# Patient Record
Sex: FEMALE | Race: WHITE | URBAN - METROPOLITAN AREA
[De-identification: names, ages, dates, MRNs, and addresses within clinical notes are randomized per-mention and may not be internally consistent; named-entity substitution may affect disease eponyms.]

---

## 2018-03-01 ENCOUNTER — AMBULATORY CONVERSION ENCOUNTER (OUTPATIENT)
Dept: FAMILY MEDICINE | Facility: CLINIC | Age: 83
End: 2018-03-01

## 2018-03-14 ENCOUNTER — TELEPHONE (OUTPATIENT)
Dept: FAMILY MEDICINE | Facility: CLINIC | Age: 83
End: 2018-03-14

## 2018-03-15 ENCOUNTER — CLINICAL SUPPORT (OUTPATIENT)
Dept: FAMILY MEDICINE | Facility: CLINIC | Age: 83
End: 2018-03-15
Payer: MEDICARE

## 2018-03-15 VITALS — TEMPERATURE: 97.9 F

## 2018-03-15 DIAGNOSIS — M81.0 AGE-RELATED OSTEOPOROSIS WITHOUT CURRENT PATHOLOGICAL FRACTURE: Primary | ICD-10-CM

## 2018-03-30 RX ORDER — ZOLPIDEM TARTRATE 12.5 MG/1
12.5 TABLET, FILM COATED, EXTENDED RELEASE ORAL NIGHTLY
COMMUNITY
Start: 2018-02-01 | End: 2018-03-30 | Stop reason: SDUPTHER

## 2018-03-30 RX ORDER — ZOLPIDEM TARTRATE 12.5 MG/1
12.5 TABLET, FILM COATED, EXTENDED RELEASE ORAL NIGHTLY
Qty: 30 TABLET | Refills: 0 | Status: SHIPPED | OUTPATIENT
Start: 2018-03-30 | End: 2018-12-03 | Stop reason: DRUGHIGH

## 2018-04-10 RX ORDER — LANCETS 28 GAUGE
EACH MISCELLANEOUS
COMMUNITY
Start: 2016-06-06 | End: 2020-03-04

## 2018-04-10 RX ORDER — CLOPIDOGREL BISULFATE 75 MG/1
75 TABLET ORAL DAILY
COMMUNITY
Start: 2018-01-02 | End: 2018-05-11 | Stop reason: SDUPTHER

## 2018-04-10 RX ORDER — PIOGLITAZONEHYDROCHLORIDE 15 MG/1
15 TABLET ORAL DAILY
COMMUNITY
Start: 2018-01-02 | End: 2018-06-01 | Stop reason: SDUPTHER

## 2018-04-10 RX ORDER — ASPIRIN 325 MG
325 TABLET ORAL
COMMUNITY
Start: 2015-04-06 | End: 2019-03-25

## 2018-04-10 RX ORDER — SIMVASTATIN 40 MG/1
40 TABLET, FILM COATED ORAL DAILY
COMMUNITY
Start: 2018-01-02 | End: 2018-06-01 | Stop reason: SDUPTHER

## 2018-04-10 RX ORDER — GLYBURIDE-METFORMIN HYDROCHLORIDE 5; 500 MG/1; MG/1
1 TABLET ORAL DAILY
COMMUNITY
Start: 2018-01-02 | End: 2018-06-01 | Stop reason: SDUPTHER

## 2018-04-10 RX ORDER — PANTOPRAZOLE SODIUM 40 MG/1
40 TABLET, DELAYED RELEASE ORAL DAILY
COMMUNITY
Start: 2018-01-02 | End: 2018-06-01 | Stop reason: SDUPTHER

## 2018-04-10 RX ORDER — AMLODIPINE BESYLATE 10 MG/1
10 TABLET ORAL DAILY
COMMUNITY
Start: 2018-01-02 | End: 2018-06-01 | Stop reason: SDUPTHER

## 2018-04-12 ENCOUNTER — OFFICE VISIT (OUTPATIENT)
Dept: FAMILY MEDICINE | Facility: CLINIC | Age: 83
End: 2018-04-12
Attending: FAMILY MEDICINE
Payer: MEDICARE

## 2018-04-12 VITALS
WEIGHT: 183 LBS | HEIGHT: 62 IN | BODY MASS INDEX: 33.68 KG/M2 | TEMPERATURE: 98.1 F | SYSTOLIC BLOOD PRESSURE: 122 MMHG | OXYGEN SATURATION: 96 % | DIASTOLIC BLOOD PRESSURE: 60 MMHG | RESPIRATION RATE: 16 BRPM | HEART RATE: 80 BPM

## 2018-04-12 DIAGNOSIS — K21.9 GASTROESOPHAGEAL REFLUX DISEASE WITHOUT ESOPHAGITIS: ICD-10-CM

## 2018-04-12 DIAGNOSIS — E11.9 TYPE 2 DIABETES MELLITUS WITHOUT COMPLICATION, WITHOUT LONG-TERM CURRENT USE OF INSULIN (CMS/HCC): Primary | ICD-10-CM

## 2018-04-12 DIAGNOSIS — G47.9 SLEEP DISORDER: ICD-10-CM

## 2018-04-12 DIAGNOSIS — I10 ESSENTIAL HYPERTENSION: ICD-10-CM

## 2018-04-12 DIAGNOSIS — I73.9 PVD (PERIPHERAL VASCULAR DISEASE) (CMS/HCC): ICD-10-CM

## 2018-04-12 DIAGNOSIS — E78.2 MIXED HYPERLIPIDEMIA: ICD-10-CM

## 2018-04-12 DIAGNOSIS — G47.9 DIFFICULTY SLEEPING: ICD-10-CM

## 2018-04-12 PROCEDURE — 99214 OFFICE O/P EST MOD 30 MIN: CPT | Performed by: FAMILY MEDICINE

## 2018-04-12 NOTE — PROGRESS NOTES
Subjective      Patient ID: Thelma Gomes is a 82 y.o. female.    HPI    Medical management of diabetes, hypertension, hyperlipidemia  Pre visit care team ting performed  A report card has been prepared for today's visit  Following diet -=    Seeing vascular - slowly healing wound      The following have been reviewed and updated as appropriate in this visit:  Problems       Review of Systems   Constitutional: Negative for chills, fatigue, fever and unexpected weight change.   HENT: Negative for ear pain, hearing loss, sore throat, tinnitus and voice change.    Eyes: Negative for visual disturbance.   Respiratory: Negative for cough, chest tightness, shortness of breath, wheezing and stridor.    Cardiovascular: Negative for chest pain, palpitations and leg swelling.   Gastrointestinal: Negative for abdominal pain, blood in stool, constipation, diarrhea and nausea.   Endocrine: Negative for cold intolerance and heat intolerance.   Genitourinary: Negative for dysuria, frequency, pelvic pain, vaginal bleeding and vaginal discharge.   Musculoskeletal: Negative for arthralgias, back pain and gait problem.   Skin:        Persistent wound left tibia   Neurological: Negative for dizziness, tremors, seizures, syncope, weakness, light-headedness and headaches.   Hematological: Does not bruise/bleed easily.   Psychiatric/Behavioral: Positive for sleep disturbance.       Current Outpatient Prescriptions   Medication Sig Dispense Refill   • amLODIPine (NORVASC) 10 mg tablet Take 10 mg by mouth daily.     • aspirin 325 mg tablet 325 mg.     • blood sugar diagnostic (TRUETEST TEST STRIPS) strip use for daily glucose trsting     • cholecalciferol, vitamin D3, (VITAMIN D3) 4,000 unit capsule take one capsule by oral route  every day with food     • clopidogrel (PLAVIX) 75 mg tablet Take 75 mg by mouth daily.     • glyburide-metformin (GLUCOVANCE) 5-500 mg per tablet Take 1 tablet by mouth daily.     • lancets (freestyle) 28 gauge  misc test once a day     • pantoprazole (PROTONIX) 40 mg EC tablet Take 40 mg by mouth daily.     • pioglitazone (ACTOS) 15 mg tablet Take 15 mg by mouth daily.     • simvastatin (ZOCOR) 40 mg tablet Take 40 mg by mouth daily.     • zolpidem CR (AMBIEN CR) 12.5 mg CR tablet Take 1 tablet (12.5 mg total) by mouth nightly. 30 tablet 0     No current facility-administered medications for this visit.      History reviewed. No pertinent past medical history.  Family History   Problem Relation Age of Onset   • Diabetes Mother    • Breast cancer Sister    • Hypertension Sister    • Stroke Sister    • Lung cancer Brother      Past Surgical History:   Procedure Laterality Date   •  SECTION     • CORONARY STENT PLACEMENT       Allergies   Allergen Reactions   • Cephalexin Monohydrate    • Cortisone Acetate    • Morphine      Other reaction(s): Rash     Social History     Social History   • Marital status:      Spouse name: N/A   • Number of children: N/A   • Years of education: N/A     Occupational History   • Not on file.     Social History Main Topics   • Smoking status: Former Smoker   • Smokeless tobacco: Never Used   • Alcohol use Not on file   • Drug use: Unknown   • Sexual activity: Not on file     Other Topics Concern   • Not on file     Social History Narrative   • No narrative on file         Objective     Physical Exam   Constitutional: She appears well-developed and well-nourished.   HENT:   Head: Normocephalic.   Right Ear: Tympanic membrane normal.   Left Ear: Tympanic membrane normal.   Nose: Nose normal.   Mouth/Throat: Oropharynx is clear and moist.   Eyes: EOM and lids are normal. Pupils are equal, round, and reactive to light. Lids are everted and swept, no foreign bodies found.   Neck: Carotid bruit is present. No thyromegaly present.   Cardiovascular: Normal rate and regular rhythm.    Murmur (2/6 JOHNSON) heard.  Pulses:       Dorsalis pedis pulses are 1+ on the left side.        Posterior  tibial pulses are 1+ on the left side.   Pulmonary/Chest: Breath sounds normal.   Abdominal: Soft. Normal appearance, normal aorta and bowel sounds are normal. She exhibits no abdominal bruit. There is no tenderness. Hernia confirmed negative in the right inguinal area and confirmed negative in the left inguinal area.   Genitourinary: Vagina normal. Right adnexum displays no mass. Left adnexum displays no mass.   Lymphadenopathy:     She has no cervical adenopathy.     She has no axillary adenopathy.   Neurological: She is alert.   Skin: Skin is warm. No rash noted.   Open wound left ant tibia    Psychiatric: She has a normal mood and affect.       Assessment/Plan   Problem List Items Addressed This Visit     Difficulty sleeping     same meds - long term safety meds discussed         Type 2 diabetes mellitus without complication, without long-term current use of insulin (CMS/[facility]) ([facility]) - Primary     Good diabetic control.  Current medications reviewed.  Recommend diet modifications and weight loss.   Met with patient to review diet, exercise, and glucometer readings  Reviewed medications and the importance of compliance  Diabetes health maintenance plan and follow up was discussed and understood by patient         Relevant Medications    glyburide-metformin (GLUCOVANCE) 5-500 mg per tablet    pioglitazone (ACTOS) 15 mg tablet    Other Relevant Orders    Basic metabolic panel    Hemoglobin A1c    Essential hypertension     Hypertension well controlled  Recommend regular exercise and low salt diet  Risk and potential complications of hypertension discussed  Importance of medication compliance and regular follow up emphasized  Role of medication/diet/exercise in treating hypertension discussed  Treatment plan and follow up reviewed and understood by patient         Relevant Medications    amLODIPine (NORVASC) 10 mg tablet    GERD (gastroesophageal reflux disease)     Patient was counselled regarding triggers for  symptoms - avoid caffeine/spicey foods/NO smoking  Elevate HOB 30 degrees - DO NOT lie flat for at least 30 minutes after eating  Take medications as directed         Relevant Medications    pantoprazole (PROTONIX) 40 mg EC tablet    Mixed hyperlipidemia     GOOD hyperlipidemia control  CONTINUE CURRENT THERAPY  Recommend low fat diet - Risks and potential complications of hyperlipidemia reviewed  Role of medications,diet, exercise in the treatment of hyperlipidemia discussed   Treatment plan and follow up reviewed and understood by patient         Relevant Medications    simvastatin (ZOCOR) 40 mg tablet    PVD (peripheral vascular disease) (CMS/HCC) (HCC)     Continue f/u with vascular  Continue wound care         Sleep disorder     same meds - long term safety meds discussed               Traci Katz DO  4/14/2018

## 2018-04-14 PROBLEM — G47.9 SLEEP DISORDER: Status: ACTIVE | Noted: 2018-04-14

## 2018-04-14 PROBLEM — I73.9 PVD (PERIPHERAL VASCULAR DISEASE) (CMS/HCC): Status: ACTIVE | Noted: 2018-04-14

## 2018-04-14 ASSESSMENT — ENCOUNTER SYMPTOMS
FREQUENCY: 0
NAUSEA: 0
SHORTNESS OF BREATH: 0
FEVER: 0
TREMORS: 0
HEADACHES: 0
WEAKNESS: 0
DYSURIA: 0
LIGHT-HEADEDNESS: 0
VOICE CHANGE: 0
COUGH: 0
CONSTIPATION: 0
UNEXPECTED WEIGHT CHANGE: 0
DIARRHEA: 0
CHEST TIGHTNESS: 0
ARTHRALGIAS: 0
STRIDOR: 0
CHILLS: 0
SLEEP DISTURBANCE: 1
PALPITATIONS: 0
BRUISES/BLEEDS EASILY: 0
FATIGUE: 0
DIZZINESS: 0
SEIZURES: 0
ABDOMINAL PAIN: 0
BLOOD IN STOOL: 0
WHEEZING: 0
BACK PAIN: 0
SORE THROAT: 0

## 2018-04-14 NOTE — ASSESSMENT & PLAN NOTE
Hypertension well controlled  Recommend regular exercise and low salt diet  Risk and potential complications of hypertension discussed  Importance of medication compliance and regular follow up emphasized  Role of medication/diet/exercise in treating hypertension discussed  Treatment plan and follow up reviewed and understood by patient

## 2018-04-14 NOTE — ASSESSMENT & PLAN NOTE
Good diabetic control.  Current medications reviewed.  Recommend diet modifications and weight loss.   Met with patient to review diet, exercise, and glucometer readings  Reviewed medications and the importance of compliance  Diabetes health maintenance plan and follow up was discussed and understood by patient

## 2018-04-14 NOTE — ASSESSMENT & PLAN NOTE
Patient was counselled regarding triggers for symptoms - avoid caffeine/spicey foods/NO smoking  Elevate HOB 30 degrees - DO NOT lie flat for at least 30 minutes after eating  Take medications as directed

## 2018-04-14 NOTE — ASSESSMENT & PLAN NOTE
GOOD hyperlipidemia control  CONTINUE CURRENT THERAPY  Recommend low fat diet - Risks and potential complications of hyperlipidemia reviewed  Role of medications,diet, exercise in the treatment of hyperlipidemia discussed   Treatment plan and follow up reviewed and understood by patient

## 2018-05-12 RX ORDER — CLOPIDOGREL BISULFATE 75 MG/1
TABLET ORAL
Qty: 90 TABLET | Refills: 1 | Status: SHIPPED | OUTPATIENT
Start: 2018-05-12 | End: 2018-07-02 | Stop reason: SDUPTHER

## 2018-05-12 RX ORDER — ZOLPIDEM TARTRATE 12.5 MG/1
TABLET, FILM COATED, EXTENDED RELEASE ORAL
Qty: 30 TABLET | Refills: 0 | Status: SHIPPED | OUTPATIENT
Start: 2018-05-12 | End: 2018-07-02 | Stop reason: SDUPTHER

## 2018-06-01 RX ORDER — PIOGLITAZONEHYDROCHLORIDE 15 MG/1
15 TABLET ORAL DAILY
Qty: 30 TABLET | Refills: 3 | Status: SHIPPED | OUTPATIENT
Start: 2018-06-01 | End: 2018-06-09 | Stop reason: SDUPTHER

## 2018-06-01 RX ORDER — SIMVASTATIN 40 MG/1
40 TABLET, FILM COATED ORAL NIGHTLY
Qty: 30 TABLET | Refills: 3 | Status: SHIPPED | OUTPATIENT
Start: 2018-06-01 | End: 2018-07-31 | Stop reason: SDUPTHER

## 2018-06-01 RX ORDER — AMLODIPINE BESYLATE 10 MG/1
10 TABLET ORAL DAILY
Qty: 30 TABLET | Refills: 3 | Status: SHIPPED | OUTPATIENT
Start: 2018-06-01 | End: 2018-10-11 | Stop reason: SDUPTHER

## 2018-06-01 RX ORDER — PANTOPRAZOLE SODIUM 40 MG/1
40 TABLET, DELAYED RELEASE ORAL DAILY
Qty: 30 TABLET | Refills: 3 | Status: SHIPPED | OUTPATIENT
Start: 2018-06-01 | End: 2018-10-11 | Stop reason: SDUPTHER

## 2018-06-01 RX ORDER — GLYBURIDE-METFORMIN HYDROCHLORIDE 5; 500 MG/1; MG/1
1 TABLET ORAL
Qty: 30 TABLET | Refills: 3 | Status: SHIPPED | OUTPATIENT
Start: 2018-06-01 | End: 2018-08-20 | Stop reason: SDUPTHER

## 2018-06-09 RX ORDER — PIOGLITAZONEHYDROCHLORIDE 15 MG/1
TABLET ORAL
Qty: 90 TABLET | Refills: 0 | Status: SHIPPED | OUTPATIENT
Start: 2018-06-09 | End: 2018-07-31 | Stop reason: SDUPTHER

## 2018-07-02 RX ORDER — ZOLPIDEM TARTRATE 12.5 MG/1
12.5 TABLET, FILM COATED, EXTENDED RELEASE ORAL NIGHTLY
Qty: 30 TABLET | Refills: 0 | Status: SHIPPED | OUTPATIENT
Start: 2018-07-02 | End: 2018-07-31 | Stop reason: SDUPTHER

## 2018-07-02 RX ORDER — CLOPIDOGREL BISULFATE 75 MG/1
75 TABLET ORAL
Qty: 30 TABLET | Refills: 2 | Status: SHIPPED | OUTPATIENT
Start: 2018-07-02 | End: 2018-10-11 | Stop reason: SDUPTHER

## 2018-07-31 RX ORDER — PIOGLITAZONEHYDROCHLORIDE 15 MG/1
15 TABLET ORAL
Qty: 90 TABLET | Refills: 0 | Status: SHIPPED | OUTPATIENT
Start: 2018-07-31 | End: 2018-08-20 | Stop reason: SDUPTHER

## 2018-07-31 RX ORDER — ZOLPIDEM TARTRATE 12.5 MG/1
12.5 TABLET, FILM COATED, EXTENDED RELEASE ORAL NIGHTLY
Qty: 30 TABLET | Refills: 0 | Status: SHIPPED | OUTPATIENT
Start: 2018-07-31 | End: 2018-08-31 | Stop reason: SDUPTHER

## 2018-07-31 RX ORDER — SIMVASTATIN 40 MG/1
40 TABLET, FILM COATED ORAL NIGHTLY
Qty: 30 TABLET | Refills: 3 | Status: SHIPPED | OUTPATIENT
Start: 2018-07-31 | End: 2018-11-07 | Stop reason: SDUPTHER

## 2018-07-31 NOTE — TELEPHONE ENCOUNTER
[employer] website queried no abnormalities noted.  Patient is only receiving controlled substances from my office  Prescription sent electronically    Last fill zolpidem per website 6/9/18

## 2018-08-19 RX ORDER — ZOLPIDEM TARTRATE 12.5 MG/1
TABLET, FILM COATED, EXTENDED RELEASE ORAL
Qty: 30 TABLET | OUTPATIENT
Start: 2018-08-19

## 2018-08-19 NOTE — TELEPHONE ENCOUNTER
[employer] website queried no abnormalities noted.  Patient is only receiving controlled substances from my office    Last refill 7/23 - cannot refil until 8/21

## 2018-08-20 RX ORDER — PIOGLITAZONEHYDROCHLORIDE 15 MG/1
15 TABLET ORAL
Qty: 90 TABLET | Refills: 1 | Status: SHIPPED | OUTPATIENT
Start: 2018-08-20 | End: 2019-05-22 | Stop reason: SDUPTHER

## 2018-08-20 RX ORDER — GLYBURIDE-METFORMIN HYDROCHLORIDE 5; 500 MG/1; MG/1
1 TABLET ORAL
Qty: 90 TABLET | Refills: 1 | Status: SHIPPED | OUTPATIENT
Start: 2018-08-20 | End: 2018-10-01 | Stop reason: SDUPTHER

## 2018-08-24 LAB
BUN SERPL-MCNC: 17 MG/DL (ref 7–25)
BUN/CREAT SERPL: NORMAL (CALC) (ref 6–22)
CALCIUM SERPL-MCNC: 9.2 MG/DL (ref 8.6–10.4)
CHLORIDE SERPL-SCNC: 105 MMOL/L (ref 98–110)
CO2 SERPL-SCNC: 28 MMOL/L (ref 20–32)
CREAT SERPL-MCNC: 0.79 MG/DL (ref 0.6–0.88)
GFR SERPL CREATININE-BSD FRML MDRD: 70 ML/MIN/1.73M2
GLUCOSE SERPL-MCNC: 88 MG/DL (ref 65–99)
HBA1C MFR BLD: 5.5 % OF TOTAL HGB
POTASSIUM SERPL-SCNC: 4.4 MMOL/L (ref 3.5–5.3)
SODIUM SERPL-SCNC: 141 MMOL/L (ref 135–146)

## 2018-08-31 RX ORDER — ZOLPIDEM TARTRATE 12.5 MG/1
12.5 TABLET, FILM COATED, EXTENDED RELEASE ORAL NIGHTLY
Qty: 30 TABLET | Refills: 0 | Status: SHIPPED | OUTPATIENT
Start: 2018-08-31 | End: 2018-10-11 | Stop reason: SDUPTHER

## 2018-09-05 RX ORDER — AMLODIPINE BESYLATE 10 MG/1
TABLET ORAL
Qty: 30 TABLET | Refills: 2 | OUTPATIENT
Start: 2018-09-05

## 2018-09-05 RX ORDER — CLOPIDOGREL BISULFATE 75 MG/1
TABLET ORAL
Qty: 30 TABLET | OUTPATIENT
Start: 2018-09-05

## 2018-09-05 RX ORDER — PANTOPRAZOLE SODIUM 40 MG/1
TABLET, DELAYED RELEASE ORAL
Qty: 30 TABLET | OUTPATIENT
Start: 2018-09-05

## 2018-09-24 ENCOUNTER — OFFICE VISIT (OUTPATIENT)
Dept: FAMILY MEDICINE | Facility: CLINIC | Age: 83
End: 2018-09-24
Payer: MEDICARE

## 2018-09-24 VITALS
SYSTOLIC BLOOD PRESSURE: 124 MMHG | HEART RATE: 74 BPM | OXYGEN SATURATION: 97 % | DIASTOLIC BLOOD PRESSURE: 70 MMHG | RESPIRATION RATE: 16 BRPM | WEIGHT: 183 LBS | HEIGHT: 62 IN | BODY MASS INDEX: 33.68 KG/M2 | TEMPERATURE: 98.1 F

## 2018-09-24 DIAGNOSIS — E11.9 TYPE 2 DIABETES MELLITUS WITHOUT COMPLICATION, WITHOUT LONG-TERM CURRENT USE OF INSULIN (CMS/HCC): Primary | ICD-10-CM

## 2018-09-24 DIAGNOSIS — I73.9 PVD (PERIPHERAL VASCULAR DISEASE) (CMS/HCC): ICD-10-CM

## 2018-09-24 DIAGNOSIS — G47.9 SLEEP DISORDER: ICD-10-CM

## 2018-09-24 DIAGNOSIS — E78.2 MIXED HYPERLIPIDEMIA: ICD-10-CM

## 2018-09-24 DIAGNOSIS — I10 ESSENTIAL HYPERTENSION: ICD-10-CM

## 2018-09-24 DIAGNOSIS — K21.9 GASTROESOPHAGEAL REFLUX DISEASE WITHOUT ESOPHAGITIS: ICD-10-CM

## 2018-09-24 DIAGNOSIS — M81.0 AGE-RELATED OSTEOPOROSIS WITHOUT CURRENT PATHOLOGICAL FRACTURE: ICD-10-CM

## 2018-09-24 PROCEDURE — 96372 THER/PROPH/DIAG INJ SC/IM: CPT | Performed by: FAMILY MEDICINE

## 2018-09-24 PROCEDURE — 99214 OFFICE O/P EST MOD 30 MIN: CPT | Mod: 25 | Performed by: FAMILY MEDICINE

## 2018-09-24 ASSESSMENT — ENCOUNTER SYMPTOMS
HEADACHES: 0
NUMBNESS: 0
ABDOMINAL PAIN: 0
COLOR CHANGE: 0
ADENOPATHY: 0
PALPITATIONS: 0
BRUISES/BLEEDS EASILY: 0
DIARRHEA: 0
FATIGUE: 0
ARTHRALGIAS: 1
BACK PAIN: 0
DIZZINESS: 0
BLOOD IN STOOL: 0
FREQUENCY: 0
CONSTIPATION: 0
HEMATURIA: 0
CHILLS: 0
DYSURIA: 0
NAUSEA: 0
SHORTNESS OF BREATH: 0
COUGH: 0
SLEEP DISTURBANCE: 0
UNEXPECTED WEIGHT CHANGE: 0

## 2018-09-24 NOTE — PROGRESS NOTES
Subjective      Patient ID: Thelma Gomes is a 82 y.o. female.    HPI    Medical management of diabetes, hypertension, hyperlipidemia  Pre visit care team ting performed  A report card has been prepared for today's visit  Following diet - less exercise since treadmill broken       Walking in home     The following have been reviewed and updated as appropriate in this visit:  Problems       Review of Systems   Constitutional: Negative for chills, fatigue and unexpected weight change.   Eyes: Negative for visual disturbance (eye xam current - ).   Respiratory: Negative for cough and shortness of breath.    Cardiovascular: Negative for chest pain, palpitations and leg swelling.   Gastrointestinal: Negative for abdominal pain, blood in stool, constipation, diarrhea and nausea.   Endocrine: Negative for cold intolerance and heat intolerance.   Genitourinary: Negative for dysuria, frequency, hematuria and urgency.   Musculoskeletal: Positive for arthralgias (check left knee -swelling/ no pain). Negative for back pain.   Skin: Negative for color change.   Neurological: Negative for dizziness, numbness and headaches.   Hematological: Negative for adenopathy. Does not bruise/bleed easily.   Psychiatric/Behavioral: Negative for sleep disturbance.       Current Outpatient Prescriptions   Medication Sig Dispense Refill   • amLODIPine (NORVASC) 10 mg tablet Take 1 tablet (10 mg total) by mouth daily. 30 tablet 3   • aspirin 325 mg tablet 325 mg.     • blood sugar diagnostic (TRUETEST TEST STRIPS) strip use for daily glucose trsting     • cholecalciferol, vitamin D3, (VITAMIN D3) 4,000 unit capsule take one capsule by oral route  every day with food     • clopidogrel (PLAVIX) 75 mg tablet Take 1 tablet (75 mg total) by mouth once daily. 30 tablet 2   • glyburide-metformin (GLUCOVANCE) 5-500 mg per tablet Take 1 tablet by mouth daily with dinner. 90 tablet 1   • lancets (freestyle) 28 gauge misc test once a day     •  "pantoprazole (PROTONIX) 40 mg EC tablet Take 1 tablet (40 mg total) by mouth daily. 30 tablet 3   • pioglitazone (ACTOS) 15 mg tablet Take 1 tablet (15 mg total) by mouth once daily. 90 tablet 1   • simvastatin (ZOCOR) 40 mg tablet Take 1 tablet (40 mg total) by mouth nightly. 30 tablet 3   • zolpidem CR (AMBIEN CR) 12.5 mg CR tablet Take 1 tablet (12.5 mg total) by mouth nightly. 30 tablet 0     No current facility-administered medications for this visit.      History reviewed. No pertinent past medical history.  Family History   Problem Relation Age of Onset   • Diabetes Mother    • Breast cancer Sister    • Hypertension Sister    • Stroke Sister    • Lung cancer Brother      Past Surgical History:   Procedure Laterality Date   •  SECTION     • CORONARY STENT PLACEMENT       Allergies   Allergen Reactions   • Cephalexin Monohydrate    • Cortisone Acetate    • Morphine      Other reaction(s): Rash     Social History     Social History   • Marital status:      Spouse name: N/A   • Number of children: N/A   • Years of education: N/A     Occupational History   • Not on file.     Social History Main Topics   • Smoking status: Former Smoker   • Smokeless tobacco: Never Used   • Alcohol use Not on file   • Drug use: Unknown   • Sexual activity: Not on file     Other Topics Concern   • Not on file     Social History Narrative   • No narrative on file     Vitals:    18 0818   BP: 124/70   Pulse: 74   Resp: 16   Temp: 36.7 °C (98.1 °F)   SpO2: 97%   Weight: 83 kg (183 lb)   Height: 1.562 m (5' 1.5\")       Objective     Physical Exam   Constitutional: She is oriented to person, place, and time. She appears well-developed and well-nourished.   HENT:   Head: Normocephalic.   Eyes: EOM are normal. Pupils are equal, round, and reactive to light.   Fundoscopic exam:       The right eye shows no AV nicking.        The left eye shows no AV nicking.   Neck: Carotid bruit is present. No thyromegaly present. "   Cardiovascular: Normal rate, regular rhythm and intact distal pulses.  Exam reveals decreased pulses.    Murmur heard.   Systolic murmur is present with a grade of 3/6   Pulses:       Dorsalis pedis pulses are 1+ on the right side, and 1+ on the left side.        Posterior tibial pulses are 1+ on the right side, and 1+ on the left side.   Pulmonary/Chest: Effort normal and breath sounds normal.   Abdominal: Soft. Normal appearance and bowel sounds are normal. There is no hepatosplenomegaly. There is no tenderness. There is no CVA tenderness.   Neurological: She is alert and oriented to person, place, and time. No sensory deficit.   Skin: Skin is warm, dry and intact. No rash noted.   Psychiatric: She has a normal mood and affect. Her speech is normal. Cognition and memory are normal.       Assessment/Plan   Problem List Items Addressed This Visit     Type 2 diabetes mellitus without complication, without long-term current use of insulin (CMS/HCC) ([facility]) - Primary     Good diabetic control.  Current medications reviewed.  Recommend diet modifications and weight loss.   Met with patient to review diet, exercise, and glucometer readings  Reviewed medications and the importance of compliance  Diabetes health maintenance plan and follow up was discussed and understood by patient         Relevant Orders    Microalbumin / creatinine urine ratio    Essential hypertension     Hypertension well controlled  Recommend regular exercise and low salt diet  Risk and potential complications of hypertension discussed  Importance of medication compliance and regular follow up emphasized  Role of medication/diet/exercise in treating hypertension discussed  Treatment plan and follow up reviewed and understood by patient         Gastroesophageal reflux disease without esophagitis     Patient was counselled regarding triggers for symptoms - avoid caffeine/spicey foods/NO smoking  Elevate HOB 30 degrees - DO NOT lie flat for at least 30  minutes after eating  Take medications as directed         Mixed hyperlipidemia     GOOD hyperlipidemia control  CONTINUE CURRENT THERAPY  Recommend low fat diet - Risks and potential complications of hyperlipidemia reviewed  Role of medications,diet, exercise in the treatment of hyperlipidemia discussed   Treatment plan and follow up reviewed and understood by patient         PVD (peripheral vascular disease) (CMS/HCC) ([facility])     Continue f/u with vascular  Continue wound care         Sleep disorder     same meds - long term safety meds discussed         Age-related osteoporosis without current pathological fracture     Continue prolia RX         Relevant Medications    denosumab (PROLIA) syringe 60 mg (Completed)          Traci Katz,   9/24/2018

## 2018-09-25 LAB
ALBUMIN/CREAT UR: 66 MCG/MG CREAT
CREAT UR-MCNC: 127 MG/DL (ref 20–275)
MICROALBUMIN UR-MCNC: 8.4 MG/DL

## 2018-10-02 RX ORDER — GLYBURIDE-METFORMIN HYDROCHLORIDE 5; 500 MG/1; MG/1
TABLET ORAL
Qty: 90 TABLET | Refills: 1 | Status: SHIPPED | OUTPATIENT
Start: 2018-10-02 | End: 2019-02-21

## 2018-10-12 RX ORDER — CLOPIDOGREL BISULFATE 75 MG/1
TABLET ORAL
Qty: 30 TABLET | Refills: 2 | Status: SHIPPED | OUTPATIENT
Start: 2018-10-12 | End: 2019-01-05 | Stop reason: SDUPTHER

## 2018-10-12 RX ORDER — AMLODIPINE BESYLATE 10 MG/1
TABLET ORAL
Qty: 30 TABLET | Refills: 2 | Status: SHIPPED | OUTPATIENT
Start: 2018-10-12 | End: 2019-01-05 | Stop reason: SDUPTHER

## 2018-10-12 RX ORDER — GLYBURIDE-METFORMIN HYDROCHLORIDE 5; 500 MG/1; MG/1
TABLET ORAL
Qty: 90 TABLET | OUTPATIENT
Start: 2018-10-12

## 2018-10-12 RX ORDER — ZOLPIDEM TARTRATE 12.5 MG/1
TABLET, FILM COATED, EXTENDED RELEASE ORAL
Qty: 30 TABLET | Refills: 0 | Status: SHIPPED | OUTPATIENT
Start: 2018-10-12 | End: 2018-11-29 | Stop reason: SDUPTHER

## 2018-10-12 RX ORDER — PANTOPRAZOLE SODIUM 40 MG/1
TABLET, DELAYED RELEASE ORAL
Qty: 30 TABLET | Refills: 2 | Status: SHIPPED | OUTPATIENT
Start: 2018-10-12 | End: 2018-12-04 | Stop reason: SDUPTHER

## 2018-10-31 ENCOUNTER — TELEPHONE (OUTPATIENT)
Dept: FAMILY MEDICINE | Facility: CLINIC | Age: 83
End: 2018-10-31

## 2018-10-31 NOTE — TELEPHONE ENCOUNTER
Pt LM requesting refill of amlodipine, clopidogrel, pantoprazole and zolpidem. Meds were sent to pharm on 10/12/18. LM for pharm to fill.

## 2018-11-08 RX ORDER — SIMVASTATIN 40 MG/1
TABLET, FILM COATED ORAL
Qty: 30 TABLET | Refills: 3 | Status: SHIPPED | OUTPATIENT
Start: 2018-11-08 | End: 2019-03-01 | Stop reason: SDUPTHER

## 2018-11-08 RX ORDER — GLYBURIDE-METFORMIN HYDROCHLORIDE 5; 500 MG/1; MG/1
TABLET ORAL
Qty: 90 TABLET | Refills: 0 | Status: SHIPPED | OUTPATIENT
Start: 2018-11-08 | End: 2019-06-28 | Stop reason: SDUPTHER

## 2018-11-08 RX ORDER — ZOLPIDEM TARTRATE 12.5 MG/1
TABLET, FILM COATED, EXTENDED RELEASE ORAL
Qty: 30 TABLET | Refills: 0 | OUTPATIENT
Start: 2018-11-08

## 2018-11-08 NOTE — TELEPHONE ENCOUNTER
[employer] website queried no abnormalities noted.  Patient is only receiving controlled substances from my office    Zolpidem last fille 10/12 - cannot fill until 11/10

## 2018-11-29 RX ORDER — ZOLPIDEM TARTRATE 12.5 MG/1
TABLET, FILM COATED, EXTENDED RELEASE ORAL
Qty: 30 TABLET | Refills: 0 | Status: SHIPPED | OUTPATIENT
Start: 2018-11-29 | End: 2018-12-03 | Stop reason: DRUGHIGH

## 2018-12-03 ENCOUNTER — TELEPHONE (OUTPATIENT)
Dept: FAMILY MEDICINE | Facility: CLINIC | Age: 83
End: 2018-12-03

## 2018-12-03 RX ORDER — ZOLPIDEM TARTRATE 10 MG/1
10 TABLET ORAL NIGHTLY PRN
COMMUNITY
End: 2018-12-03 | Stop reason: DRUGHIGH

## 2018-12-03 NOTE — TELEPHONE ENCOUNTER
Patient's niece (Mary - not on HIPAA) LVM regarding patient's Ambien. States insurance pays for only 15/month. Asking if 12.5mg pills can be split and supplemented with Melatonin or Tyleonol PM or does patient need new script. Call patient back directly or her daughter Isis Graham to discuss

## 2018-12-03 NOTE — TELEPHONE ENCOUNTER
Call pt - the CR ambien cannot be split     We can change to 10mg tab which she can split and ADD melatonin 3mg with 1/2 ambien  Let pt know her niece call ed BUT IF she wants us to speak to her needs to put on HIPPA  Will need to wait to change to 10mg - RX filled 11/29 for 12.5

## 2018-12-03 NOTE — TELEPHONE ENCOUNTER
Pt notified that ambien CR cannot be split. She is agreeable to change Rx to 10mg , 1/2 tab with melatonin 3mg HS. Med list updated to reflect change. Pt aware we cannot talk to her niece since she is not on HIPAA.

## 2018-12-17 ENCOUNTER — TELEPHONE (OUTPATIENT)
Dept: FAMILY MEDICINE | Facility: CLINIC | Age: 83
End: 2018-12-17

## 2018-12-17 NOTE — TELEPHONE ENCOUNTER
Pt cancelled maw for 1/2/19.  She states family member can not bring her due to family illness.  Pt requests to reschedule in march so she can get prolia the same time.  Rescheduled for 3/25/19

## 2018-12-22 RX ORDER — ZOLPIDEM TARTRATE 12.5 MG/1
TABLET, FILM COATED, EXTENDED RELEASE ORAL
Qty: 30 TABLET | OUTPATIENT
Start: 2018-12-22

## 2018-12-22 NOTE — TELEPHONE ENCOUNTER
Call pt - see previous tasks re changing ambiern CR to ambien 10mg - need to check with pharmacy - website lists 2 refils on 12/3 -  ? # 30 filled

## 2019-01-02 RX ORDER — ZOLPIDEM TARTRATE 10 MG/1
10 TABLET ORAL NIGHTLY PRN
Qty: 30 TABLET | Refills: 0 | Status: SHIPPED | OUTPATIENT
Start: 2019-01-02 | End: 2019-02-04 | Stop reason: SDUPTHER

## 2019-01-13 RX ORDER — ZOLPIDEM TARTRATE 10 MG/1
TABLET ORAL
Qty: 30 TABLET | OUTPATIENT
Start: 2019-01-13

## 2019-01-13 NOTE — TELEPHONE ENCOUNTER
[employer] website queried no abnormalities noted.  Patient is only receiving controlled substances from my office    website reveals # 30 filled 1/2/19    RX denied

## 2019-02-04 RX ORDER — ZOLPIDEM TARTRATE 10 MG/1
10 TABLET ORAL NIGHTLY PRN
Qty: 30 TABLET | Refills: 0 | Status: SHIPPED | OUTPATIENT
Start: 2019-02-04 | End: 2019-03-01 | Stop reason: SDUPTHER

## 2019-02-21 ENCOUNTER — OFFICE VISIT (OUTPATIENT)
Dept: FAMILY MEDICINE | Facility: CLINIC | Age: 84
End: 2019-02-21
Payer: MEDICARE

## 2019-02-21 VITALS
SYSTOLIC BLOOD PRESSURE: 128 MMHG | WEIGHT: 189 LBS | TEMPERATURE: 97.8 F | DIASTOLIC BLOOD PRESSURE: 70 MMHG | BODY MASS INDEX: 35.13 KG/M2 | OXYGEN SATURATION: 97 % | RESPIRATION RATE: 16 BRPM | HEART RATE: 84 BPM

## 2019-02-21 DIAGNOSIS — G47.9 SLEEP DISORDER: ICD-10-CM

## 2019-02-21 DIAGNOSIS — M54.2 CERVICALGIA: Primary | ICD-10-CM

## 2019-02-21 PROCEDURE — 99213 OFFICE O/P EST LOW 20 MIN: CPT | Performed by: FAMILY MEDICINE

## 2019-02-21 RX ORDER — CYCLOBENZAPRINE HCL 5 MG
5 TABLET ORAL 2 TIMES DAILY PRN
Qty: 20 TABLET | Refills: 0 | Status: SHIPPED | OUTPATIENT
Start: 2019-02-21 | End: 2019-03-25

## 2019-02-21 NOTE — PROGRESS NOTES
Subjective      Patient ID: Thelma Gomes is a 83 y.o. female.    HPI    Right sided throbbing neck - shoulder to base skull x 3 days  No known injury - no lifting       7 of 10 pain with rotation to right       No numbness/weakness arm    Better with heat    The following have been reviewed and updated as appropriate in this visit:  Allergies  Meds  Problems       Review of Systems   Constitutional: Negative for chills, fatigue and fever.   Musculoskeletal: Positive for neck pain and neck stiffness. Negative for gait problem.   Skin: Negative for rash.   Neurological: Negative for weakness, numbness and headaches.       Current Outpatient Prescriptions   Medication Sig Dispense Refill   • amLODIPine (NORVASC) 10 mg tablet TAKE ONE TABLET BY MOUTH EVERY DAY 90 tablet 0   • aspirin 325 mg tablet 325 mg.     • blood sugar diagnostic (TRUETEST TEST STRIPS) strip use for daily glucose trsting     • cholecalciferol, vitamin D3, (VITAMIN D3) 4,000 unit capsule take one capsule by oral route  every day with food     • clopidogrel (PLAVIX) 75 mg tablet TAKE ONE TABLET BY MOUTH EVERY DAY 90 tablet 0   • glyburide-metformin (GLUCOVANCE) 5-500 mg per tablet TAKE ONE TABLET BY MOUTH DAILY WITH DINNER 90 tablet 0   • lancets (freestyle) 28 gauge misc test once a day     • pantoprazole (PROTONIX) 40 mg EC tablet TAKE ONE TABLET BY MOUTH EVERY DAY 30 tablet 3   • pioglitazone (ACTOS) 15 mg tablet Take 1 tablet (15 mg total) by mouth once daily. 90 tablet 1   • simvastatin (ZOCOR) 40 mg tablet TAKE ONE TABLET BY MOUTH EVERY EVENING 30 tablet 3   • zolpidem (AMBIEN) 10 mg tablet Take 1 tablet (10 mg total) by mouth nightly as needed for sleep. 30 tablet 0   • cyclobenzaprine (FLEXERIL) 5 mg tablet Take 1 tablet (5 mg total) by mouth 2 (two) times a day as needed for muscle spasms for up to 14 days. 20 tablet 0   • pioglitazone (ACTOS) 15 mg tablet TAKE ONE TABLET BY MOUTH EVERY DAY 90 tablet 0     No current facility-administered  medications for this visit.      History reviewed. No pertinent past medical history.  Family History   Problem Relation Age of Onset   • Diabetes Mother    • Breast cancer Sister    • Hypertension Sister    • Stroke Sister    • Lung cancer Brother      Past Surgical History:   Procedure Laterality Date   •  SECTION     • CORONARY STENT PLACEMENT       Allergies   Allergen Reactions   • Cephalexin Monohydrate    • Cortisone Acetate    • Morphine      Other reaction(s): Rash     Social History     Social History   • Marital status:      Spouse name: N/A   • Number of children: N/A   • Years of education: N/A     Occupational History   • Not on file.     Social History Main Topics   • Smoking status: Former Smoker   • Smokeless tobacco: Never Used   • Alcohol use Not on file   • Drug use: Unknown   • Sexual activity: Not on file     Other Topics Concern   • Not on file     Social History Narrative   • No narrative on file     Vitals:    19 1049 19 1104   BP:  128/70   Pulse: 84    Resp: 16    Temp: 36.6 °C (97.8 °F)    SpO2: 97%    Weight: 85.7 kg (189 lb)        Objective     Physical Exam   Constitutional: She appears well-developed and well-nourished. She appears distressed (mild).   Musculoskeletal:   AROM - lateral flexion right : 10 degrees, lateral flexion left :10 degrees  Extension: 20 degrees, Flexion :20 degrees, Rotation left :10 degrees,  Rotation right :10 degrees, Description : active painful range of motion   Restriction : extension mild, lateral bending mild, rotation mild  INSPECTION - GAIT : non antalgic/non broad based. Patient is able to heel-  And -toe walk normally.  Apprehension : negative. Atrophy : absent.   Crepitus : absent.  Deformities : absent  MAXIMUM TENDERNESS : mild midline spinous,paraspinous and trapezial   Tenderness  Posture :symmetrical  SENSATION : normal LEFT arm, RIGHT arm : vibration and light touch  STRENGTH +5/5 bilat       Skin: Skin is warm and  dry. No rash noted.       Assessment/Plan   Problem List Items Addressed This Visit     Sleep disorder     Controlled substance agreement discussed - signed           Other Visit Diagnoses     Cervicalgia    -  Primary          Traci Katz,   2/22/2019

## 2019-02-22 ENCOUNTER — TELEPHONE (OUTPATIENT)
Dept: FAMILY MEDICINE | Facility: CLINIC | Age: 84
End: 2019-02-22

## 2019-02-22 DIAGNOSIS — E78.2 MIXED HYPERLIPIDEMIA: ICD-10-CM

## 2019-02-22 DIAGNOSIS — M81.0 AGE-RELATED OSTEOPOROSIS WITHOUT CURRENT PATHOLOGICAL FRACTURE: Primary | ICD-10-CM

## 2019-02-22 DIAGNOSIS — I10 ESSENTIAL HYPERTENSION: ICD-10-CM

## 2019-02-22 DIAGNOSIS — E11.9 TYPE 2 DIABETES MELLITUS WITHOUT COMPLICATION, WITHOUT LONG-TERM CURRENT USE OF INSULIN (CMS/HCC): ICD-10-CM

## 2019-02-22 DIAGNOSIS — E55.9 VITAMIN D DEFICIENCY: ICD-10-CM

## 2019-02-22 ASSESSMENT — ENCOUNTER SYMPTOMS
HEADACHES: 0
CHILLS: 0
FEVER: 0
NECK PAIN: 1
NUMBNESS: 0
FATIGUE: 0
WEAKNESS: 0
NECK STIFFNESS: 1

## 2019-02-22 NOTE — TELEPHONE ENCOUNTER
Pt needs labs for Prolia last CMP was in 12/2017 also last Lipid were in 12/2017, should she have both at this time? Will mail order to pt

## 2019-03-01 RX ORDER — SIMVASTATIN 40 MG/1
40 TABLET, FILM COATED ORAL EVERY EVENING
Qty: 90 TABLET | Refills: 0 | Status: SHIPPED | OUTPATIENT
Start: 2019-03-01 | End: 2019-05-10 | Stop reason: SDUPTHER

## 2019-03-01 RX ORDER — ZOLPIDEM TARTRATE 10 MG/1
TABLET ORAL
Qty: 30 TABLET | Refills: 0 | Status: SHIPPED | OUTPATIENT
Start: 2019-03-01 | End: 2019-03-28 | Stop reason: SDUPTHER

## 2019-03-01 NOTE — TELEPHONE ENCOUNTER
Last filled: 2/4/2019  Last seen: 2/21/2019  Next appt: 3/25/2019  please advise. Lulú Shaw MA

## 2019-03-08 RX ORDER — ZOLPIDEM TARTRATE 12.5 MG/1
TABLET, FILM COATED, EXTENDED RELEASE ORAL
Qty: 30 TABLET | OUTPATIENT
Start: 2019-03-08

## 2019-03-16 LAB
25(OH)D3 SERPL-MCNC: 70 NG/ML (ref 30–100)
ALBUMIN SERPL-MCNC: 3.9 G/DL (ref 3.6–5.1)
ALBUMIN/GLOB SERPL: 1.4 (CALC) (ref 1–2.5)
ALP SERPL-CCNC: 49 U/L (ref 33–130)
ALT SERPL-CCNC: 7 U/L (ref 6–29)
AST SERPL-CCNC: 18 U/L (ref 10–35)
BASOPHILS # BLD AUTO: 34 CELLS/UL (ref 0–200)
BASOPHILS NFR BLD AUTO: 0.4 %
BILIRUB SERPL-MCNC: 0.5 MG/DL (ref 0.2–1.2)
BUN SERPL-MCNC: 19 MG/DL (ref 7–25)
BUN/CREAT SERPL: NORMAL (CALC) (ref 6–22)
CALCIUM SERPL-MCNC: 9.3 MG/DL (ref 8.6–10.4)
CHLORIDE SERPL-SCNC: 102 MMOL/L (ref 98–110)
CHOLEST SERPL-MCNC: 172 MG/DL
CHOLEST/HDLC SERPL: 2.9 (CALC)
CO2 SERPL-SCNC: 29 MMOL/L (ref 20–32)
CREAT SERPL-MCNC: 0.86 MG/DL (ref 0.6–0.88)
EOSINOPHIL # BLD AUTO: 235 CELLS/UL (ref 15–500)
EOSINOPHIL NFR BLD AUTO: 2.8 %
ERYTHROCYTE [DISTWIDTH] IN BLOOD BY AUTOMATED COUNT: 14.1 % (ref 11–15)
GLOBULIN SER CALC-MCNC: 2.7 G/DL (CALC) (ref 1.9–3.7)
GLUCOSE SERPL-MCNC: 92 MG/DL (ref 65–99)
HBA1C MFR BLD: 5.9 % OF TOTAL HGB
HCT VFR BLD AUTO: 40.6 % (ref 35–45)
HDLC SERPL-MCNC: 59 MG/DL
HGB BLD-MCNC: 13.3 G/DL (ref 11.7–15.5)
LDLC SERPL CALC-MCNC: 93 MG/DL (CALC)
LYMPHOCYTES # BLD AUTO: 2999 CELLS/UL (ref 850–3900)
LYMPHOCYTES NFR BLD AUTO: 35.7 %
MCH RBC QN AUTO: 28.5 PG (ref 27–33)
MCHC RBC AUTO-ENTMCNC: 32.8 G/DL (ref 32–36)
MCV RBC AUTO: 87.1 FL (ref 80–100)
MONOCYTES # BLD AUTO: 706 CELLS/UL (ref 200–950)
MONOCYTES NFR BLD AUTO: 8.4 %
NEUTROPHILS # BLD AUTO: 4427 CELLS/UL (ref 1500–7800)
NEUTROPHILS NFR BLD AUTO: 52.7 %
NONHDLC SERPL-MCNC: 113 MG/DL (CALC)
PLATELET # BLD AUTO: 229 THOUSAND/UL (ref 140–400)
PMV BLD REES-ECKER: 10.9 FL (ref 7.5–12.5)
POTASSIUM SERPL-SCNC: 4 MMOL/L (ref 3.5–5.3)
PROT SERPL-MCNC: 6.6 G/DL (ref 6.1–8.1)
QUEST EGFR NON-AFR. AMERICAN: 63 ML/MIN/1.73M2
RBC # BLD AUTO: 4.66 MILLION/UL (ref 3.8–5.1)
SODIUM SERPL-SCNC: 140 MMOL/L (ref 135–146)
TRIGL SERPL-MCNC: 108 MG/DL
TSH SERPL-ACNC: 2.15 MIU/L (ref 0.4–4.5)
WBC # BLD AUTO: 8.4 THOUSAND/UL (ref 3.8–10.8)

## 2019-03-25 ENCOUNTER — OFFICE VISIT (OUTPATIENT)
Dept: FAMILY MEDICINE | Facility: CLINIC | Age: 84
End: 2019-03-25
Payer: MEDICARE

## 2019-03-25 VITALS
WEIGHT: 190 LBS | TEMPERATURE: 97.8 F | SYSTOLIC BLOOD PRESSURE: 132 MMHG | RESPIRATION RATE: 16 BRPM | HEART RATE: 66 BPM | BODY MASS INDEX: 37.3 KG/M2 | OXYGEN SATURATION: 96 % | HEIGHT: 60 IN | DIASTOLIC BLOOD PRESSURE: 74 MMHG

## 2019-03-25 DIAGNOSIS — Z00.00 MEDICARE ANNUAL WELLNESS VISIT, SUBSEQUENT: Primary | ICD-10-CM

## 2019-03-25 DIAGNOSIS — I10 ESSENTIAL HYPERTENSION: ICD-10-CM

## 2019-03-25 DIAGNOSIS — M81.0 AGE-RELATED OSTEOPOROSIS WITHOUT CURRENT PATHOLOGICAL FRACTURE: ICD-10-CM

## 2019-03-25 DIAGNOSIS — I73.9 PVD (PERIPHERAL VASCULAR DISEASE) (CMS/HCC): ICD-10-CM

## 2019-03-25 DIAGNOSIS — E78.2 MIXED HYPERLIPIDEMIA: ICD-10-CM

## 2019-03-25 DIAGNOSIS — E11.9 TYPE 2 DIABETES MELLITUS WITHOUT COMPLICATION, WITHOUT LONG-TERM CURRENT USE OF INSULIN (CMS/HCC): ICD-10-CM

## 2019-03-25 DIAGNOSIS — I50.22 CHRONIC SYSTOLIC CONGESTIVE HEART FAILURE (CMS/HCC): ICD-10-CM

## 2019-03-25 DIAGNOSIS — E55.9 VITAMIN D DEFICIENCY: ICD-10-CM

## 2019-03-25 PROCEDURE — G0439 PPPS, SUBSEQ VISIT: HCPCS | Performed by: FAMILY MEDICINE

## 2019-03-25 PROCEDURE — 96372 THER/PROPH/DIAG INJ SC/IM: CPT | Performed by: FAMILY MEDICINE

## 2019-03-25 PROCEDURE — 99214 OFFICE O/P EST MOD 30 MIN: CPT | Mod: 25 | Performed by: FAMILY MEDICINE

## 2019-03-25 ASSESSMENT — MINI COG
COMPLETED: YES
TOTAL SCORE: 4

## 2019-03-25 NOTE — PATIENT INSTRUCTIONS
Patient Education     Fall Prevention in the Home  Falls can cause injuries. They can happen to people of all ages. There are many things you can do to make your home safe and to help prevent falls.  What can I do on the outside of my home?  · Regularly fix the edges of walkways and driveways and fix any cracks.  · Remove anything that might make you trip as you walk through a door, such as a raised step or threshold.  · Trim any bushes or trees on the path to your home.  · Use bright outdoor lighting.  · Clear any walking paths of anything that might make someone trip, such as rocks or tools.  · Regularly check to see if handrails are loose or broken. Make sure that both sides of any steps have handrails.  · Any raised decks and porches should have guardrails on the edges.  · Have any leaves, snow, or ice cleared regularly.  · Use sand or salt on walking paths during winter.  · Clean up any spills in your garage right away. This includes oil or grease spills.  What can I do in the bathroom?  · Use night lights.  · Install grab bars by the toilet and in the tub and shower. Do not use towel bars as grab bars.  · Use non-skid mats or decals in the tub or shower.  · If you need to sit down in the shower, use a plastic, non-slip stool.  · Keep the floor dry. Clean up any water that spills on the floor as soon as it happens.  · Remove soap buildup in the tub or shower regularly.  · Attach bath mats securely with double-sided non-slip rug tape.  · Do not have throw rugs and other things on the floor that can make you trip.  What can I do in the bedroom?  · Use night lights.  · Make sure that you have a light by your bed that is easy to reach.  · Do not use any sheets or blankets that are too big for your bed. They should not hang down onto the floor.  · Have a firm chair that has side arms. You can use this for support while you get dressed.  · Do not have throw rugs and other things on the floor that can make you  trip.  What can I do in the kitchen?  · Clean up any spills right away.  · Avoid walking on wet floors.  · Keep items that you use a lot in easy-to-reach places.  · If you need to reach something above you, use a strong step stool that has a grab bar.  · Keep electrical cords out of the way.  · Do not use floor polish or wax that makes floors slippery. If you must use wax, use non-skid floor wax.  · Do not have throw rugs and other things on the floor that can make you trip.  What can I do with my stairs?  · Do not leave any items on the stairs.  · Make sure that there are handrails on both sides of the stairs and use them. Fix handrails that are broken or loose. Make sure that handrails are as long as the stairways.  · Check any carpeting to make sure that it is firmly attached to the stairs. Fix any carpet that is loose or worn.  · Avoid having throw rugs at the top or bottom of the stairs. If you do have throw rugs, attach them to the floor with carpet tape.  · Make sure that you have a light switch at the top of the stairs and the bottom of the stairs. If you do not have them, ask someone to add them for you.  What else can I do to help prevent falls?  · Wear shoes that:  ¨ Do not have high heels.  ¨ Have rubber bottoms.  ¨ Are comfortable and fit you well.  ¨ Are closed at the toe. Do not wear sandals.  · If you use a stepladder:  ¨ Make sure that it is fully opened. Do not climb a closed stepladder.  ¨ Make sure that both sides of the stepladder are locked into place.  ¨ Ask someone to hold it for you, if possible.  · Clearly melvin and make sure that you can see:  ¨ Any grab bars or handrails.  ¨ First and last steps.  ¨ Where the edge of each step is.  · Use tools that help you move around (mobility aids) if they are needed. These include:  ¨ Canes.  ¨ Walkers.  ¨ Scooters.  ¨ Crutches.  · Turn on the lights when you go into a dark area. Replace any light bulbs as soon as they burn out.  · Set up your  furniture so you have a clear path. Avoid moving your furniture around.  · If any of your floors are uneven, fix them.  · If there are any pets around you, be aware of where they are.  · Review your medicines with your doctor. Some medicines can make you feel dizzy. This can increase your chance of falling.  Ask your doctor what other things that you can do to help prevent falls.  This information is not intended to replace advice given to you by your health care provider. Make sure you discuss any questions you have with your health care provider.  Document Released: 10/14/2010 Document Revised: 05/25/2017 Document Reviewed: 01/22/2016  [employer] Patient Education © 2018 [employer]     schedule prolia 6 months                             Women's Personalized Prevention Plan Services (PPPS)      Preventive Services Checklist (Assumes Average Risk Unless Otherwise Noted)  Preventive Service Target Population and Frequency Last Done Date Due   Abd Aortic Aneurysm Screening Family history of AAA (covered once)     Alcohol Misuse Screening As necessary for those at risk (covered annually)     Breast Cancer Screening Mammogram every 1-2yrs 50-71yo; every 1-2yrs 35-48yo if patient desires after weighing potential harms and benefits (covered once 35-40yo, annually >=39yo)     Cholesterol Screening Both risk factors: 1) >=19yo and 2)  increased risk coronary artery disease (covered every 5 years)     Colorectal Cancer Screening >=49yo: Colonoscopy every 10 years, FIT annually or Cologuard every 3 years (up to 84yo for Cologuard)     Diabetes Screening Any 1 risk factor: hypertension, dyslipidemia, obesity, high glucose; or Any 2 risk factors: >=64 yo, overweight, family history diabetes, history gestational diabetes or delivery of infant >9lbs (covered every 6 months)     Glaucoma Screening Any 1 risk factor: 1) diabetes, 2) family history glaucoma, 3)  >=49yo, 4)  American >=66yo  "(covered annually)     Hepatitis C Screening Any 1 risk factor: 1) born between 7403-6892, 2) blood transfusion before 1992, 3) current or past injection drug use (covered once for average risk, annually for high risk)     Lung Cancer Screening Low-dose chest CT if all 3 risk factors: 1) 55-78yo, 2) smoker or quit within last 15y, 3) >=30 pack years (covered annually)     Osteoporosis Screening >=66yo (covered every 24 months)  <66yo if any 1 risk factor: 1) estrogen deficient and at risk for osteoporosis; 2) established osteoporosis on treatment; 3) x-rays show possible osteoporosis, osteopenia or vertebral fractures; 4) on steroid or planning to start; 5) primary hyperparathyroidism (covered as medically necessary)     Sexually Transmitted Diseases (STDs) As necessary chlamydia, gonorrhea, syphilis, hepatitis B (covered annually if not pregnant, more often in pregnancy)  HIV if any 1 risk factor present: 1) <14yo or >66yo and at increased risk, 2) 15-66yo and ask for it, or 3) pregnant (covered annually if not pregnant, up to 3x in pregnancy)     Vaccine: Hepatitis B As necessary if medium or high risk (series covered once)     Vaccine: Influenza All patients without contraindications (covered annually.)     Vaccine: Pneumococcal Pneumovax + Prevnar (both covered, >=11 months apart)     Vaccine: Shingrix (Shingles) >= 49yo without contraindications             (2 doses, 2 months apart)     Other Services               Women's Personalized Prevention Plan Services (PPPS)                                           Health Risk Factors and Interventions  \"x\" Indicates risk is associated with this patient Risk Factor Recommended Interventions     Obesity     Hypertension     Diabetes     Fall Risk     Tobacco Use     Other:      Routine advice given on diet/exercise/weight  Recommend monthly self breast exams  Discussed colorectal screening - pros/cons of colonoscopy vs stool FIT - declines  Osteoporosis screening " discussed - dexa due 8/19  Recommend calcium with D3  Immunization update discussed - [facility]     STOP ASPIRIN

## 2019-03-25 NOTE — PROGRESS NOTES
Subjective     Thelma Gomes is a 83 y.o. female who presents for a subsequent annual wellness visit.     Comprehensive Medical and Social History  Patient Active Problem List   Diagnosis   • Congestive heart failure (CMS/HCC) (Formerly [facility])   • Type 2 diabetes mellitus without complication, without long-term current use of insulin (CMS/HCC)   • Essential hypertension   • Gastroesophageal reflux disease without esophagitis   • Mixed hyperlipidemia   • PVD (peripheral vascular disease) (CMS/HCC) (Formerly [facility])   • Sleep disorder   • Age-related osteoporosis without current pathological fracture   • Vitamin D deficiency     No past medical history on file.  Past Surgical History:   Procedure Laterality Date   •  SECTION     • CORONARY STENT PLACEMENT       Allergies   Allergen Reactions   • Cephalexin Monohydrate    • Cortisone Acetate    • Morphine      Other reaction(s): Rash     Current Outpatient Prescriptions   Medication Sig Dispense Refill   • amLODIPine (NORVASC) 10 mg tablet TAKE ONE TABLET BY MOUTH EVERY DAY 90 tablet 0   • aspirin 325 mg tablet 325 mg.     • blood sugar diagnostic (TRUETEST TEST STRIPS) strip use for daily glucose trsting     • cholecalciferol, vitamin D3, (VITAMIN D3) 4,000 unit capsule take one capsule by oral route  every day with food     • clopidogrel (PLAVIX) 75 mg tablet TAKE ONE TABLET BY MOUTH EVERY DAY 90 tablet 0   • glyburide-metformin (GLUCOVANCE) 5-500 mg per tablet TAKE ONE TABLET BY MOUTH DAILY WITH DINNER 90 tablet 0   • lancets (freestyle) 28 gauge misc test once a day     • pantoprazole (PROTONIX) 40 mg EC tablet TAKE ONE TABLET BY MOUTH EVERY DAY 30 tablet 3   • pioglitazone (ACTOS) 15 mg tablet Take 1 tablet (15 mg total) by mouth once daily. 90 tablet 1   • simvastatin (ZOCOR) 40 mg tablet Take 1 tablet (40 mg total) by mouth every evening. 90 tablet 0   • cyclobenzaprine (FLEXERIL) 5 mg tablet Take 1 tablet (5 mg total) by mouth 2 (two) times a day as needed for muscle  spasms for up to 14 days. 20 tablet 0   • pioglitazone (ACTOS) 15 mg tablet TAKE ONE TABLET BY MOUTH EVERY DAY 90 tablet 0   • pioglitazone (ACTOS) 15 mg tablet TAKE ONE TABLET BY MOUTH EVERY DAY 30 tablet 3   • zolpidem (AMBIEN) 10 mg tablet TAKE ONE TABLET BY MOUTH EVERY EVENING AT BEDTIME AS NEEDED FOR SLEEP 30 tablet 0     No current facility-administered medications for this visit.      Social History     Social History   • Marital status:      Spouse name: N/A   • Number of children: N/A   • Years of education: N/A     Social History Main Topics   • Smoking status: Former Smoker   • Smokeless tobacco: Never Used   • Alcohol use No   • Drug use: Unknown   • Sexual activity: Not Asked     Other Topics Concern   • None     Social History Narrative   • None       Objective   Vitals  Vitals:    03/25/19 0900   BP: 132/74   Pulse: 66   Resp: 16   Temp: 36.6 °C (97.8 °F)   SpO2: 96%   Weight: 86.2 kg (190 lb)   Height: 1.524 m (5')     Body mass index is 37.11 kg/m².    Advanced Care Plan  Does patient have advance directive?: Yes                                     PHQ  Over the Past 2 Weeks, Have You Felt Down, Depressed or Hopeless?: no  Over the Past 2 Weeks, Have You Felt Little Interest or Pleasure In Doing Things?: no                                          Mini Cog  Completed: Yes  Score: 4  Result: Negative    Get Up and Go  Result: Pass            STEADI Falls Risk  One or more falls in the last year: No                                                       Falls screen completed: Yes       Hearing and Vision Screening  No exam data present      Assessment/Plan   Diagnoses and all orders for this visit:    Age-related osteoporosis without current pathological fracture (Primary)  -     denosumab (PROLIA) syringe 60 mg; Inject 1 mL (60 mg total) under the skin once.         See Patient Instructions (the written plan) which was given to the patient for PPPS and health risk factors with interventions.

## 2019-03-25 NOTE — PROGRESS NOTES
Subjective      Patient ID: Thelma Gomes is a 83 y.o. female.    HPI    Generally well  Following diet - walking with cane  Following with dr silvio CLARK wounds - healing      The following have been reviewed and updated as appropriate in this visit:  Problems       Review of Systems   Constitutional: Negative for chills, fatigue and unexpected weight change.   Eyes: Negative for visual disturbance.   Respiratory: Negative for cough and shortness of breath.    Cardiovascular: Negative for chest pain and palpitations.   Gastrointestinal: Negative for abdominal pain, blood in stool, constipation, diarrhea and nausea.   Endocrine: Negative for cold intolerance and heat intolerance.   Genitourinary: Negative for dysuria, frequency, hematuria and urgency.   Musculoskeletal: Negative for arthralgias.   Skin: Negative for color change.   Neurological: Negative for dizziness, numbness and headaches.   Hematological: Negative for adenopathy. Does not bruise/bleed easily.   Psychiatric/Behavioral: Negative for sleep disturbance.       Current Outpatient Prescriptions   Medication Sig Dispense Refill   • amLODIPine (NORVASC) 10 mg tablet TAKE ONE TABLET BY MOUTH EVERY DAY 90 tablet 0   • blood sugar diagnostic (TRUETEST TEST STRIPS) strip use for daily glucose trsting     • cholecalciferol, vitamin D3, (VITAMIN D3) 4,000 unit capsule take one capsule by oral route  every day with food     • clopidogrel (PLAVIX) 75 mg tablet TAKE ONE TABLET BY MOUTH EVERY DAY 90 tablet 0   • glyburide-metformin (GLUCOVANCE) 5-500 mg per tablet TAKE ONE TABLET BY MOUTH DAILY WITH DINNER 90 tablet 0   • lancets (freestyle) 28 gauge misc test once a day     • pantoprazole (PROTONIX) 40 mg EC tablet TAKE ONE TABLET BY MOUTH EVERY DAY 30 tablet 3   • pioglitazone (ACTOS) 15 mg tablet Take 1 tablet (15 mg total) by mouth once daily. 90 tablet 1   • simvastatin (ZOCOR) 40 mg tablet Take 1 tablet (40 mg total) by mouth every evening. 90 tablet 0    • zolpidem (AMBIEN) 10 mg tablet TAKE ONE TABLET BY MOUTH EVERY EVENING AT BEDTIME AS NEEDED FOR SLEEP 30 tablet 0     No current facility-administered medications for this visit.      No past medical history on file.  Family History   Problem Relation Age of Onset   • Diabetes Mother    • Breast cancer Sister    • Hypertension Sister    • Stroke Sister    • Lung cancer Brother      Past Surgical History:   Procedure Laterality Date   •  SECTION     • CORONARY STENT PLACEMENT       Allergies   Allergen Reactions   • Cephalexin Monohydrate    • Cortisone Acetate    • Morphine      Other reaction(s): Rash     Social History     Social History   • Marital status:      Spouse name: N/A   • Number of children: N/A   • Years of education: N/A     Occupational History   • Not on file.     Social History Main Topics   • Smoking status: Former Smoker   • Smokeless tobacco: Never Used   • Alcohol use No   • Drug use: Unknown   • Sexual activity: Not on file     Other Topics Concern   • Not on file     Social History Narrative   • No narrative on file     Vitals:    19 0900   BP: 132/74   Pulse: 66   Resp: 16   Temp: 36.6 °C (97.8 °F)   SpO2: 96%   Weight: 86.2 kg (190 lb)   Height: 1.524 m (5')       Objective     Physical Exam   Constitutional: She is oriented to person, place, and time. She appears well-developed and well-nourished.   HENT:   Head: Normocephalic.   Right Ear: Tympanic membrane normal.   Left Ear: Tympanic membrane normal.   Nose: Nose normal.   Mouth/Throat: Uvula is midline and oropharynx is clear and moist.   Eyes: EOM and lids are normal. Pupils are equal, round, and reactive to light. Lids are everted and swept, no foreign bodies found.   Fundoscopic exam:       The right eye shows no AV nicking.        The left eye shows no AV nicking.   Neck: Carotid bruit is present. No thyromegaly present.   Cardiovascular: Normal rate, regular rhythm, intact distal pulses and normal pulses.     Murmur heard.   Systolic murmur is present with a grade of 2/6   Pulmonary/Chest: Effort normal and breath sounds normal.   Abdominal: Soft. Normal appearance, normal aorta and bowel sounds are normal. She exhibits no abdominal bruit. There is no hepatosplenomegaly. There is no tenderness. Hernia confirmed negative in the right inguinal area and confirmed negative in the left inguinal area.   Genitourinary: Vagina normal. Right adnexum displays no mass. Left adnexum displays no mass.   Lymphadenopathy:     She has no cervical adenopathy.     She has no axillary adenopathy.   Neurological: She is alert and oriented to person, place, and time.   Skin: Skin is warm. No rash noted.   Left leg with breakdown small open area anterior   Psychiatric: She has a normal mood and affect. Her speech is normal and behavior is normal. Cognition and memory are normal.       Assessment/Plan   Problem List Items Addressed This Visit     Chronic systolic congestive heart failure (CMS/HCC) (HCC)    Type 2 diabetes mellitus without complication, without long-term current use of insulin (CMS/HCC)     Good diabetic control.  Current medications reviewed.  Recommend diet modifications and weight loss.   Met with patient to review diet, exercise, and glucometer readings  Reviewed medications and the importance of compliance  Diabetes health maintenance plan and follow up was discussed and understood by patient         Relevant Orders    Hemoglobin A1c    Microalbumin / creatinine urine ratio    Essential hypertension     Hypertension well controlled  Recommend regular exercise and low salt diet  Risk and potential complications of hypertension discussed  Importance of medication compliance and regular follow up emphasized  Role of medication/diet/exercise in treating hypertension discussed  Treatment plan and follow up reviewed and understood by patient         Relevant Orders    Comprehensive metabolic panel    Mixed hyperlipidemia      GOOD hyperlipidemia control  CONTINUE CURRENT THERAPY  Recommend low fat diet - Risks and potential complications of hyperlipidemia reviewed  Role of medications,diet, exercise in the treatment of hyperlipidemia discussed   Treatment plan and follow up reviewed and understood by patient         Relevant Orders    Lipid panel    PVD (peripheral vascular disease) (CMS/HCC) (Formerly [facility])     Continue f/u with vascular  Continue wound care         Age-related osteoporosis without current pathological fracture     Continue prolia  Serial dexa          Relevant Medications    denosumab (PROLIA) syringe 60 mg (Completed)    Vitamin D deficiency     Check labs re dosing - benefits vitamin D discussed  Recent labs reviewed and discussed with pt            Other Visit Diagnoses     Medicare annual wellness visit, subsequent    -  Primary          Traci Katz,   3/26/2019

## 2019-03-26 ASSESSMENT — ENCOUNTER SYMPTOMS
HEMATURIA: 0
HEADACHES: 0
ARTHRALGIAS: 0
FREQUENCY: 0
COUGH: 0
SHORTNESS OF BREATH: 0
BLOOD IN STOOL: 0
UNEXPECTED WEIGHT CHANGE: 0
NAUSEA: 0
ABDOMINAL PAIN: 0
DIZZINESS: 0
DYSURIA: 0
DIARRHEA: 0
BRUISES/BLEEDS EASILY: 0
FATIGUE: 0
COLOR CHANGE: 0
SLEEP DISTURBANCE: 0
ADENOPATHY: 0
CHILLS: 0
NUMBNESS: 0
PALPITATIONS: 0
CONSTIPATION: 0

## 2019-03-28 RX ORDER — ZOLPIDEM TARTRATE 10 MG/1
TABLET ORAL
Qty: 30 TABLET | Refills: 0 | Status: SHIPPED | OUTPATIENT
Start: 2019-03-28 | End: 2019-04-29 | Stop reason: SDUPTHER

## 2019-04-15 RX ORDER — CLOPIDOGREL BISULFATE 75 MG/1
TABLET ORAL
Qty: 90 TABLET | Refills: 1 | Status: SHIPPED | OUTPATIENT
Start: 2019-04-15 | End: 2019-08-27 | Stop reason: SDUPTHER

## 2019-04-15 RX ORDER — AMLODIPINE BESYLATE 10 MG/1
TABLET ORAL
Qty: 90 TABLET | Refills: 1 | Status: SHIPPED | OUTPATIENT
Start: 2019-04-15 | End: 2019-08-27 | Stop reason: SDUPTHER

## 2019-04-19 RX ORDER — ZOLPIDEM TARTRATE 10 MG/1
TABLET ORAL
Qty: 30 TABLET | Refills: 0 | OUTPATIENT
Start: 2019-04-19

## 2019-04-19 NOTE — TELEPHONE ENCOUNTER
Last filled: 3/28/201  Last seen: 3/25/2019  Next appt:  9/26/2019  please advise. Lulú Shaw MA

## 2019-04-19 NOTE — TELEPHONE ENCOUNTER
PMPAWARxE website queried no abnormalities noted.  Patient is only receiving controlled substances from my office    Refilled 3/28 - denied

## 2019-04-25 ENCOUNTER — TELEPHONE (OUTPATIENT)
Dept: FAMILY MEDICINE | Facility: CLINIC | Age: 84
End: 2019-04-25

## 2019-04-25 NOTE — TELEPHONE ENCOUNTER
Pt LM on nurse line that ambien will no longer be covered on her Rx plan. TC to pt and LM requesting she call the office. To attempt PA, we need the name of her Rx plan, ID #, Rx bin # and phone number

## 2019-04-29 RX ORDER — ZOLPIDEM TARTRATE 10 MG/1
TABLET ORAL
Qty: 30 TABLET | Refills: 0 | Status: SHIPPED | OUTPATIENT
Start: 2019-04-29 | End: 2019-05-14 | Stop reason: SDUPTHER

## 2019-04-29 NOTE — TELEPHONE ENCOUNTER
Prior auth ambien submitted on cover my meds  [facility], [place] [id number] RXBIN [id number] [phone]

## 2019-05-10 RX ORDER — SIMVASTATIN 40 MG/1
40 TABLET, FILM COATED ORAL EVERY EVENING
Qty: 90 TABLET | Refills: 1 | Status: SHIPPED | OUTPATIENT
Start: 2019-05-10 | End: 2019-08-01 | Stop reason: SDUPTHER

## 2019-05-13 RX ORDER — PANTOPRAZOLE SODIUM 40 MG/1
TABLET, DELAYED RELEASE ORAL
Qty: 30 TABLET | Refills: 3 | Status: SHIPPED | OUTPATIENT
Start: 2019-05-13 | End: 2019-08-27 | Stop reason: SDUPTHER

## 2019-05-15 RX ORDER — ZOLPIDEM TARTRATE 12.5 MG/1
TABLET, FILM COATED, EXTENDED RELEASE ORAL
Qty: 30 TABLET | OUTPATIENT
Start: 2019-05-15

## 2019-05-15 RX ORDER — ZOLPIDEM TARTRATE 10 MG/1
TABLET ORAL
Qty: 30 TABLET | Refills: 0 | Status: SHIPPED | OUTPATIENT
Start: 2019-05-15 | End: 2019-06-28 | Stop reason: SDUPTHER

## 2019-05-22 RX ORDER — PIOGLITAZONEHYDROCHLORIDE 15 MG/1
15 TABLET ORAL
Qty: 90 TABLET | Refills: 1 | Status: SHIPPED | OUTPATIENT
Start: 2019-05-22 | End: 2019-08-27 | Stop reason: SDUPTHER

## 2019-06-17 RX ORDER — ZOLPIDEM TARTRATE 10 MG/1
TABLET ORAL
Qty: 30 TABLET | OUTPATIENT
Start: 2019-06-17

## 2019-06-17 NOTE — TELEPHONE ENCOUNTER
PMPAWARxE website queried no abnormalities noted.  Patient is only receiving controlled substances from my office    Last filled 5/23 - cannot fill until 6/21

## 2019-06-17 NOTE — TELEPHONE ENCOUNTER
Medicine Refill Request  Last fill: 5/22/2019  Last Office Visit: 3/25/2019  Next Office Visit: 9/26/2019        Current Outpatient Prescriptions:   •  amLODIPine (NORVASC) 10 mg tablet, TAKE ONE TABLET BY MOUTH EVERY DAY, Disp: 90 tablet, Rfl: 1  •  blood sugar diagnostic (TRUETEST TEST STRIPS) strip, use for daily glucose trsting, Disp: , Rfl:   •  cholecalciferol, vitamin D3, (VITAMIN D3) 4,000 unit capsule, take one capsule by oral route  every day with food, Disp: , Rfl:   •  clopidogrel (PLAVIX) 75 mg tablet, TAKE ONE TABLET BY MOUTH EVERY DAY, Disp: 90 tablet, Rfl: 1  •  glyburide-metformin (GLUCOVANCE) 5-500 mg per tablet, TAKE ONE TABLET BY MOUTH DAILY WITH DINNER, Disp: 90 tablet, Rfl: 0  •  lancets (freestyle) 28 gauge misc, test once a day, Disp: , Rfl:   •  pantoprazole (PROTONIX) 40 mg EC tablet, TAKE ONE TABLET BY MOUTH EVERY DAY, Disp: 30 tablet, Rfl: 3  •  pioglitazone (ACTOS) 15 mg tablet, Take 1 tablet (15 mg total) by mouth once daily., Disp: 90 tablet, Rfl: 1  •  simvastatin (ZOCOR) 40 mg tablet, Take 1 tablet (40 mg total) by mouth every evening., Disp: 90 tablet, Rfl: 1  •  zolpidem (AMBIEN) 10 mg tablet, TAKE ONE TABLET BY MOUTH AT BEDTIME AS NEEDED FOR SLEEP, Disp: 30 tablet, Rfl: 0      BP Readings from Last 3 Encounters:   03/25/19 132/74   02/21/19 128/70   09/24/18 124/70       Recent Lab results:  Lab Results   Component Value Date    CHOL 172 03/15/2019   ,   Lab Results   Component Value Date    HDL 59 03/15/2019   ,   Lab Results   Component Value Date    LDLCALC 93 03/15/2019   ,   Lab Results   Component Value Date    TRIG 108 03/15/2019        Lab Results   Component Value Date    GLUCOSE 92 03/15/2019   ,   Lab Results   Component Value Date    HGBA1C 5.9 (H) 03/15/2019         Lab Results   Component Value Date    CREATININE 0.86 03/15/2019       Lab Results   Component Value Date    TSH 2.15 03/15/2019

## 2019-06-28 RX ORDER — GLYBURIDE-METFORMIN HYDROCHLORIDE 5; 500 MG/1; MG/1
TABLET ORAL
Qty: 90 TABLET | Refills: 0 | Status: SHIPPED | OUTPATIENT
Start: 2019-06-28 | End: 2019-08-15 | Stop reason: SDUPTHER

## 2019-06-28 RX ORDER — ZOLPIDEM TARTRATE 10 MG/1
TABLET ORAL
Qty: 30 TABLET | Refills: 0 | Status: SHIPPED | OUTPATIENT
Start: 2019-06-28 | End: 2019-07-29 | Stop reason: SDUPTHER

## 2019-06-28 NOTE — TELEPHONE ENCOUNTER
Medicine Refill Request  Query Rx  Last Office Visit: 3/25/2019  Next Office Visit: 9/26/2019        Current Outpatient Prescriptions:   •  amLODIPine (NORVASC) 10 mg tablet, TAKE ONE TABLET BY MOUTH EVERY DAY, Disp: 90 tablet, Rfl: 1  •  blood sugar diagnostic (TRUETEST TEST STRIPS) strip, use for daily glucose trsting, Disp: , Rfl:   •  cholecalciferol, vitamin D3, (VITAMIN D3) 4,000 unit capsule, take one capsule by oral route  every day with food, Disp: , Rfl:   •  clopidogrel (PLAVIX) 75 mg tablet, TAKE ONE TABLET BY MOUTH EVERY DAY, Disp: 90 tablet, Rfl: 1  •  glyburide-metformin (GLUCOVANCE) 5-500 mg per tablet, TAKE ONE TABLET BY MOUTH DAILY WITH DINNER, Disp: 90 tablet, Rfl: 0  •  lancets (freestyle) 28 gauge misc, test once a day, Disp: , Rfl:   •  pantoprazole (PROTONIX) 40 mg EC tablet, TAKE ONE TABLET BY MOUTH EVERY DAY, Disp: 30 tablet, Rfl: 3  •  pioglitazone (ACTOS) 15 mg tablet, Take 1 tablet (15 mg total) by mouth once daily., Disp: 90 tablet, Rfl: 1  •  simvastatin (ZOCOR) 40 mg tablet, Take 1 tablet (40 mg total) by mouth every evening., Disp: 90 tablet, Rfl: 1  •  zolpidem (AMBIEN) 10 mg tablet, TAKE ONE TABLET BY MOUTH AT BEDTIME AS NEEDED FOR SLEEP, Disp: 30 tablet, Rfl: 0      BP Readings from Last 3 Encounters:   03/25/19 132/74   02/21/19 128/70   09/24/18 124/70       Recent Lab results:  Lab Results   Component Value Date    CHOL 172 03/15/2019   ,   Lab Results   Component Value Date    HDL 59 03/15/2019   ,   Lab Results   Component Value Date    LDLCALC 93 03/15/2019   ,   Lab Results   Component Value Date    TRIG 108 03/15/2019        Lab Results   Component Value Date    GLUCOSE 92 03/15/2019   ,   Lab Results   Component Value Date    HGBA1C 5.9 (H) 03/15/2019         Lab Results   Component Value Date    CREATININE 0.86 03/15/2019       Lab Results   Component Value Date    TSH 2.15 03/15/2019

## 2019-07-11 RX ORDER — ZOLPIDEM TARTRATE 10 MG/1
TABLET ORAL
Qty: 30 TABLET | Refills: 0 | OUTPATIENT
Start: 2019-07-11

## 2019-07-11 NOTE — TELEPHONE ENCOUNTER
Medicine Refill Request    Last Office Visit: 3/25/2019  Next Office Visit: 9/26/2019        Current Outpatient Prescriptions:   •  amLODIPine (NORVASC) 10 mg tablet, TAKE ONE TABLET BY MOUTH EVERY DAY, Disp: 90 tablet, Rfl: 1  •  blood sugar diagnostic (TRUETEST TEST STRIPS) strip, use for daily glucose trsting, Disp: , Rfl:   •  cholecalciferol, vitamin D3, (VITAMIN D3) 4,000 unit capsule, take one capsule by oral route  every day with food, Disp: , Rfl:   •  clopidogrel (PLAVIX) 75 mg tablet, TAKE ONE TABLET BY MOUTH EVERY DAY, Disp: 90 tablet, Rfl: 1  •  glyburide-metformin (GLUCOVANCE) 5-500 mg per tablet, TAKE ONE TABLET BY MOUTH DAILY WITH DINNER, Disp: 90 tablet, Rfl: 0  •  lancets (freestyle) 28 gauge misc, test once a day, Disp: , Rfl:   •  pantoprazole (PROTONIX) 40 mg EC tablet, TAKE ONE TABLET BY MOUTH EVERY DAY, Disp: 30 tablet, Rfl: 3  •  pioglitazone (ACTOS) 15 mg tablet, Take 1 tablet (15 mg total) by mouth once daily., Disp: 90 tablet, Rfl: 1  •  simvastatin (ZOCOR) 40 mg tablet, Take 1 tablet (40 mg total) by mouth every evening., Disp: 90 tablet, Rfl: 1  •  zolpidem (AMBIEN) 10 mg tablet, TAKE ONE TABLET BY MOUTH AT BEDTIME AS NEEDED FOR SLEEP, Disp: 30 tablet, Rfl: 0      BP Readings from Last 3 Encounters:   03/25/19 132/74   02/21/19 128/70   09/24/18 124/70       Recent Lab results:  Lab Results   Component Value Date    CHOL 172 03/15/2019   ,   Lab Results   Component Value Date    HDL 59 03/15/2019   ,   Lab Results   Component Value Date    LDLCALC 93 03/15/2019   ,   Lab Results   Component Value Date    TRIG 108 03/15/2019        Lab Results   Component Value Date    GLUCOSE 92 03/15/2019   ,   Lab Results   Component Value Date    HGBA1C 5.9 (H) 03/15/2019         Lab Results   Component Value Date    CREATININE 0.86 03/15/2019       Lab Results   Component Value Date    TSH 2.15 03/15/2019

## 2019-07-11 NOTE — TELEPHONE ENCOUNTER
[employer] website queried no abnormalities noted.  Patient is only receiving controlled substances from my office    Sent 6/28/19 # 30   refil denied - lt pt know

## 2019-07-20 RX ORDER — ZOLPIDEM TARTRATE 10 MG/1
TABLET ORAL
Qty: 30 TABLET | Refills: 0 | OUTPATIENT
Start: 2019-07-20

## 2019-07-20 NOTE — TELEPHONE ENCOUNTER
[employer] website queried no abnormalities noted.  Patient is only receiving controlled substances from my office    Last refil 6/28 - request too soon

## 2019-07-29 RX ORDER — ZOLPIDEM TARTRATE 10 MG/1
TABLET ORAL
Qty: 30 TABLET | Refills: 0 | Status: SHIPPED | OUTPATIENT
Start: 2019-07-29 | End: 2019-08-27 | Stop reason: SDUPTHER

## 2019-08-01 RX ORDER — SIMVASTATIN 40 MG/1
40 TABLET, FILM COATED ORAL EVERY EVENING
Qty: 90 TABLET | Refills: 1 | Status: SHIPPED | OUTPATIENT
Start: 2019-08-01 | End: 2019-09-26 | Stop reason: SDUPTHER

## 2019-08-15 RX ORDER — GLYBURIDE-METFORMIN HYDROCHLORIDE 5; 500 MG/1; MG/1
TABLET ORAL
Qty: 90 TABLET | Refills: 0 | Status: SHIPPED | OUTPATIENT
Start: 2019-08-15 | End: 2019-09-26 | Stop reason: SDUPTHER

## 2019-08-20 ENCOUNTER — TELEPHONE (OUTPATIENT)
Dept: FAMILY MEDICINE | Facility: CLINIC | Age: 84
End: 2019-08-20

## 2019-08-23 LAB — HBA1C MFR BLD: 5.7 %

## 2019-08-26 ENCOUNTER — TELEPHONE (OUTPATIENT)
Dept: FAMILY MEDICINE | Facility: CLINIC | Age: 84
End: 2019-08-26

## 2019-08-27 RX ORDER — PANTOPRAZOLE SODIUM 40 MG/1
40 TABLET, DELAYED RELEASE ORAL
Qty: 90 TABLET | Refills: 1 | Status: SHIPPED | OUTPATIENT
Start: 2019-08-27 | End: 2019-09-26 | Stop reason: SDUPTHER

## 2019-08-27 RX ORDER — CLOPIDOGREL BISULFATE 75 MG/1
75 TABLET ORAL
Qty: 90 TABLET | Refills: 1 | Status: SHIPPED | OUTPATIENT
Start: 2019-08-27 | End: 2019-09-26 | Stop reason: SDUPTHER

## 2019-08-27 RX ORDER — AMLODIPINE BESYLATE 10 MG/1
10 TABLET ORAL
Qty: 90 TABLET | Refills: 1 | Status: SHIPPED | OUTPATIENT
Start: 2019-08-27 | End: 2019-09-26 | Stop reason: SDUPTHER

## 2019-08-27 RX ORDER — PIOGLITAZONEHYDROCHLORIDE 15 MG/1
15 TABLET ORAL
Qty: 90 TABLET | Refills: 1 | Status: SHIPPED | OUTPATIENT
Start: 2019-08-27 | End: 2019-09-26 | Stop reason: SDUPTHER

## 2019-08-27 RX ORDER — ZOLPIDEM TARTRATE 10 MG/1
TABLET ORAL
Qty: 30 TABLET | Refills: 0 | Status: SHIPPED | OUTPATIENT
Start: 2019-08-27 | End: 2019-09-26 | Stop reason: SDUPTHER

## 2019-09-09 RX ORDER — PIOGLITAZONEHYDROCHLORIDE 15 MG/1
TABLET ORAL
Qty: 90 TABLET | Refills: 1 | Status: SHIPPED | OUTPATIENT
Start: 2019-09-09 | End: 2020-10-26

## 2019-09-09 NOTE — TELEPHONE ENCOUNTER
Medicine Refill Request    Last Office Visit: 3/25/2019  Next Office Visit: 9/26/2019        Current Outpatient Prescriptions:   •  amLODIPine (NORVASC) 10 mg tablet, Take 1 tablet (10 mg total) by mouth once daily., Disp: 90 tablet, Rfl: 1  •  blood sugar diagnostic (TRUETEST TEST STRIPS) strip, use for daily glucose trsting, Disp: , Rfl:   •  cholecalciferol, vitamin D3, (VITAMIN D3) 4,000 unit capsule, take one capsule by oral route  every day with food, Disp: , Rfl:   •  clopidogrel (PLAVIX) 75 mg tablet, Take 1 tablet (75 mg total) by mouth once daily., Disp: 90 tablet, Rfl: 1  •  glyburide-metformin (GLUCOVANCE) 5-500 mg per tablet, TAKE ONE TABLET BY MOUTH DAILY WITH DINNER, Disp: 90 tablet, Rfl: 0  •  lancets (freestyle) 28 gauge misc, test once a day, Disp: , Rfl:   •  pantoprazole (PROTONIX) 40 mg EC tablet, Take 1 tablet (40 mg total) by mouth once daily., Disp: 90 tablet, Rfl: 1  •  pioglitazone (ACTOS) 15 mg tablet, Take 1 tablet (15 mg total) by mouth once daily., Disp: 90 tablet, Rfl: 1  •  simvastatin (ZOCOR) 40 mg tablet, Take 1 tablet (40 mg total) by mouth every evening., Disp: 90 tablet, Rfl: 1  •  zolpidem (AMBIEN) 10 mg tablet, TAKE ONE TABLET BY MOUTH AT BEDTIME AS NEEDED FOR SLEEP, Disp: 30 tablet, Rfl: 0      BP Readings from Last 3 Encounters:   03/25/19 132/74   02/21/19 128/70   09/24/18 124/70       Recent Lab results:  Lab Results   Component Value Date    CHOL 172 03/15/2019   ,   Lab Results   Component Value Date    HDL 59 03/15/2019   ,   Lab Results   Component Value Date    LDLCALC 93 03/15/2019   ,   Lab Results   Component Value Date    TRIG 108 03/15/2019        Lab Results   Component Value Date    GLUCOSE 92 03/15/2019   ,   Lab Results   Component Value Date    HGBA1C 5.7 08/23/2019         Lab Results   Component Value Date    CREATININE 0.86 03/15/2019       Lab Results   Component Value Date    TSH 2.15 03/15/2019

## 2019-09-23 RX ORDER — ZOLPIDEM TARTRATE 10 MG/1
TABLET ORAL
Qty: 30 TABLET | OUTPATIENT
Start: 2019-09-23

## 2019-09-26 ENCOUNTER — OFFICE VISIT (OUTPATIENT)
Dept: FAMILY MEDICINE | Facility: CLINIC | Age: 84
End: 2019-09-26
Payer: MEDICARE

## 2019-09-26 VITALS
BODY MASS INDEX: 37.3 KG/M2 | DIASTOLIC BLOOD PRESSURE: 80 MMHG | TEMPERATURE: 98 F | SYSTOLIC BLOOD PRESSURE: 128 MMHG | HEIGHT: 60 IN | RESPIRATION RATE: 16 BRPM | OXYGEN SATURATION: 95 % | HEART RATE: 74 BPM | WEIGHT: 190 LBS

## 2019-09-26 DIAGNOSIS — E78.2 MIXED HYPERLIPIDEMIA: ICD-10-CM

## 2019-09-26 DIAGNOSIS — E55.9 VITAMIN D DEFICIENCY: ICD-10-CM

## 2019-09-26 DIAGNOSIS — G47.9 SLEEP DISORDER: ICD-10-CM

## 2019-09-26 DIAGNOSIS — M81.0 AGE-RELATED OSTEOPOROSIS WITHOUT CURRENT PATHOLOGICAL FRACTURE: ICD-10-CM

## 2019-09-26 DIAGNOSIS — K21.9 GASTROESOPHAGEAL REFLUX DISEASE WITHOUT ESOPHAGITIS: ICD-10-CM

## 2019-09-26 DIAGNOSIS — E11.9 TYPE 2 DIABETES MELLITUS WITHOUT COMPLICATION, WITHOUT LONG-TERM CURRENT USE OF INSULIN (CMS/HCC): Primary | ICD-10-CM

## 2019-09-26 DIAGNOSIS — I10 ESSENTIAL HYPERTENSION: ICD-10-CM

## 2019-09-26 DIAGNOSIS — I73.9 PVD (PERIPHERAL VASCULAR DISEASE) (CMS/HCC): ICD-10-CM

## 2019-09-26 PROCEDURE — 96372 THER/PROPH/DIAG INJ SC/IM: CPT | Performed by: FAMILY MEDICINE

## 2019-09-26 PROCEDURE — 99214 OFFICE O/P EST MOD 30 MIN: CPT | Mod: 25 | Performed by: FAMILY MEDICINE

## 2019-09-26 RX ORDER — PIOGLITAZONEHYDROCHLORIDE 15 MG/1
15 TABLET ORAL
Qty: 90 TABLET | Refills: 1 | Status: CANCELLED | OUTPATIENT
Start: 2019-09-26

## 2019-09-26 RX ORDER — ZOLPIDEM TARTRATE 10 MG/1
TABLET ORAL
Qty: 30 TABLET | Refills: 0 | Status: SHIPPED | OUTPATIENT
Start: 2019-09-26 | End: 2019-10-28 | Stop reason: SDUPTHER

## 2019-09-26 RX ORDER — PIOGLITAZONEHYDROCHLORIDE 15 MG/1
15 TABLET ORAL
Qty: 90 TABLET | Refills: 1 | Status: SHIPPED | OUTPATIENT
Start: 2019-09-26 | End: 2019-12-30 | Stop reason: SDUPTHER

## 2019-09-26 RX ORDER — SIMVASTATIN 40 MG/1
40 TABLET, FILM COATED ORAL EVERY EVENING
Qty: 90 TABLET | Refills: 1 | Status: SHIPPED | OUTPATIENT
Start: 2019-09-26 | End: 2019-12-30 | Stop reason: SDUPTHER

## 2019-09-26 RX ORDER — AMLODIPINE BESYLATE 10 MG/1
10 TABLET ORAL
Qty: 90 TABLET | Refills: 1 | Status: SHIPPED | OUTPATIENT
Start: 2019-09-26 | End: 2019-12-30 | Stop reason: SDUPTHER

## 2019-09-26 RX ORDER — PANTOPRAZOLE SODIUM 40 MG/1
40 TABLET, DELAYED RELEASE ORAL
Qty: 90 TABLET | Refills: 1 | Status: SHIPPED | OUTPATIENT
Start: 2019-09-26 | End: 2019-12-20 | Stop reason: SDUPTHER

## 2019-09-26 RX ORDER — GLYBURIDE-METFORMIN HYDROCHLORIDE 5; 500 MG/1; MG/1
TABLET ORAL
Qty: 90 TABLET | Refills: 1 | Status: SHIPPED | OUTPATIENT
Start: 2019-09-26 | End: 2019-12-30 | Stop reason: SDUPTHER

## 2019-09-26 RX ORDER — CLOPIDOGREL BISULFATE 75 MG/1
75 TABLET ORAL
Qty: 90 TABLET | Refills: 1 | Status: SHIPPED | OUTPATIENT
Start: 2019-09-26 | End: 2019-12-30 | Stop reason: SDUPTHER

## 2019-09-26 ASSESSMENT — ENCOUNTER SYMPTOMS
FATIGUE: 0
FREQUENCY: 0
ABDOMINAL PAIN: 0
BLOOD IN STOOL: 0
SHORTNESS OF BREATH: 0
DIZZINESS: 0
UNEXPECTED WEIGHT CHANGE: 0
HEADACHES: 0
COUGH: 0
NUMBNESS: 0
SLEEP DISTURBANCE: 0
HEMATURIA: 0
COLOR CHANGE: 0
CHILLS: 0
ARTHRALGIAS: 0
DYSURIA: 0
ADENOPATHY: 0
PALPITATIONS: 0
DIARRHEA: 0
NAUSEA: 0
BRUISES/BLEEDS EASILY: 0
CONSTIPATION: 0

## 2019-09-26 NOTE — PROGRESS NOTES
Subjective      Patient ID: Thelma Gomes is a 83 y.o. female.    HPI     Medical management of diabetes, hypertension, hyperlipidemia  Pre visit care team ting performed  A report card has been prepared for today's visit  Following diet - walking treadmill 20/10 min intervals  Concerns : NO      The following have been reviewed and updated as appropriate in this visit:       Review of Systems   Constitutional: Negative for chills, fatigue and unexpected weight change.   Eyes: Negative for visual disturbance (eye exam DUE).   Respiratory: Negative for cough and shortness of breath.    Cardiovascular: Negative for chest pain, palpitations and leg swelling.   Gastrointestinal: Negative for abdominal pain, blood in stool, constipation, diarrhea and nausea.   Endocrine: Negative for cold intolerance and heat intolerance.   Genitourinary: Negative for dysuria, frequency, hematuria and urgency.   Musculoskeletal: Negative for arthralgias.   Skin: Negative for color change.   Neurological: Negative for dizziness, numbness and headaches.   Hematological: Negative for adenopathy. Does not bruise/bleed easily.   Psychiatric/Behavioral: Negative for sleep disturbance.       Current Outpatient Prescriptions   Medication Sig Dispense Refill   • amLODIPine (NORVASC) 10 mg tablet Take 1 tablet (10 mg total) by mouth once daily. 90 tablet 1   • blood sugar diagnostic (TRUETEST TEST STRIPS) strip use for daily glucose trsting     • cholecalciferol, vitamin D3, (VITAMIN D3) 4,000 unit capsule take one capsule by oral route  every day with food     • clopidogrel (PLAVIX) 75 mg tablet Take 1 tablet (75 mg total) by mouth once daily. 90 tablet 1   • glyburide-metformin (GLUCOVANCE) 5-500 mg per tablet TAKE ONE TABLET BY MOUTH DAILY WITH DINNER 90 tablet 1   • lancets (freestyle) 28 gauge misc test once a day     • pantoprazole (PROTONIX) 40 mg EC tablet Take 1 tablet (40 mg total) by mouth once daily. 90 tablet 1   • pioglitazone  (ACTOS) 15 mg tablet TAKE ONE TABLET BY MOUTH EVERY DAY 90 tablet 1   • pioglitazone (ACTOS) 15 mg tablet Take 1 tablet (15 mg total) by mouth once daily. 90 tablet 1   • simvastatin (ZOCOR) 40 mg tablet Take 1 tablet (40 mg total) by mouth every evening. 90 tablet 1   • zolpidem (AMBIEN) 10 mg tablet TAKE ONE TABLET BY MOUTH AT BEDTIME AS NEEDED FOR SLEEP 30 tablet 0     No current facility-administered medications for this visit.      History reviewed. No pertinent past medical history.  Family History   Problem Relation Age of Onset   • Diabetes Biological Mother    • Breast cancer Sister    • Hypertension Sister    • Stroke Sister    • Lung cancer Brother      Past Surgical History:   Procedure Laterality Date   •  SECTION     • CORONARY STENT PLACEMENT       Allergies   Allergen Reactions   • Cephalexin Monohydrate    • Cortisone Acetate    • Morphine      Other reaction(s): Rash     Social History     Social History   • Marital status:      Spouse name: N/A   • Number of children: N/A   • Years of education: N/A     Occupational History   • Not on file.     Social History Main Topics   • Smoking status: Former Smoker   • Smokeless tobacco: Never Used   • Alcohol use No   • Drug use: Unknown   • Sexual activity: Not on file     Other Topics Concern   • Not on file     Social History Narrative   • No narrative on file     Vitals:    19 0813 19 0829   BP: 136/80 128/80   BP Location:  Left upper arm   Patient Position:  Sitting   Pulse: 74    Resp: 16    Temp: 36.7 °C (98 °F)    SpO2: 95%    Weight: 86.2 kg (190 lb)    Height: 1.524 m (5')        Objective     Physical Exam   Constitutional: She is oriented to person, place, and time. She appears well-developed and well-nourished.   Eyes: Pupils are equal, round, and reactive to light. EOM are normal.   Fundoscopic exam:       The right eye shows no AV nicking.        The left eye shows no AV nicking.   Neck: Carotid bruit is present  (left>right). No thyromegaly present.   Cardiovascular: Normal rate, regular rhythm and intact distal pulses.    Murmur heard.   Systolic murmur is present with a grade of 2/6   Pulses:       Dorsalis pedis pulses are 1+ on the right side, and 1+ on the left side.        Posterior tibial pulses are 1+ on the right side, and 1+ on the left side.   Pulmonary/Chest: Effort normal and breath sounds normal.   Abdominal: Soft. Normal appearance and bowel sounds are normal. There is no hepatosplenomegaly. There is no tenderness. There is no CVA tenderness.   Musculoskeletal:   Chronic venous insuff changes LE left > right    No open wounds    Feet:   Right Foot:   Protective Sensation: 10 sites tested. 10 sites sensed.   Left Foot:   Protective Sensation: 10 sites tested. 10 sites sensed.   Lymphadenopathy:     She has no cervical adenopathy.     She has no axillary adenopathy.   Neurological: She is alert and oriented to person, place, and time. No sensory deficit.   Skin: Skin is warm, dry and intact. No rash noted.   Psychiatric: She has a normal mood and affect. Her speech is normal and behavior is normal. Cognition and memory are normal.       Assessment/Plan   Problem List Items Addressed This Visit     Type 2 diabetes mellitus without complication, without long-term current use of insulin (CMS/[facility]) - Primary     Good diabetic control.  Current medications reviewed.  Recommend diet modifications and weight loss.   Met with patient to review diet, exercise, and glucometer readings  Reviewed medications and the importance of compliance  Diabetes health maintenance plan and follow up was discussed and understood by patient         Relevant Medications    glyburide-metformin (GLUCOVANCE) 5-500 mg per tablet    pioglitazone (ACTOS) 15 mg tablet    Essential hypertension     Hypertension well controlled  Recommend regular exercise and low salt diet  Risk and potential complications of hypertension discussed  Importance of  medication compliance and regular follow up emphasized  Role of medication/diet/exercise in treating hypertension discussed  Treatment plan and follow up reviewed and understood by patient         Relevant Medications    amLODIPine (NORVASC) 10 mg tablet    Gastroesophageal reflux disease without esophagitis     Patient was counselled regarding triggers for symptoms - avoid caffeine/spicey foods/NO smoking  Elevate HOB 30 degrees - DO NOT lie flat for at least 30 minutes after eating  Take medications as directed         Relevant Medications    pantoprazole (PROTONIX) 40 mg EC tablet    Mixed hyperlipidemia     GOOD hyperlipidemia control  CONTINUE CURRENT THERAPY  Recommend low fat diet - Risks and potential complications of hyperlipidemia reviewed  Role of medications,diet, exercise in the treatment of hyperlipidemia discussed   Treatment plan and follow up reviewed and understood by patient         Relevant Medications    simvastatin (ZOCOR) 40 mg tablet    PVD (peripheral vascular disease) (CMS/[facility])     Continue plavix  Check recent vascular recommendations         Sleep disorder     same meds - long term safety meds discussed         Age-related osteoporosis without current pathological fracture     Continue prolia - serial dexa scan for effecticveness         Relevant Medications    denosumab (PROLIA) syringe 60 mg (Completed)    Other Relevant Orders    DEXA BONE DENSITY    Vitamin D deficiency     Same meds - benefits vitamin D discussed  Recent labs reviewed and discussed with pt                Traci Katz,   9/29/2019

## 2019-09-26 NOTE — TELEPHONE ENCOUNTER
Medicine Refill Request  Query Rx  Last Office Visit: 9/26/2019  Next Office Visit: 3/26/2020      Current Outpatient Prescriptions:   •  amLODIPine (NORVASC) 10 mg tablet, Take 1 tablet (10 mg total) by mouth once daily., Disp: 90 tablet, Rfl: 1  •  blood sugar diagnostic (TRUETEST TEST STRIPS) strip, use for daily glucose trsting, Disp: , Rfl:   •  cholecalciferol, vitamin D3, (VITAMIN D3) 4,000 unit capsule, take one capsule by oral route  every day with food, Disp: , Rfl:   •  clopidogrel (PLAVIX) 75 mg tablet, Take 1 tablet (75 mg total) by mouth once daily., Disp: 90 tablet, Rfl: 1  •  denosumab (PROLIA) 60 mg/mL syringe, Inject 1 mL (60 mg total) under the skin once for 1 dose., Disp: 1 mL, Rfl: 0  •  glyburide-metformin (GLUCOVANCE) 5-500 mg per tablet, TAKE ONE TABLET BY MOUTH DAILY WITH DINNER, Disp: 90 tablet, Rfl: 1  •  lancets (freestyle) 28 gauge misc, test once a day, Disp: , Rfl:   •  pantoprazole (PROTONIX) 40 mg EC tablet, Take 1 tablet (40 mg total) by mouth once daily., Disp: 90 tablet, Rfl: 1  •  pioglitazone (ACTOS) 15 mg tablet, TAKE ONE TABLET BY MOUTH EVERY DAY, Disp: 90 tablet, Rfl: 1  •  pioglitazone (ACTOS) 15 mg tablet, Take 1 tablet (15 mg total) by mouth once daily., Disp: 90 tablet, Rfl: 1  •  simvastatin (ZOCOR) 40 mg tablet, Take 1 tablet (40 mg total) by mouth every evening., Disp: 90 tablet, Rfl: 1  •  zolpidem (AMBIEN) 10 mg tablet, TAKE ONE TABLET BY MOUTH AT BEDTIME AS NEEDED FOR SLEEP, Disp: 30 tablet, Rfl: 0      BP Readings from Last 3 Encounters:   09/26/19 128/80   03/25/19 132/74   02/21/19 128/70       Recent Lab results:  Lab Results   Component Value Date    CHOL 172 03/15/2019   ,   Lab Results   Component Value Date    HDL 59 03/15/2019   ,   Lab Results   Component Value Date    LDLCALC 93 03/15/2019   ,   Lab Results   Component Value Date    TRIG 108 03/15/2019        Lab Results   Component Value Date    GLUCOSE 92 03/15/2019   ,   Lab Results   Component Value  Date    HGBA1C 5.7 08/23/2019         Lab Results   Component Value Date    CREATININE 0.86 03/15/2019       Lab Results   Component Value Date    TSH 2.15 03/15/2019

## 2019-10-04 NOTE — TELEPHONE ENCOUNTER
Call pt - GREAT NEWS - bone density MUCH improved with prolia     Spine 10.7 %     Hip  5.5 %  We will continue every 6 months

## 2019-10-07 DIAGNOSIS — M81.0 AGE-RELATED OSTEOPOROSIS WITHOUT CURRENT PATHOLOGICAL FRACTURE: ICD-10-CM

## 2019-10-28 RX ORDER — ZOLPIDEM TARTRATE 10 MG/1
TABLET ORAL
Qty: 30 TABLET | Refills: 0 | Status: SHIPPED | OUTPATIENT
Start: 2019-10-28 | End: 2019-11-27 | Stop reason: SDUPTHER

## 2019-11-22 RX ORDER — ZOLPIDEM TARTRATE 10 MG/1
TABLET ORAL
Qty: 30 TABLET | Refills: 0 | OUTPATIENT
Start: 2019-11-22

## 2019-11-22 NOTE — TELEPHONE ENCOUNTER
[employer]MP[employer] website queried no abnormalities noted.  Patient is only receiving controlled substances from my office    refil NOT due until 11/27 - let pt know

## 2019-11-22 NOTE — TELEPHONE ENCOUNTER
Medicine Refill Request    Last Office Visit: 9/26/2019  Next Office Visit: 3/26/2020        Current Outpatient Medications:   •  amLODIPine (NORVASC) 10 mg tablet, Take 1 tablet (10 mg total) by mouth once daily., Disp: 90 tablet, Rfl: 1  •  blood sugar diagnostic (TRUETEST TEST STRIPS) strip, use for daily glucose trsting, Disp: , Rfl:   •  cholecalciferol, vitamin D3, (VITAMIN D3) 4,000 unit capsule, take one capsule by oral route  every day with food, Disp: , Rfl:   •  clopidogrel (PLAVIX) 75 mg tablet, Take 1 tablet (75 mg total) by mouth once daily., Disp: 90 tablet, Rfl: 1  •  glyburide-metformin (GLUCOVANCE) 5-500 mg per tablet, TAKE ONE TABLET BY MOUTH DAILY WITH DINNER, Disp: 90 tablet, Rfl: 1  •  lancets (freestyle) 28 gauge misc, test once a day, Disp: , Rfl:   •  pantoprazole (PROTONIX) 40 mg EC tablet, Take 1 tablet (40 mg total) by mouth once daily., Disp: 90 tablet, Rfl: 1  •  pioglitazone (ACTOS) 15 mg tablet, TAKE ONE TABLET BY MOUTH EVERY DAY, Disp: 90 tablet, Rfl: 1  •  pioglitazone (ACTOS) 15 mg tablet, Take 1 tablet (15 mg total) by mouth once daily., Disp: 90 tablet, Rfl: 1  •  simvastatin (ZOCOR) 40 mg tablet, Take 1 tablet (40 mg total) by mouth every evening., Disp: 90 tablet, Rfl: 1  •  zolpidem (AMBIEN) 10 mg tablet, TAKE ONE TABLET BY MOUTH AT BEDTIME AS NEEDED FOR SLEEP, Disp: 30 tablet, Rfl: 0      BP Readings from Last 3 Encounters:   09/26/19 128/80   03/25/19 132/74   02/21/19 128/70       Recent Lab results:  Lab Results   Component Value Date    CHOL 172 03/15/2019   ,   Lab Results   Component Value Date    HDL 59 03/15/2019   ,   Lab Results   Component Value Date    LDLCALC 93 03/15/2019   ,   Lab Results   Component Value Date    TRIG 108 03/15/2019        Lab Results   Component Value Date    GLUCOSE 92 03/15/2019   ,   Lab Results   Component Value Date    HGBA1C 5.7 08/23/2019         Lab Results   Component Value Date    CREATININE 0.86 03/15/2019       Lab Results    Component Value Date    TSH 2.15 03/15/2019

## 2019-11-27 RX ORDER — ZOLPIDEM TARTRATE 10 MG/1
TABLET ORAL
Qty: 30 TABLET | Refills: 0 | Status: SHIPPED | OUTPATIENT
Start: 2019-11-27 | End: 2019-12-26 | Stop reason: SDUPTHER

## 2019-11-27 NOTE — TELEPHONE ENCOUNTER
Medicine Refill Request  Query rx  Last Office Visit: 9/26/2019  Next Office Visit: 3/26/2020        Current Outpatient Medications:   •  amLODIPine (NORVASC) 10 mg tablet, Take 1 tablet (10 mg total) by mouth once daily., Disp: 90 tablet, Rfl: 1  •  blood sugar diagnostic (TRUETEST TEST STRIPS) strip, use for daily glucose trsting, Disp: , Rfl:   •  cholecalciferol, vitamin D3, (VITAMIN D3) 4,000 unit capsule, take one capsule by oral route  every day with food, Disp: , Rfl:   •  clopidogrel (PLAVIX) 75 mg tablet, Take 1 tablet (75 mg total) by mouth once daily., Disp: 90 tablet, Rfl: 1  •  glyburide-metformin (GLUCOVANCE) 5-500 mg per tablet, TAKE ONE TABLET BY MOUTH DAILY WITH DINNER, Disp: 90 tablet, Rfl: 1  •  lancets (freestyle) 28 gauge misc, test once a day, Disp: , Rfl:   •  pantoprazole (PROTONIX) 40 mg EC tablet, Take 1 tablet (40 mg total) by mouth once daily., Disp: 90 tablet, Rfl: 1  •  pioglitazone (ACTOS) 15 mg tablet, TAKE ONE TABLET BY MOUTH EVERY DAY, Disp: 90 tablet, Rfl: 1  •  pioglitazone (ACTOS) 15 mg tablet, Take 1 tablet (15 mg total) by mouth once daily., Disp: 90 tablet, Rfl: 1  •  simvastatin (ZOCOR) 40 mg tablet, Take 1 tablet (40 mg total) by mouth every evening., Disp: 90 tablet, Rfl: 1  •  zolpidem (AMBIEN) 10 mg tablet, TAKE ONE TABLET BY MOUTH AT BEDTIME AS NEEDED FOR SLEEP, Disp: 30 tablet, Rfl: 0      BP Readings from Last 3 Encounters:   09/26/19 128/80   03/25/19 132/74   02/21/19 128/70       Recent Lab results:  Lab Results   Component Value Date    CHOL 172 03/15/2019   ,   Lab Results   Component Value Date    HDL 59 03/15/2019   ,   Lab Results   Component Value Date    LDLCALC 93 03/15/2019   ,   Lab Results   Component Value Date    TRIG 108 03/15/2019        Lab Results   Component Value Date    GLUCOSE 92 03/15/2019   ,   Lab Results   Component Value Date    HGBA1C 5.7 08/23/2019         Lab Results   Component Value Date    CREATININE 0.86 03/15/2019       Lab Results    Component Value Date    TSH 2.15 03/15/2019

## 2019-12-20 RX ORDER — PANTOPRAZOLE SODIUM 40 MG/1
40 TABLET, DELAYED RELEASE ORAL
Qty: 90 TABLET | Refills: 1 | Status: SHIPPED | OUTPATIENT
Start: 2019-12-20 | End: 2020-11-16 | Stop reason: SDUPTHER

## 2019-12-23 RX ORDER — ZOLPIDEM TARTRATE 10 MG/1
TABLET ORAL
Qty: 30 TABLET | OUTPATIENT
Start: 2019-12-23

## 2019-12-23 NOTE — TELEPHONE ENCOUNTER
PMPAWARxE website queried no abnormalities noted.  Patient is only receiving controlled substances from my office    RX  DUE 12/26 - LET PT KNOW CAN FILL THEN

## 2019-12-26 RX ORDER — ZOLPIDEM TARTRATE 10 MG/1
TABLET ORAL
Qty: 30 TABLET | Refills: 0 | Status: SHIPPED | OUTPATIENT
Start: 2019-12-26 | End: 2020-01-25

## 2019-12-30 RX ORDER — GLYBURIDE-METFORMIN HYDROCHLORIDE 5; 500 MG/1; MG/1
TABLET ORAL
Qty: 90 TABLET | Refills: 1 | Status: SHIPPED | OUTPATIENT
Start: 2019-12-30 | End: 2020-10-26 | Stop reason: ALTCHOICE

## 2019-12-30 RX ORDER — PIOGLITAZONEHYDROCHLORIDE 15 MG/1
15 TABLET ORAL
Qty: 90 TABLET | Refills: 1 | Status: SHIPPED | OUTPATIENT
Start: 2019-12-30 | End: 2020-10-26

## 2019-12-30 RX ORDER — CLOPIDOGREL BISULFATE 75 MG/1
75 TABLET ORAL
Qty: 90 TABLET | Refills: 1 | Status: SHIPPED | OUTPATIENT
Start: 2019-12-30 | End: 2020-10-26 | Stop reason: ALTCHOICE

## 2019-12-30 RX ORDER — AMLODIPINE BESYLATE 10 MG/1
10 TABLET ORAL
Qty: 90 TABLET | Refills: 1 | Status: SHIPPED | OUTPATIENT
Start: 2019-12-30 | End: 2020-11-16 | Stop reason: SDUPTHER

## 2019-12-30 RX ORDER — SIMVASTATIN 40 MG/1
40 TABLET, FILM COATED ORAL EVERY EVENING
Qty: 90 TABLET | Refills: 1 | Status: SHIPPED | OUTPATIENT
Start: 2019-12-30 | End: 2020-10-26 | Stop reason: ALTCHOICE

## 2019-12-30 NOTE — TELEPHONE ENCOUNTER
Patient needs all her medications refilled and sent to Medicine [facility].      Ambien  Actos  Amlodipine  Metformin  Protonix  Plavix  Simvistatin      Medicine Refill Request    Last Office Visit: 9/26/2019  Next Office Visit: 3/26/2020        Current Outpatient Medications:   •  amLODIPine (NORVASC) 10 mg tablet, Take 1 tablet (10 mg total) by mouth once daily., Disp: 90 tablet, Rfl: 1  •  blood sugar diagnostic (TRUETEST TEST STRIPS) strip, use for daily glucose trsting, Disp: , Rfl:   •  cholecalciferol, vitamin D3, (VITAMIN D3) 4,000 unit capsule, take one capsule by oral route  every day with food, Disp: , Rfl:   •  clopidogrel (PLAVIX) 75 mg tablet, Take 1 tablet (75 mg total) by mouth once daily., Disp: 90 tablet, Rfl: 1  •  glyburide-metformin (GLUCOVANCE) 5-500 mg per tablet, TAKE ONE TABLET BY MOUTH DAILY WITH DINNER, Disp: 90 tablet, Rfl: 1  •  lancets (freestyle) 28 gauge misc, test once a day, Disp: , Rfl:   •  pantoprazole (PROTONIX) 40 mg EC tablet, Take 1 tablet (40 mg total) by mouth once daily., Disp: 90 tablet, Rfl: 1  •  pioglitazone (ACTOS) 15 mg tablet, TAKE ONE TABLET BY MOUTH EVERY DAY, Disp: 90 tablet, Rfl: 1  •  pioglitazone (ACTOS) 15 mg tablet, Take 1 tablet (15 mg total) by mouth once daily., Disp: 90 tablet, Rfl: 1  •  simvastatin (ZOCOR) 40 mg tablet, Take 1 tablet (40 mg total) by mouth every evening., Disp: 90 tablet, Rfl: 1  •  zolpidem (AMBIEN) 10 mg tablet, TAKE ONE TABLET BY MOUTH AT BEDTIME AS NEEDED FOR SLEEP, Disp: 30 tablet, Rfl: 0      BP Readings from Last 3 Encounters:   09/26/19 128/80   03/25/19 132/74   02/21/19 128/70       Recent Lab results:  Lab Results   Component Value Date    CHOL 172 03/15/2019   ,   Lab Results   Component Value Date    HDL 59 03/15/2019   ,   Lab Results   Component Value Date    LDLCALC 93 03/15/2019   ,   Lab Results   Component Value Date    TRIG 108 03/15/2019        Lab Results   Component Value Date    GLUCOSE 92 03/15/2019   ,    Lab Results   Component Value Date    HGBA1C 5.7 08/23/2019         Lab Results   Component Value Date    CREATININE 0.86 03/15/2019       Lab Results   Component Value Date    TSH 2.15 03/15/2019

## 2019-12-30 NOTE — TELEPHONE ENCOUNTER
Medicine Refill Request    Last Office Visit: 9/26/2019  Next Office Visit: 3/26/2020    ambien filled 12/26/19 and pantoprazole filled 12/20/19    Current Outpatient Medications:   •  amLODIPine (NORVASC) 10 mg tablet, Take 1 tablet (10 mg total) by mouth once daily., Disp: 90 tablet, Rfl: 1  •  blood sugar diagnostic (TRUETEST TEST STRIPS) strip, use for daily glucose trsting, Disp: , Rfl:   •  cholecalciferol, vitamin D3, (VITAMIN D3) 4,000 unit capsule, take one capsule by oral route  every day with food, Disp: , Rfl:   •  clopidogrel (PLAVIX) 75 mg tablet, Take 1 tablet (75 mg total) by mouth once daily., Disp: 90 tablet, Rfl: 1  •  glyburide-metformin (GLUCOVANCE) 5-500 mg per tablet, TAKE ONE TABLET BY MOUTH DAILY WITH DINNER, Disp: 90 tablet, Rfl: 1  •  lancets (freestyle) 28 gauge misc, test once a day, Disp: , Rfl:   •  pantoprazole (PROTONIX) 40 mg EC tablet, Take 1 tablet (40 mg total) by mouth once daily., Disp: 90 tablet, Rfl: 1  •  pioglitazone (ACTOS) 15 mg tablet, TAKE ONE TABLET BY MOUTH EVERY DAY, Disp: 90 tablet, Rfl: 1  •  pioglitazone (ACTOS) 15 mg tablet, Take 1 tablet (15 mg total) by mouth once daily., Disp: 90 tablet, Rfl: 1  •  simvastatin (ZOCOR) 40 mg tablet, Take 1 tablet (40 mg total) by mouth every evening., Disp: 90 tablet, Rfl: 1  •  zolpidem (AMBIEN) 10 mg tablet, TAKE ONE TABLET BY MOUTH AT BEDTIME AS NEEDED FOR SLEEP, Disp: 30 tablet, Rfl: 0      BP Readings from Last 3 Encounters:   09/26/19 128/80   03/25/19 132/74   02/21/19 128/70       Recent Lab results:  Lab Results   Component Value Date    CHOL 172 03/15/2019   ,   Lab Results   Component Value Date    HDL 59 03/15/2019   ,   Lab Results   Component Value Date    LDLCALC 93 03/15/2019   ,   Lab Results   Component Value Date    TRIG 108 03/15/2019        Lab Results   Component Value Date    GLUCOSE 92 03/15/2019   ,   Lab Results   Component Value Date    HGBA1C 5.7 08/23/2019         Lab Results   Component Value Date     CREATININE 0.86 03/15/2019       Lab Results   Component Value Date    TSH 2.15 03/15/2019

## 2020-01-21 RX ORDER — ZOLPIDEM TARTRATE 10 MG/1
TABLET ORAL
Qty: 30 TABLET | Refills: 0 | OUTPATIENT
Start: 2020-01-21

## 2020-01-21 NOTE — TELEPHONE ENCOUNTER
PMPAWARxE website queried no abnormalities noted.  Patient is only receiving controlled substances from my office    REFILLED 12/26/19    TOO EARLY - LET PT KNOW   QUE FOR 1/24

## 2020-01-23 ENCOUNTER — OFFICE VISIT (OUTPATIENT)
Dept: FAMILY MEDICINE | Facility: CLINIC | Age: 85
End: 2020-01-23
Payer: MEDICARE

## 2020-01-23 VITALS
OXYGEN SATURATION: 98 % | SYSTOLIC BLOOD PRESSURE: 118 MMHG | BODY MASS INDEX: 39.23 KG/M2 | WEIGHT: 199.8 LBS | DIASTOLIC BLOOD PRESSURE: 78 MMHG | TEMPERATURE: 97.9 F | RESPIRATION RATE: 18 BRPM | HEART RATE: 68 BPM | HEIGHT: 60 IN

## 2020-01-23 DIAGNOSIS — M25.512 ACUTE PAIN OF LEFT SHOULDER: Primary | ICD-10-CM

## 2020-01-23 PROCEDURE — 99213 OFFICE O/P EST LOW 20 MIN: CPT | Performed by: NURSE PRACTITIONER

## 2020-01-23 ASSESSMENT — ENCOUNTER SYMPTOMS
CHEST TIGHTNESS: 0
FATIGUE: 0
SPEECH DIFFICULTY: 0
APPETITE CHANGE: 0
COUGH: 0
ARTHRALGIAS: 1
NUMBNESS: 0
FEVER: 0
PALPITATIONS: 0
WEAKNESS: 0
UNEXPECTED WEIGHT CHANGE: 0
CONFUSION: 0
SHORTNESS OF BREATH: 0
HEADACHES: 0
DIFFICULTY URINATING: 0
ACTIVITY CHANGE: 0
ABDOMINAL PAIN: 0

## 2020-01-23 NOTE — PATIENT INSTRUCTIONS
Please do not do any strenuous activities. You can do gentle stretching/walking as tolerated. Please use heat or ice (whichever gives you the most relief) for 15 minutes at a time up to 4x/day. Please use topical creams (such as Bengay or IcyHot) as directed. Please use Tylenol 1,000 mg every 8 hours as needed for pain (max 4,000 mg/day).  Lidocaine patch brand: Salonpas.    Please call if not better in about 2-3 weeks, or if pain worsens at any time.

## 2020-01-23 NOTE — PROGRESS NOTES
"Subjective      Patient ID: Thelma Gomes is a 84 y.o. female.  1935      Pt reports left shoulder pain for 2 weeks.  Started after patient used both arms to push herself up in bed.  Is left handed, so is having trouble with ADL's like putting her bra on and pulling pants up.   Reports that pain has been steady over the past 2 weeks.  Worsened with certain movements.  Has tried ES Tylenol, which makes the pain \"bearable\".    Denies numbness/tingling and weakness of left arm.      The following have been reviewed and updated as appropriate in this visit:  Tobacco  Allergies  Meds  Problems  Med Hx  Surg Hx  Fam Hx  Soc Hx        Review of Systems   Constitutional: Negative for activity change, appetite change, fatigue, fever and unexpected weight change.   Eyes: Negative for visual disturbance.   Respiratory: Negative for cough, chest tightness and shortness of breath.    Cardiovascular: Negative for chest pain, palpitations and leg swelling.   Gastrointestinal: Negative for abdominal pain.   Genitourinary: Negative for difficulty urinating.   Musculoskeletal: Positive for arthralgias (left shoulder).   Skin: Negative for rash.   Neurological: Negative for speech difficulty, weakness, numbness and headaches.   Psychiatric/Behavioral: Negative for confusion.       Objective     Vitals:    01/23/20 1531   BP: 118/78   BP Location: Right upper arm   Patient Position: Sitting   Pulse: (!) 50   Resp: 18   Temp: 36.6 °C (97.9 °F)   TempSrc: Temporal   SpO2: 98%   Weight: 90.6 kg (199 lb 12.8 oz)   Height: 1.524 m (5')     Body mass index is 39.02 kg/m².    Physical Exam   Constitutional: She is oriented to person, place, and time. She appears well-developed and well-nourished. No distress.   With dtg   HENT:   Head: Normocephalic and atraumatic.   Eyes: Conjunctivae are normal. Right eye exhibits no discharge. Left eye exhibits no discharge. No scleral icterus.   Neck: Full passive range of motion without pain. "   Cardiovascular: Normal rate, regular rhythm and normal heart sounds.   Pulses:       Radial pulses are 2+ on the left side.   Pulmonary/Chest: Effort normal and breath sounds normal.   Musculoskeletal: She exhibits no edema or deformity.        Left shoulder: She exhibits decreased range of motion and tenderness (of left deltoid area). She exhibits no crepitus, normal pulse and normal strength.   Unable to perform vertical flexion/extension or abduction/adduction of shoulder.  Severe loss of ROM.   Neurological: She is alert and oriented to person, place, and time. No cranial nerve deficit. Coordination normal.   Skin: Skin is warm and dry. No rash noted.   Psychiatric: She has a normal mood and affect. Her behavior is normal. Judgment and thought content normal.   Nursing note and vitals reviewed.      Assessment/Plan   Diagnoses and all orders for this visit:    Acute pain of left shoulder (Primary)  -     MRI SHOULDER LEFT WITHOUT CONTRAST; Future  Will order MRI d/t duration of pain and exam findings.  Pt is comfortable with using ES Tylenol for pain control.  Advised adding heat and topical ointments.  Pt and pt's daughter VU.    Follow up PRN.

## 2020-01-25 RX ORDER — ZOLPIDEM TARTRATE 10 MG/1
TABLET ORAL
Qty: 30 TABLET | Refills: 0 | Status: SHIPPED | OUTPATIENT
Start: 2020-01-25 | End: 2020-02-27 | Stop reason: SDUPTHER

## 2020-01-31 ENCOUNTER — TELEPHONE (OUTPATIENT)
Dept: FAMILY MEDICINE | Facility: CLINIC | Age: 85
End: 2020-01-31

## 2020-01-31 NOTE — TELEPHONE ENCOUNTER
Pt notified of MRI results and VU. Pt provided with contact info for [facility], she will arrange appt with dtgr (dtgr will drive her to appt). Pt to call office if she has any trouble scheduling. Pt states pt is controlled with Tylenol, does not wish to take anything stronger.

## 2020-01-31 NOTE — TELEPHONE ENCOUNTER
Please let pt know MRI showed that multiple tendons in her shoulder are worn.  One tendon has a partial tear.  She needs to see ortho ASAP.  Does she need names/numbers of ortho?  Is her pain still manageable with Tylenol?  THx

## 2020-02-03 DIAGNOSIS — M25.512 ACUTE PAIN OF LEFT SHOULDER: ICD-10-CM

## 2020-02-03 RX ORDER — ZOLPIDEM TARTRATE 10 MG/1
TABLET ORAL
Qty: 30 TABLET | Refills: 0 | OUTPATIENT
Start: 2020-02-03

## 2020-02-03 NOTE — TELEPHONE ENCOUNTER
PMPAWARxE website queried no abnormalities noted.  Patient is only receiving controlled substances from my office    rx just filled 1/25/20 - too early    Let pt know / make sure SHE picked up RX

## 2020-02-16 RX ORDER — ZOLPIDEM TARTRATE 10 MG/1
TABLET ORAL
Qty: 30 TABLET | OUTPATIENT
Start: 2020-02-16

## 2020-02-16 NOTE — TELEPHONE ENCOUNTER
PMPAWARxE website queried no abnormalities noted.  Patient is only receiving controlled substances from my office    Last refil 1/25 zolpidem - refil too soon  Let pt tiffanie

## 2020-02-18 ENCOUNTER — TELEPHONE (OUTPATIENT)
Dept: FAMILY MEDICINE | Facility: CLINIC | Age: 85
End: 2020-02-18

## 2020-02-18 DIAGNOSIS — E55.9 VITAMIN D DEFICIENCY: ICD-10-CM

## 2020-02-18 DIAGNOSIS — E11.9 TYPE 2 DIABETES MELLITUS WITHOUT COMPLICATION, WITHOUT LONG-TERM CURRENT USE OF INSULIN (CMS/HCC): ICD-10-CM

## 2020-02-18 DIAGNOSIS — E78.2 MIXED HYPERLIPIDEMIA: ICD-10-CM

## 2020-02-18 DIAGNOSIS — M81.0 AGE-RELATED OSTEOPOROSIS WITHOUT CURRENT PATHOLOGICAL FRACTURE: Primary | ICD-10-CM

## 2020-02-19 ENCOUNTER — TELEPHONE (OUTPATIENT)
Dept: FAMILY MEDICINE | Facility: CLINIC | Age: 85
End: 2020-02-19

## 2020-02-19 DIAGNOSIS — M25.512 ACUTE PAIN OF LEFT SHOULDER: Primary | ICD-10-CM

## 2020-02-19 NOTE — TELEPHONE ENCOUNTER
Patient is requesting to see a different orthopedic provider, closer to home. She needs and order for the need for ortho eval and treat.  Please advise.

## 2020-02-20 RX ORDER — ZOLPIDEM TARTRATE 10 MG/1
10 TABLET ORAL NIGHTLY PRN
Qty: 30 TABLET | Refills: 0 | OUTPATIENT
Start: 2020-02-20

## 2020-02-20 NOTE — TELEPHONE ENCOUNTER
PMPAWARxE website queried no abnormalities noted.  Patient is only receiving controlled substances from my office    Last filled 1/25/20  Too early - can fill 48 hours early ONLY  Let pt know

## 2020-02-20 NOTE — TELEPHONE ENCOUNTER
Medicine Refill Request  Query Rx  Pharm asking for new Rx for delivery program  Last Office Visit: 1/23/2020  Next Office Visit: 3/26/2020        Current Outpatient Medications:   •  amLODIPine (NORVASC) 10 mg tablet, Take 1 tablet (10 mg total) by mouth once daily., Disp: 90 tablet, Rfl: 1  •  blood sugar diagnostic (TRUETEST TEST STRIPS) strip, use for daily glucose trsting, Disp: , Rfl:   •  cholecalciferol, vitamin D3, (VITAMIN D3) 4,000 unit capsule, take one capsule by oral route  every day with food, Disp: , Rfl:   •  clopidogrel (PLAVIX) 75 mg tablet, Take 1 tablet (75 mg total) by mouth once daily., Disp: 90 tablet, Rfl: 1  •  glyburide-metformin (GLUCOVANCE) 5-500 mg per tablet, TAKE ONE TABLET BY MOUTH DAILY WITH DINNER, Disp: 90 tablet, Rfl: 1  •  lancets (freestyle) 28 gauge misc, test once a day, Disp: , Rfl:   •  pantoprazole (PROTONIX) 40 mg EC tablet, Take 1 tablet (40 mg total) by mouth once daily., Disp: 90 tablet, Rfl: 1  •  pioglitazone (ACTOS) 15 mg tablet, TAKE ONE TABLET BY MOUTH EVERY DAY, Disp: 90 tablet, Rfl: 1  •  pioglitazone (ACTOS) 15 mg tablet, Take 1 tablet (15 mg total) by mouth once daily., Disp: 90 tablet, Rfl: 1  •  simvastatin (ZOCOR) 40 mg tablet, Take 1 tablet (40 mg total) by mouth every evening., Disp: 90 tablet, Rfl: 1  •  zolpidem (AMBIEN) 10 mg tablet, TAKE ONE TABLET BY MOUTH AT BEDTIME AS NEEDED FOR SLEEP, Disp: 30 tablet, Rfl: 0      BP Readings from Last 3 Encounters:   01/23/20 118/78   09/26/19 128/80   03/25/19 132/74       Recent Lab results:  Lab Results   Component Value Date    CHOL 172 03/15/2019   ,   Lab Results   Component Value Date    HDL 59 03/15/2019   ,   Lab Results   Component Value Date    LDLCALC 93 03/15/2019   ,   Lab Results   Component Value Date    TRIG 108 03/15/2019        Lab Results   Component Value Date    GLUCOSE 92 03/15/2019   ,   Lab Results   Component Value Date    HGBA1C 5.7 08/23/2019         Lab Results   Component Value Date     CREATININE 0.86 03/15/2019       Lab Results   Component Value Date    TSH 2.15 03/15/2019

## 2020-02-27 RX ORDER — ZOLPIDEM TARTRATE 10 MG/1
10 TABLET ORAL NIGHTLY PRN
Qty: 30 TABLET | Refills: 0 | Status: SHIPPED | OUTPATIENT
Start: 2020-02-27 | End: 2020-03-24 | Stop reason: SDUPTHER

## 2020-02-27 NOTE — TELEPHONE ENCOUNTER
Medicine Refill Request  Query rx  Last Office Visit: 1/23/2020  Next Office Visit: 3/26/2020        Current Outpatient Medications:   •  amLODIPine (NORVASC) 10 mg tablet, Take 1 tablet (10 mg total) by mouth once daily., Disp: 90 tablet, Rfl: 1  •  blood sugar diagnostic (TRUETEST TEST STRIPS) strip, use for daily glucose trsting, Disp: , Rfl:   •  cholecalciferol, vitamin D3, (VITAMIN D3) 4,000 unit capsule, take one capsule by oral route  every day with food, Disp: , Rfl:   •  clopidogrel (PLAVIX) 75 mg tablet, Take 1 tablet (75 mg total) by mouth once daily., Disp: 90 tablet, Rfl: 1  •  glyburide-metformin (GLUCOVANCE) 5-500 mg per tablet, TAKE ONE TABLET BY MOUTH DAILY WITH DINNER, Disp: 90 tablet, Rfl: 1  •  lancets (freestyle) 28 gauge misc, test once a day, Disp: , Rfl:   •  pantoprazole (PROTONIX) 40 mg EC tablet, Take 1 tablet (40 mg total) by mouth once daily., Disp: 90 tablet, Rfl: 1  •  pioglitazone (ACTOS) 15 mg tablet, TAKE ONE TABLET BY MOUTH EVERY DAY, Disp: 90 tablet, Rfl: 1  •  pioglitazone (ACTOS) 15 mg tablet, Take 1 tablet (15 mg total) by mouth once daily., Disp: 90 tablet, Rfl: 1  •  simvastatin (ZOCOR) 40 mg tablet, Take 1 tablet (40 mg total) by mouth every evening., Disp: 90 tablet, Rfl: 1  •  zolpidem (AMBIEN) 10 mg tablet, TAKE ONE TABLET BY MOUTH AT BEDTIME AS NEEDED FOR SLEEP, Disp: 30 tablet, Rfl: 0      BP Readings from Last 3 Encounters:   01/23/20 118/78   09/26/19 128/80   03/25/19 132/74       Recent Lab results:  Lab Results   Component Value Date    CHOL 172 03/15/2019   ,   Lab Results   Component Value Date    HDL 59 03/15/2019   ,   Lab Results   Component Value Date    LDLCALC 93 03/15/2019   ,   Lab Results   Component Value Date    TRIG 108 03/15/2019        Lab Results   Component Value Date    GLUCOSE 92 03/15/2019   ,   Lab Results   Component Value Date    HGBA1C 5.7 08/23/2019         Lab Results   Component Value Date    CREATININE 0.86 03/15/2019       Lab Results    Component Value Date    TSH 2.15 03/15/2019

## 2020-03-04 RX ORDER — BLOOD-GLUCOSE METER
KIT MISCELLANEOUS
Qty: 1 EACH | Refills: 0 | Status: SHIPPED | OUTPATIENT
Start: 2020-03-04 | End: 2021-01-06 | Stop reason: SDUPTHER

## 2020-03-04 RX ORDER — LANCETS 28 GAUGE
EACH MISCELLANEOUS
Qty: 100 EACH | Refills: 1 | Status: SHIPPED | OUTPATIENT
Start: 2020-03-04 | End: 2021-01-06 | Stop reason: SDUPTHER

## 2020-03-04 RX ORDER — BLOOD-GLUCOSE METER
KIT MISCELLANEOUS
Qty: 100 STRIP | Refills: 1 | Status: SHIPPED | OUTPATIENT
Start: 2020-03-04 | End: 2020-06-08

## 2020-03-04 NOTE — TELEPHONE ENCOUNTER
Medicine Refill Request    Last Office Visit: 1/23/2020  Next Office Visit: 3/26/2020        Current Outpatient Medications:   •  amLODIPine (NORVASC) 10 mg tablet, Take 1 tablet (10 mg total) by mouth once daily., Disp: 90 tablet, Rfl: 1  •  blood sugar diagnostic (TRUETEST TEST STRIPS) strip, use for daily glucose trsting, Disp: , Rfl:   •  cholecalciferol, vitamin D3, (VITAMIN D3) 4,000 unit capsule, take one capsule by oral route  every day with food, Disp: , Rfl:   •  clopidogrel (PLAVIX) 75 mg tablet, Take 1 tablet (75 mg total) by mouth once daily., Disp: 90 tablet, Rfl: 1  •  glyburide-metformin (GLUCOVANCE) 5-500 mg per tablet, TAKE ONE TABLET BY MOUTH DAILY WITH DINNER, Disp: 90 tablet, Rfl: 1  •  lancets (freestyle) 28 gauge misc, test once a day, Disp: , Rfl:   •  pantoprazole (PROTONIX) 40 mg EC tablet, Take 1 tablet (40 mg total) by mouth once daily., Disp: 90 tablet, Rfl: 1  •  pioglitazone (ACTOS) 15 mg tablet, TAKE ONE TABLET BY MOUTH EVERY DAY, Disp: 90 tablet, Rfl: 1  •  pioglitazone (ACTOS) 15 mg tablet, Take 1 tablet (15 mg total) by mouth once daily., Disp: 90 tablet, Rfl: 1  •  simvastatin (ZOCOR) 40 mg tablet, Take 1 tablet (40 mg total) by mouth every evening., Disp: 90 tablet, Rfl: 1  •  zolpidem (AMBIEN) 10 mg tablet, Take 1 tablet (10 mg total) by mouth nightly as needed for sleep. for sleep, Disp: 30 tablet, Rfl: 0      BP Readings from Last 3 Encounters:   01/23/20 118/78   09/26/19 128/80   03/25/19 132/74       Recent Lab results:  Lab Results   Component Value Date    CHOL 172 03/15/2019   ,   Lab Results   Component Value Date    HDL 59 03/15/2019   ,   Lab Results   Component Value Date    LDLCALC 93 03/15/2019   ,   Lab Results   Component Value Date    TRIG 108 03/15/2019        Lab Results   Component Value Date    GLUCOSE 92 03/15/2019   ,   Lab Results   Component Value Date    HGBA1C 5.7 08/23/2019         Lab Results   Component Value Date    CREATININE 0.86 03/15/2019        Lab Results   Component Value Date    TSH 2.15 03/15/2019

## 2020-03-19 RX ORDER — ZOLPIDEM TARTRATE 10 MG/1
TABLET ORAL
Qty: 30 TABLET | Refills: 0 | OUTPATIENT
Start: 2020-03-19

## 2020-03-19 NOTE — TELEPHONE ENCOUNTER
PMPAWARxE website queried no abnormalities noted.  Patient is only receiving controlled substances from my office    RX not due until 3/27

## 2020-03-24 RX ORDER — ZOLPIDEM TARTRATE 10 MG/1
10 TABLET ORAL NIGHTLY PRN
Qty: 30 TABLET | Refills: 0 | Status: SHIPPED | OUTPATIENT
Start: 2020-03-24 | End: 2020-10-26 | Stop reason: ALTCHOICE

## 2020-03-24 NOTE — TELEPHONE ENCOUNTER
Medicine Refill Request  Query Rx  Would like to  tomorrow  Last Office Visit: 1/23/2020  Next Office Visit: Visit date not found        Current Outpatient Medications:   •  amLODIPine (NORVASC) 10 mg tablet, Take 1 tablet (10 mg total) by mouth once daily., Disp: 90 tablet, Rfl: 1  •  cholecalciferol, vitamin D3, (VITAMIN D3) 4,000 unit capsule, take one capsule by oral route  every day with food, Disp: , Rfl:   •  clopidogrel (PLAVIX) 75 mg tablet, Take 1 tablet (75 mg total) by mouth once daily., Disp: 90 tablet, Rfl: 1  •  FREESTYLE 28 gauge lancets, Test sugar once daily. Dx: E11.9, Disp: 100 each, Rfl: 1  •  FREESTYLE LITE METER kit, Test sugar once daily. Dx: E11.9, Disp: 1 each, Rfl: 0  •  FREESTYLE LITE STRIPS strip, Test sugar once daily. Dx: E11.9, Disp: 100 strip, Rfl: 1  •  glyburide-metformin (GLUCOVANCE) 5-500 mg per tablet, TAKE ONE TABLET BY MOUTH DAILY WITH DINNER, Disp: 90 tablet, Rfl: 1  •  pantoprazole (PROTONIX) 40 mg EC tablet, Take 1 tablet (40 mg total) by mouth once daily., Disp: 90 tablet, Rfl: 1  •  pioglitazone (ACTOS) 15 mg tablet, TAKE ONE TABLET BY MOUTH EVERY DAY, Disp: 90 tablet, Rfl: 1  •  pioglitazone (ACTOS) 15 mg tablet, Take 1 tablet (15 mg total) by mouth once daily., Disp: 90 tablet, Rfl: 1  •  simvastatin (ZOCOR) 40 mg tablet, Take 1 tablet (40 mg total) by mouth every evening., Disp: 90 tablet, Rfl: 1  •  zolpidem (AMBIEN) 10 mg tablet, Take 1 tablet (10 mg total) by mouth nightly as needed for sleep. for sleep, Disp: 30 tablet, Rfl: 0      BP Readings from Last 3 Encounters:   01/23/20 118/78   09/26/19 128/80   03/25/19 132/74       Recent Lab results:  Lab Results   Component Value Date    CHOL 172 03/15/2019   ,   Lab Results   Component Value Date    HDL 59 03/15/2019   ,   Lab Results   Component Value Date    LDLCALC 93 03/15/2019   ,   Lab Results   Component Value Date    TRIG 108 03/15/2019        Lab Results   Component Value Date    GLUCOSE 92 03/15/2019    ,   Lab Results   Component Value Date    HGBA1C 5.7 08/23/2019         Lab Results   Component Value Date    CREATININE 0.86 03/15/2019       Lab Results   Component Value Date    TSH 2.15 03/15/2019

## 2020-06-08 RX ORDER — CALCIUM CITRATE/VITAMIN D3 200MG-6.25
TABLET ORAL
Qty: 100 STRIP | Refills: 1 | Status: SHIPPED | OUTPATIENT
Start: 2020-06-08 | End: 2021-01-06

## 2020-06-08 NOTE — TELEPHONE ENCOUNTER
Medicine Refill Request    Last Office Visit: 1/23/2020  Last Telemedicine Visit: Visit date not found    Next Office Visit: Visit date not found  Next Telemedicine Visit: Visit date not found         Current Outpatient Medications:   •  amLODIPine (NORVASC) 10 mg tablet, Take 1 tablet (10 mg total) by mouth once daily., Disp: 90 tablet, Rfl: 1  •  cholecalciferol, vitamin D3, (VITAMIN D3) 4,000 unit capsule, take one capsule by oral route  every day with food, Disp: , Rfl:   •  clopidogrel (PLAVIX) 75 mg tablet, Take 1 tablet (75 mg total) by mouth once daily., Disp: 90 tablet, Rfl: 1  •  FREESTYLE 28 gauge lancets, Test sugar once daily. Dx: E11.9, Disp: 100 each, Rfl: 1  •  FREESTYLE LITE METER kit, Test sugar once daily. Dx: E11.9, Disp: 1 each, Rfl: 0  •  FREESTYLE LITE STRIPS strip, Test sugar once daily. Dx: E11.9, Disp: 100 strip, Rfl: 1  •  glyburide-metformin (GLUCOVANCE) 5-500 mg per tablet, TAKE ONE TABLET BY MOUTH DAILY WITH DINNER, Disp: 90 tablet, Rfl: 1  •  pantoprazole (PROTONIX) 40 mg EC tablet, Take 1 tablet (40 mg total) by mouth once daily., Disp: 90 tablet, Rfl: 1  •  pioglitazone (ACTOS) 15 mg tablet, TAKE ONE TABLET BY MOUTH EVERY DAY, Disp: 90 tablet, Rfl: 1  •  pioglitazone (ACTOS) 15 mg tablet, Take 1 tablet (15 mg total) by mouth once daily., Disp: 90 tablet, Rfl: 1  •  simvastatin (ZOCOR) 40 mg tablet, Take 1 tablet (40 mg total) by mouth every evening., Disp: 90 tablet, Rfl: 1  •  zolpidem (AMBIEN) 10 mg tablet, Take 1 tablet (10 mg total) by mouth nightly as needed for sleep. for sleep, Disp: 30 tablet, Rfl: 0      BP Readings from Last 3 Encounters:   01/23/20 118/78   09/26/19 128/80   03/25/19 132/74       Recent Lab results:  Lab Results   Component Value Date    CHOL 172 03/15/2019   ,   Lab Results   Component Value Date    HDL 59 03/15/2019   ,   Lab Results   Component Value Date    LDLCALC 93 03/15/2019   ,   Lab Results   Component Value Date    TRIG 108 03/15/2019        Lab  Results   Component Value Date    GLUCOSE 92 03/15/2019   ,   Lab Results   Component Value Date    HGBA1C 5.7 08/23/2019         Lab Results   Component Value Date    CREATININE 0.86 03/15/2019       Lab Results   Component Value Date    TSH 2.15 03/15/2019

## 2020-10-26 RX ORDER — TRAMADOL HYDROCHLORIDE 50 MG/1
100 TABLET ORAL 3 TIMES DAILY
COMMUNITY
End: 2020-10-26 | Stop reason: SDUPTHER

## 2020-10-26 RX ORDER — GABAPENTIN 100 MG/1
100 CAPSULE ORAL
COMMUNITY
End: 2020-11-16 | Stop reason: SDUPTHER

## 2020-10-26 RX ORDER — METFORMIN HYDROCHLORIDE 500 MG/1
500 TABLET, EXTENDED RELEASE ORAL 2 TIMES DAILY
COMMUNITY
End: 2020-11-16 | Stop reason: SDUPTHER

## 2020-10-26 RX ORDER — TAMSULOSIN HYDROCHLORIDE 0.4 MG/1
0.4 CAPSULE ORAL DAILY
COMMUNITY
End: 2020-11-16 | Stop reason: SDUPTHER

## 2020-10-26 RX ORDER — LOSARTAN POTASSIUM 100 MG/1
100 TABLET ORAL DAILY
COMMUNITY
End: 2020-11-09 | Stop reason: SDUPTHER

## 2020-10-26 RX ORDER — ATORVASTATIN CALCIUM 40 MG/1
40 TABLET, FILM COATED ORAL DAILY
COMMUNITY
End: 2020-11-16 | Stop reason: SDUPTHER

## 2020-10-26 RX ORDER — POTASSIUM CHLORIDE 750 MG/1
10 TABLET, FILM COATED, EXTENDED RELEASE ORAL DAILY
COMMUNITY
End: 2020-11-16 | Stop reason: SDUPTHER

## 2020-10-26 RX ORDER — COLCHICINE 0.6 MG/1
0.6 TABLET ORAL DAILY
COMMUNITY
End: 2020-11-16 | Stop reason: SDUPTHER

## 2020-10-26 RX ORDER — TRAMADOL HYDROCHLORIDE 50 MG/1
100 TABLET ORAL 3 TIMES DAILY
Qty: 84 TABLET | Refills: 0 | Status: SHIPPED | OUTPATIENT
Start: 2020-10-26 | End: 2020-11-09

## 2020-10-26 RX ORDER — FUROSEMIDE 40 MG/1
40 TABLET ORAL DAILY
COMMUNITY
End: 2020-11-16 | Stop reason: SDUPTHER

## 2020-10-26 NOTE — TELEPHONE ENCOUNTER
Pt daughter called. Pt just discharged from [facility] and all meds have changed. Pt scheduled for appointment 11/2/20. But needs [facility]ill prior [facility] qued 14 days supply until OV    Medicine Refill Request    Last Office Visit: 1/23/2020  Last Telemedicine Visit: Visit date not found    Next Office Visit: 11/2/2020  Next Telemedicine Visit: Visit date not found         Current Outpatient Medications:   •  atorvastatin (LIPITOR) 40 mg tablet, Take 40 mg by mouth daily., Disp: , Rfl:   •  colchicine (COLCRYS) 0.6 mg tablet, Take 0.6 mg by mouth daily., Disp: , Rfl:   •  furosemide (LASIX) 40 mg tablet, Take 40 mg by mouth daily., Disp: , Rfl:   •  gabapentin (NEURONTIN) 100 mg capsule, Take 100 mg by mouth. Take 2 capsules in am, 3 capsules hs, Disp: , Rfl:   •  losartan (COZAAR) 100 mg tablet, Take 100 mg by mouth daily., Disp: , Rfl:   •  metFORMIN XR (GLUCOPHAGE-XR) 500 mg 24 hr tablet, Take 500 mg by mouth 2 (two) times a day., Disp: , Rfl:   •  potassium chloride (KLOR-CON) 10 mEq CR tablet, Take 10 mEq by mouth once daily., Disp: , Rfl:   •  tamsulosin (FLOMAX) 0.4 mg capsule, Take 0.4 mg by mouth once daily., Disp: , Rfl:   •  traMADoL (ULTRAM) 50 mg tablet, Take 100 mg by mouth 3 (three) times a day., Disp: , Rfl:   •  amLODIPine (NORVASC) 10 mg tablet, Take 1 tablet (10 mg total) by mouth once daily., Disp: 90 tablet, Rfl: 1  •  cholecalciferol, vitamin D3, (VITAMIN D3) 4,000 unit capsule, take one capsule by oral route  every day with food, Disp: , Rfl:   •  FREESTYLE 28 gauge lancets, Test sugar once daily. Dx: E11.9, Disp: 100 each, Rfl: 1  •  FREESTYLE LITE METER kit, Test sugar once daily. Dx: E11.9, Disp: 1 each, Rfl: 0  •  pantoprazole (PROTONIX) 40 mg EC tablet, Take 1 tablet (40 mg total) by mouth once daily., Disp: 90 tablet, Rfl: 1  •  TRUE METRIX GLUCOSE TEST STRIP strip, TEST BLOOD SUGAR ONCE DAILY, Disp: 100 strip, Rfl: 1      BP Readings from Last 3  Encounters:   01/23/20 118/78   09/26/19 128/80   03/25/19 132/74       Recent Lab results:  Lab Results   Component Value Date    CHOL 172 03/15/2019   ,   Lab Results   Component Value Date    HDL 59 03/15/2019   ,   Lab Results   Component Value Date    LDLCALC 93 03/15/2019   ,   Lab Results   Component Value Date    TRIG 108 03/15/2019        Lab Results   Component Value Date    GLUCOSE 92 03/15/2019   ,   Lab Results   Component Value Date    HGBA1C 5.7 08/23/2019         Lab Results   Component Value Date    CREATININE 0.86 03/15/2019       Lab Results   Component Value Date    TSH 2.15 03/15/2019

## 2020-10-27 ENCOUNTER — TELEPHONE (OUTPATIENT)
Dept: FAMILY MEDICINE | Facility: CLINIC | Age: 85
End: 2020-10-27

## 2020-10-27 ENCOUNTER — PATIENT OUTREACH (OUTPATIENT)
Dept: CASE MANAGEMENT | Facility: CLINIC | Age: 85
End: 2020-10-27

## 2020-10-27 PROCEDURE — G0180 MD CERTIFICATION HHA PATIENT: HCPCS | Performed by: FAMILY MEDICINE

## 2020-10-27 RX ORDER — ASPIRIN 81 MG/1
81 TABLET ORAL DAILY
COMMUNITY

## 2020-10-27 NOTE — TELEPHONE ENCOUNTER
"I called Isis (daughter) she is very overwhelmed with moms care. PT and RN from [facility] is to come out today to assess. I let her know they will assess her needs and order what care is needed. Let her know PT will assess her strength and teach transfer techniques and set expectations of care.     Daughter is having [facility] send records to us so we are updated on what has happened in last few months.    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    Pt with b/l leg pain that \"shoots\" into top and side of legs intermittently. She is currently taking Gabapentin and tramadol for this (I updated MAR yesterday per daughter reporting)    Due to pt wheelchair bound pt appointment is next week because of transportation.      "

## 2020-10-27 NOTE — TELEPHONE ENCOUNTER
Patient is taking Tramadol for leg pain.  It is not helping.  She is up all night with the pain,  It is a sharp, stabbing pain and she cries with it.      Please advise.  She does have a follow-up on 11/2.    She does have [facility] coming is and Isis was advised to ask them for instructions on care, transferring from bed to chair etc.

## 2020-10-27 NOTE — PROGRESS NOTES
NAME: Thelma Gomes    MRN: [id number]    YOB: 1935    EVENT DETAILS    Discharging Facility: ([facility])  Date of Admission: 05/12/20  Date of Discharge: 10/26/20  Discharge Instructions Reviewed?: Yes     [facility]: 3/28-4/2/20 Weakness, CVA  [facility]: 4/2-5/12/20 CHF, Left SELWYN CVA.     Reason for Admission:  Diastolic Heart Failure      HPI: Spoke with pt's daughter Isis. Pt admitted s/p fall going to the bathroom, weakness and confusion. Daughter said they didn't test her for a stroke until the next day when she finally was able to speak to a physician. CT head negative, MRI-left SELWYN within left frontal lobe. Pt also with c/o right knee pain and left arm pain. X-rays negative fractures. During hospitalization and rehab pt treated for CHF, aspiration pneumonia, 2 episodes of pseudogout and 2 UTI's (Ecoli, pseudomonas).     Pt is not doing physically well at home. She is unable to get up without a lot of assistance.  Denies chest pain, shortness of breath, lightheadedness, dizziness, palpitations, nausea, vomiting, diarrhea, constipation, abdominal bloating, lower extremity edema, etc. Pain in her legs has been unbearable. Tramadol doesn't help and daughter concerned about the high dose of Tramadol. Pain is worse at night. Son in-law heard pt crying last night due to pain.     Family has hired private duty to be with pt during the day while daughter works. Daughter leaves a little after 5 am and comes home around 3 pm.    Pt has a  through Medicaid. They are setting up pt with wheelchair van for her appt with PCP. Pt also being set up for [employer].     MEDICATION REVIEW:    Reported by:: Family Member  Prescriptions Filled?: Yes     Was a medication discrepancy indentified?: No     Medication understanding?: Yes     Medication Adherence?: Yes     Any side effects from medication?: No       Medication review Reviewed, see medication  "history    Additional Comments: Daughter concerned about the dose of Tramadol and possible cause for some of pt's \"confusion\". Plavix not on discharge from [place].       FOLLOW-UP TESTS/PROCEDURES:    PCP: 11/2  Neurology: needs to schedule    HOME MANAGEMENT    Living Arrangement: Children(Pt lives with her daughter and son in-law with first floor set up. Pt needs assistance with ADL's. Pt has a walker for ambulation.)  Home Monitoring: Weight    HOME CARE SERVICES    Receiving Home Care Services: Yes  Type of Home Care Services: Home Nursing, Home PT, Home Health Aide  Home Care Agency: [facility]        DURABLE MEDICAL EQUIPMENT    Durable Medical Equipment: Walker, Cane, Bedside commode(Lift chair)  Oxygen Use: No            BARRIERS TO CARE    SELF-CARE    Living Arrangement: Children(Pt lives with her daughter and son in-law with first floor set up. Pt needs assistance with ADL's. Pt has a walker for ambulation.)       SOCIOECONOMIC    Financial Problems?: No     Transportation Issues?: No            INTERVENTION/CARE COORDINATION    Interventions/ Care Coordination: Encouraged patient to schedule with the PCP/Specialist, Addressed a knowledge deficit    PLAN OF CARE:    Reviewed signs/symptoms of worsening condition or complication that necessitate a call to the Physician's office.  Educated patient on access to care.  RN phone number given for future care management needs.       Sandrita Gee RN  [phone]    "

## 2020-10-29 ENCOUNTER — TELEPHONE (OUTPATIENT)
Dept: FAMILY MEDICINE | Facility: CLINIC | Age: 85
End: 2020-10-29

## 2020-10-29 RX ORDER — NYSTATIN 100000 [USP'U]/G
POWDER TOPICAL 2 TIMES DAILY
Qty: 30 G | Refills: 1 | Status: SHIPPED | OUTPATIENT
Start: 2020-10-29 | End: 2020-11-28

## 2020-10-29 NOTE — TELEPHONE ENCOUNTER
Pt daughter requesting refill nystatin powder for rashes pt has from [facility] . [facility]

## 2020-10-29 NOTE — TELEPHONE ENCOUNTER
Pt was already in for 10 min OV 11/2/20 at 3:30. Transportation was already made since pt wheelchair bound. TCM nurse wanted to make sure adding additional 20 min to that time slot was okay and did not mess up your schedule too much. Please let me know if this needs to be changed. I did already call daughter to try and place in a dedicated TCM spot but daughter states it is hard to arrange transportation so wanted me to check if current date and time possible. If not, I will try an reschedule

## 2020-10-30 ENCOUNTER — TELEPHONE (OUTPATIENT)
Dept: FAMILY MEDICINE | Facility: CLINIC | Age: 85
End: 2020-10-30

## 2020-11-02 RX ORDER — SILVER SULFADIAZINE 10 G/1000G
CREAM TOPICAL
COMMUNITY
Start: 2020-10-22 | End: 2020-11-02 | Stop reason: SDUPTHER

## 2020-11-02 RX ORDER — SILVER SULFADIAZINE 10 G/1000G
CREAM TOPICAL DAILY
Qty: 50 G | Refills: 0 | Status: SHIPPED | OUTPATIENT
Start: 2020-11-02 | End: 2020-11-12

## 2020-11-02 NOTE — TELEPHONE ENCOUNTER
Medicine Refill Request    Last Office Visit: 1/23/2020  Last Telemedicine Visit: Visit date not found    Next Office Visit: 11/9/2020  Next Telemedicine Visit: Visit date not found         Current Outpatient Medications:   •  amLODIPine (NORVASC) 10 mg tablet, Take 1 tablet (10 mg total) by mouth once daily., Disp: 90 tablet, Rfl: 1  •  aspirin 81 mg enteric coated tablet, Take 81 mg by mouth daily., Disp: , Rfl:   •  atorvastatin (LIPITOR) 40 mg tablet, Take 40 mg by mouth daily., Disp: , Rfl:   •  cholecalciferol, vitamin D3, (VITAMIN D3) 100 mcg (4,000 unit) capsule,  , Disp: , Rfl:   •  colchicine (COLCRYS) 0.6 mg tablet, Take 0.6 mg by mouth daily., Disp: , Rfl:   •  FREESTYLE 28 gauge lancets, Test sugar once daily. Dx: E11.9, Disp: 100 each, Rfl: 1  •  FREESTYLE LITE METER kit, Test sugar once daily. Dx: E11.9, Disp: 1 each, Rfl: 0  •  furosemide (LASIX) 40 mg tablet, Take 40 mg by mouth daily., Disp: , Rfl:   •  gabapentin (NEURONTIN) 100 mg capsule, Take 100 mg by mouth. Take 2 capsules in am, 3 capsules hs, Disp: , Rfl:   •  losartan (COZAAR) 100 mg tablet, Take 100 mg by mouth daily., Disp: , Rfl:   •  metFORMIN XR (GLUCOPHAGE-XR) 500 mg 24 hr tablet, Take 500 mg by mouth 2 (two) times a day., Disp: , Rfl:   •  nystatin (MYCOSTATIN) 100,000 unit/gram powder, Apply topically 2 (two) times a day., Disp: 30 g, Rfl: 1  •  pantoprazole (PROTONIX) 40 mg EC tablet, Take 1 tablet (40 mg total) by mouth once daily., Disp: 90 tablet, Rfl: 1  •  potassium chloride (KLOR-CON) 10 mEq CR tablet, Take 10 mEq by mouth once daily., Disp: , Rfl:   •  silver sulfadiazine (SILVADENE) 1 % cream, , Disp: , Rfl:   •  tamsulosin (FLOMAX) 0.4 mg capsule, Take 0.4 mg by mouth once daily., Disp: , Rfl:   •  traMADoL (ULTRAM) 50 mg tablet, Take 2 tablets (100 mg total) by mouth 3 (three) times a day for 14 days., Disp: 84 tablet, Rfl: 0  •  TRUE METRIX GLUCOSE TEST STRIP strip, TEST BLOOD SUGAR ONCE DAILY, Disp: 100 strip, Rfl:  1      BP Readings from Last 3 Encounters:   01/23/20 118/78   09/26/19 128/80   03/25/19 132/74       Recent Lab results:  Lab Results   Component Value Date    CHOL 172 03/15/2019   ,   Lab Results   Component Value Date    HDL 59 03/15/2019   ,   Lab Results   Component Value Date    LDLCALC 93 03/15/2019   ,   Lab Results   Component Value Date    TRIG 108 03/15/2019        Lab Results   Component Value Date    GLUCOSE 92 03/15/2019   ,   Lab Results   Component Value Date    HGBA1C 5.7 08/23/2019         Lab Results   Component Value Date    CREATININE 0.86 03/15/2019       Lab Results   Component Value Date    TSH 2.15 03/15/2019

## 2020-11-03 ENCOUNTER — PATIENT OUTREACH (OUTPATIENT)
Dept: CASE MANAGEMENT | Facility: CLINIC | Age: 85
End: 2020-11-03

## 2020-11-03 NOTE — PROGRESS NOTES
TCM f/u CHF. Spoke with pt's daughter Isis. Pt is doing ok. Slow going. Pt is having a hard time standing due to knee pain. Daughter says it sounds like she has rice krispies in her knee. PT will show pt some exercises.     Daughter couldn't get transportation to yesterdays appt through [employer]. New appt for 11/9.     Daughter denies patient having chest pain, shortness of breath, lower extremity edema, denies urinary symptoms, etc.     Daughter unable to weigh pt at home. Denies s/s of CHF. Pt following low sodium diabetic diet. Daughter is monitoring diet.     Will f/u 2 weeks.    Sandrita Gee, RN  [phone]

## 2020-11-05 ENCOUNTER — TELEPHONE (OUTPATIENT)
Dept: FAMILY MEDICINE | Facility: CLINIC | Age: 85
End: 2020-11-05

## 2020-11-09 ENCOUNTER — OFFICE VISIT (OUTPATIENT)
Dept: FAMILY MEDICINE | Facility: CLINIC | Age: 85
End: 2020-11-09
Payer: MEDICARE

## 2020-11-09 VITALS
TEMPERATURE: 98.1 F | HEART RATE: 70 BPM | SYSTOLIC BLOOD PRESSURE: 127 MMHG | DIASTOLIC BLOOD PRESSURE: 70 MMHG | HEIGHT: 60 IN | WEIGHT: 201 LBS | BODY MASS INDEX: 39.46 KG/M2 | OXYGEN SATURATION: 98 %

## 2020-11-09 DIAGNOSIS — I50.22 CHRONIC SYSTOLIC CONGESTIVE HEART FAILURE (CMS/HCC): ICD-10-CM

## 2020-11-09 DIAGNOSIS — L24.0 CONTACT DERMATITIS AND ECZEMA DUE TO DETERGENTS: ICD-10-CM

## 2020-11-09 DIAGNOSIS — M25.552 JOINT PAIN OF LEFT HIP ON MOVEMENT: ICD-10-CM

## 2020-11-09 DIAGNOSIS — I63.522 CEREBROVASCULAR ACCIDENT (CVA) DUE TO OCCLUSION OF LEFT ANTERIOR CEREBRAL ARTERY (CMS/HCC): Primary | ICD-10-CM

## 2020-11-09 DIAGNOSIS — E78.2 MIXED HYPERLIPIDEMIA: ICD-10-CM

## 2020-11-09 DIAGNOSIS — E11.9 TYPE 2 DIABETES MELLITUS WITHOUT COMPLICATION, WITHOUT LONG-TERM CURRENT USE OF INSULIN (CMS/HCC): ICD-10-CM

## 2020-11-09 DIAGNOSIS — I10 ESSENTIAL HYPERTENSION: ICD-10-CM

## 2020-11-09 DIAGNOSIS — I69.351 HEMIPLEGIA AND HEMIPARESIS FOLLOWING CEREBRAL INFARCTION AFFECTING RIGHT DOMINANT SIDE (CMS/HCC): ICD-10-CM

## 2020-11-09 DIAGNOSIS — G47.9 SLEEP DISORDER: ICD-10-CM

## 2020-11-09 DIAGNOSIS — I73.9 PVD (PERIPHERAL VASCULAR DISEASE) (CMS/HCC): ICD-10-CM

## 2020-11-09 DIAGNOSIS — H10.13 ALLERGIC CONJUNCTIVITIS OF BOTH EYES: ICD-10-CM

## 2020-11-09 PROCEDURE — 99215 OFFICE O/P EST HI 40 MIN: CPT | Performed by: FAMILY MEDICINE

## 2020-11-09 RX ORDER — OLOPATADINE HYDROCHLORIDE 2 MG/ML
1 SOLUTION/ DROPS OPHTHALMIC DAILY
Qty: 5 ML | Refills: 0 | Status: SHIPPED | OUTPATIENT
Start: 2020-11-09 | End: 2021-04-05 | Stop reason: SDUPTHER

## 2020-11-09 RX ORDER — LOSARTAN POTASSIUM 100 MG/1
100 TABLET ORAL DAILY
Qty: 30 TABLET | Refills: 1 | Status: SHIPPED | OUTPATIENT
Start: 2020-11-09 | End: 2020-12-06

## 2020-11-09 NOTE — PROGRESS NOTES
Subjective      Patient ID: Thelma Gomes is a 85 y.o. female.    HPI    Hospitalized [place] tran with CVA   acute rehab [facility] then [facility] x 6 months  Wheelchair bound except with transfer      Home PT / VNA / OT  Concerns : left groin pain - severe since discharged 10/27                             Diffuse rash                     Eye allergies    ALL AVAILABLE RECORDS [facility] AND [facility] REVIEWED AND DISCUSSED WITH PT AND DAUGHTER    The following have been reviewed and updated as appropriate in this visit:       Review of Systems   Constitutional: Positive for fatigue. Negative for chills and unexpected weight change.   Eyes: Positive for discharge and itching. Negative for visual disturbance.   Respiratory: Negative for cough and shortness of breath.    Cardiovascular: Negative for chest pain, palpitations and leg swelling.   Gastrointestinal: Negative for abdominal pain, blood in stool, constipation, diarrhea and nausea.   Endocrine: Negative for cold intolerance and heat intolerance.   Genitourinary: Negative for dysuria, frequency, hematuria and urgency.   Musculoskeletal: Positive for arthralgias and gait problem.   Skin: Positive for rash. Negative for color change.   Neurological: Negative for dizziness, numbness and headaches.   Hematological: Negative for adenopathy. Does not bruise/bleed easily.   Psychiatric/Behavioral: Positive for dysphoric mood and sleep disturbance.       Current Outpatient Medications   Medication Sig Dispense Refill   • amLODIPine (NORVASC) 10 mg tablet Take 1 tablet (10 mg total) by mouth once daily. 90 tablet 1   • aspirin 81 mg enteric coated tablet Take 81 mg by mouth daily.     • atorvastatin (LIPITOR) 40 mg tablet Take 40 mg by mouth daily.     • cholecalciferol, vitamin D3, (VITAMIN D3) 100 mcg (4,000 unit) capsule       • colchicine (COLCRYS) 0.6 mg tablet Take 0.6 mg by mouth daily.     • FREESTYLE 28 gauge lancets Test sugar once daily. Dx:  E11.9 100 each 1   • FREESTYLE LITE METER kit Test sugar once daily. Dx: E11.9 1 each 0   • furosemide (LASIX) 40 mg tablet Take 40 mg by mouth daily.     • gabapentin (NEURONTIN) 100 mg capsule Take 100 mg by mouth. Take 2 capsules in am, 3 capsules hs     • losartan (COZAAR) 100 mg tablet Take 1 tablet (100 mg total) by mouth daily. 30 tablet 1   • metFORMIN XR (GLUCOPHAGE-XR) 500 mg 24 hr tablet Take 500 mg by mouth 2 (two) times a day.     • nystatin (MYCOSTATIN) 100,000 unit/gram powder Apply topically 2 (two) times a day. 30 g 1   • pantoprazole (PROTONIX) 40 mg EC tablet Take 1 tablet (40 mg total) by mouth once daily. 90 tablet 1   • potassium chloride (KLOR-CON) 10 mEq CR tablet Take 10 mEq by mouth once daily.     • tamsulosin (FLOMAX) 0.4 mg capsule Take 0.4 mg by mouth once daily.     • TRUE METRIX GLUCOSE TEST STRIP strip TEST BLOOD SUGAR ONCE DAILY 100 strip 1   • olopatadine (PATADAY) 0.2 % ophthalmic solution Administer 1 drop into both eyes daily. 5 mL 0     No current facility-administered medications for this visit.      No past medical history on file.  Family History   Problem Relation Age of Onset   • Diabetes Biological Mother    • Breast cancer Biological Sister    • Hypertension Biological Sister    • Stroke Biological Sister    • Lung cancer Biological Brother      Past Surgical History:   Procedure Laterality Date   •  SECTION     • CORONARY STENT PLACEMENT       Allergies   Allergen Reactions   • Cephalexin Monohydrate    • Cortisone Acetate    • Morphine      Other reaction(s): Rash     Social History     Socioeconomic History   • Marital status:      Spouse name: Not on file   • Number of children: Not on file   • Years of education: Not on file   • Highest education level: Not on file   Occupational History   • Not on file   Social Needs   • Financial resource strain: Not on file   • Food insecurity     Worry: Not on file     Inability: Not on file   • Transportation  needs     Medical: Not on file     Non-medical: Not on file   Tobacco Use   • Smoking status: Former Smoker   • Smokeless tobacco: Never Used   Substance and Sexual Activity   • Alcohol use: No   • Drug use: Not on file   • Sexual activity: Defer   Lifestyle   • Physical activity     Days per week: Not on file     Minutes per session: Not on file   • Stress: Not on file   Relationships   • Social connections     Talks on phone: Not on file     Gets together: Not on file     Attends Faith service: Not on file     Active member of club or organization: Not on file     Attends meetings of clubs or organizations: Not on file     Relationship status: Not on file   • Intimate partner violence     Fear of current or ex partner: Not on file     Emotionally abused: Not on file     Physically abused: Not on file     Forced sexual activity: Not on file   Other Topics Concern   • Not on file   Social History Narrative   • Not on file     Vitals:    11/09/20 1037 11/09/20 1112   BP: 116/78 127/70   BP Location: Left upper arm    Patient Position: Sitting    Pulse: 70    Temp: 36.7 °C (98.1 °F)    TempSrc: Temporal    SpO2: 98%    Weight: 91.2 kg (201 lb)    Height: 1.524 m (5')        Objective     Physical Exam  Constitutional:       Appearance: Normal appearance. She is well-developed. She is ill-appearing.   Eyes:      General:         Right eye: Discharge present.         Left eye: Discharge present.     Conjunctiva/sclera:      Right eye: Right conjunctiva is injected.      Pupils: Pupils are equal, round, and reactive to light.      Funduscopic exam:     Right eye: No AV nicking.         Left eye: No AV nicking.   Neck:      Thyroid: No thyromegaly.      Vascular: Carotid bruit (L>R) present.   Cardiovascular:      Rate and Rhythm: Normal rate and regular rhythm.      Pulses: Normal pulses.           Dorsalis pedis pulses are 2+ on the right side and 2+ on the left side.        Posterior tibial pulses are 2+ on the  right side and 2+ on the left side.      Heart sounds: Murmur present. Systolic murmur present with a grade of 2/6.   Pulmonary:      Effort: Pulmonary effort is normal.      Breath sounds: Normal breath sounds.   Abdominal:      General: Bowel sounds are normal.      Palpations: Abdomen is soft. There is no hepatomegaly or splenomegaly.      Tenderness: There is no abdominal tenderness. There is no right CVA tenderness or left CVA tenderness.   Musculoskeletal:      Left hip: She exhibits decreased range of motion.      Right lower leg: No edema.      Left lower leg: No edema.      Comments: PAIN WITH ROM  PAIN ON DIRECT PALPATION LEFT PUBIC RIM   Skin:     General: Skin is warm and dry.      Findings: Rash (DRY PATCHY) present.   Neurological:      Mental Status: She is alert and oriented to person, place, and time.      Sensory: No sensory deficit.   Psychiatric:         Mood and Affect: Mood is anxious and depressed.         Speech: Speech normal.         Behavior: Behavior normal.         Assessment/Plan   Problem List Items Addressed This Visit        Nervous    Hemiplegia and hemiparesis following cerebral infarction affecting right dominant side (CMS/HCC)     PT/OT continued            Circulatory    Chronic systolic congestive heart failure (CMS/HCC)     Compensated - close follow sx         Relevant Medications    losartan (COZAAR) 100 mg tablet    Essential hypertension     Hypertension well controlled  Recommend regular exercise and low salt diet  Risk and potential complications of hypertension discussed  Importance of medication compliance and regular follow up emphasized  Role of medication/diet/exercise in treating hypertension discussed  Treatment plan and follow up reviewed and understood by patient         Relevant Medications    losartan (COZAAR) 100 mg tablet    Other Relevant Orders    CBC and Differential    Comprehensive metabolic panel    PVD (peripheral vascular disease) (CMS/HCC)      Antiplatelet RX            Endocrine/Metabolic    Type 2 diabetes mellitus without complication, without long-term current use of insulin (CMS/HCC)     UNKNOWN diabetic control.  Current medications reviewed.  Recommend diet modifications and weight loss.   Met with patient to review diet, exercise, and glucometer readings  Reviewed medications and the importance of compliance  Diabetes health maintenance plan and follow up was discussed and understood by patient         Relevant Orders    Hemoglobin A1c    Mixed hyperlipidemia     UNKNOWN hyperlipidemia control  CONTINUE CURRENT THERAPY  Recommend low fat diet - Risks and potential complications of hyperlipidemia reviewed  Role of medications,diet, exercise in the treatment of hyperlipidemia discussed   Treatment plan and follow up reviewed and understood by patient         Relevant Orders    TSH 3rd Generation       Other    Sleep disorder     Chronic but worsened with HS pain    Pain mgmt pending xray result           Other Visit Diagnoses     Cerebrovascular accident (CVA) due to occlusion of left anterior cerebral artery (CMS/HCC)    -  Primary    Joint pain of left hip on movement        counseled am concerned re occult pelvic or hip fracture - check xray    Relevant Orders    X-RAY HIP WITH OR WITHOUT PELVIS 4+ VW LEFT    Allergic conjunctivitis of both eyes        daily drops as ordered    Relevant Medications    olopatadine (PATADAY) 0.2 % ophthalmic solution    Contact dermatitis and eczema due to detergents        sarna lotion          Traci Hoffman-DO [place]  11/12/2020

## 2020-11-09 NOTE — PATIENT INSTRUCTIONS
NADER SOUTH  KNEE BRACE FOR STABILITY    HOME XRAY LEFT HIP ASAP  HOME LAB DRAW ASAP     MANY ISSUES NOT ADDRESSED - CLOSE FOLLOW UP

## 2020-11-10 ENCOUNTER — TELEPHONE (OUTPATIENT)
Dept: FAMILY MEDICINE | Facility: CLINIC | Age: 85
End: 2020-11-10

## 2020-11-10 NOTE — TELEPHONE ENCOUNTER
Daughter aware [facility] care sent lab draw order and [facility] x-ray contacted for home x-ray (case#[id number])

## 2020-11-12 ENCOUNTER — TELEPHONE (OUTPATIENT)
Dept: FAMILY MEDICINE | Facility: CLINIC | Age: 85
End: 2020-11-12

## 2020-11-12 PROBLEM — I69.351 HEMIPLEGIA AND HEMIPARESIS FOLLOWING CEREBRAL INFARCTION AFFECTING RIGHT DOMINANT SIDE (CMS/HCC): Status: ACTIVE | Noted: 2020-06-11

## 2020-11-12 PROBLEM — R76.11 ABNORMAL RESULT OF MANTOUX TEST: Status: ACTIVE | Noted: 2020-11-12

## 2020-11-12 ASSESSMENT — ENCOUNTER SYMPTOMS
ADENOPATHY: 0
HEMATURIA: 0
EYE DISCHARGE: 1
BLOOD IN STOOL: 0
FREQUENCY: 0
EYE ITCHING: 1
NAUSEA: 0
CHILLS: 0
ARTHRALGIAS: 1
DIARRHEA: 0
ABDOMINAL PAIN: 0
BRUISES/BLEEDS EASILY: 0
SLEEP DISTURBANCE: 1
DIZZINESS: 0
PALPITATIONS: 0
DYSPHORIC MOOD: 1
FATIGUE: 1
NUMBNESS: 0
HEADACHES: 0
SHORTNESS OF BREATH: 0
UNEXPECTED WEIGHT CHANGE: 0
COUGH: 0
CONSTIPATION: 0
DYSURIA: 0
COLOR CHANGE: 0

## 2020-11-12 NOTE — TELEPHONE ENCOUNTER
[facility] called and was asking for results of Thelma's left hip. She said they are concerned because it was done Tuesday.

## 2020-11-12 NOTE — ASSESSMENT & PLAN NOTE
UNKNOWN diabetic control.  Current medications reviewed.  Recommend diet modifications and weight loss.   Met with patient to review diet, exercise, and glucometer readings  Reviewed medications and the importance of compliance  Diabetes health maintenance plan and follow up was discussed and understood by patient

## 2020-11-12 NOTE — ASSESSMENT & PLAN NOTE
UNKNOWN hyperlipidemia control  CONTINUE CURRENT THERAPY  Recommend low fat diet - Risks and potential complications of hyperlipidemia reviewed  Role of medications,diet, exercise in the treatment of hyperlipidemia discussed   Treatment plan and follow up reviewed and understood by patient

## 2020-11-12 NOTE — TELEPHONE ENCOUNTER
CALL DAUGHTER   XRAY DOES NOT SHOW FRACTURE HIP OR PELVIS   LIKELY PAIN DUE TO LONG STANDING INACTIVITY  NEED TO DISCUSS WITH PT RE ADDITIONAL TREATMENT    ASK HER TO HAVE PHYSICAL THERAPIST CALL RE PT  SAME PAIN MEDS AT PRESENT

## 2020-11-12 NOTE — TELEPHONE ENCOUNTER
Daughter called and made aware this am, noted in chart  Just Called [facility] and made her aware as well

## 2020-11-16 ENCOUNTER — TELEPHONE (OUTPATIENT)
Dept: FAMILY MEDICINE | Facility: CLINIC | Age: 85
End: 2020-11-16

## 2020-11-16 NOTE — TELEPHONE ENCOUNTER
Pt daughter called saying she needs refills on most of her medication.  Flomax, Lasix, colchicine, Protonix, Klor-Con, Norvasc, Gabapentin, Lipitor, Metformin XR  [facility] [place]    Medicine Refill Request    Last Office Visit: 11/9/2020  Last Telemedicine Visit: Visit date not found    Next Office Visit: Visit date not found  Next Telemedicine Visit: Visit date not found         Current Outpatient Medications:   •  amLODIPine (NORVASC) 10 mg tablet, Take 1 tablet (10 mg total) by mouth once daily., Disp: 90 tablet, Rfl: 1  •  aspirin 81 mg enteric coated tablet, Take 81 mg by mouth daily., Disp: , Rfl:   •  atorvastatin (LIPITOR) 40 mg tablet, Take 40 mg by mouth daily., Disp: , Rfl:   •  cholecalciferol, vitamin D3, (VITAMIN D3) 100 mcg (4,000 unit) capsule,  , Disp: , Rfl:   •  colchicine (COLCRYS) 0.6 mg tablet, Take 0.6 mg by mouth daily., Disp: , Rfl:   •  FREESTYLE 28 gauge lancets, Test sugar once daily. Dx: E11.9, Disp: 100 each, Rfl: 1  •  FREESTYLE LITE METER kit, Test sugar once daily. Dx: E11.9, Disp: 1 each, Rfl: 0  •  furosemide (LASIX) 40 mg tablet, Take 40 mg by mouth daily., Disp: , Rfl:   •  gabapentin (NEURONTIN) 100 mg capsule, Take 100 mg by mouth. Take 2 capsules in am, 3 capsules hs, Disp: , Rfl:   •  losartan (COZAAR) 100 mg tablet, Take 1 tablet (100 mg total) by mouth daily., Disp: 30 tablet, Rfl: 1  •  metFORMIN XR (GLUCOPHAGE-XR) 500 mg 24 hr tablet, Take 500 mg by mouth 2 (two) times a day., Disp: , Rfl:   •  nystatin (MYCOSTATIN) 100,000 unit/gram powder, Apply topically 2 (two) times a day., Disp: 30 g, Rfl: 1  •  olopatadine (PATADAY) 0.2 % ophthalmic solution, Administer 1 drop into both eyes daily., Disp: 5 mL, Rfl: 0  •  pantoprazole (PROTONIX) 40 mg EC tablet, Take 1 tablet (40 mg total) by mouth once daily., Disp: 90 tablet, Rfl: 1  •  potassium chloride (KLOR-CON) 10 mEq CR tablet, Take 10 mEq by mouth once daily., Disp: , Rfl:   •  tamsulosin (FLOMAX) 0.4 mg  capsule, Take 0.4 mg by mouth once daily., Disp: , Rfl:   •  TRUE METRIX GLUCOSE TEST STRIP strip, TEST BLOOD SUGAR ONCE DAILY, Disp: 100 strip, Rfl: 1      BP Readings from Last 3 Encounters:   11/09/20 127/70   01/23/20 118/78   09/26/19 128/80       Recent Lab results:  Lab Results   Component Value Date    CHOL 172 03/15/2019   ,   Lab Results   Component Value Date    HDL 59 03/15/2019   ,   Lab Results   Component Value Date    LDLCALC 93 03/15/2019   ,   Lab Results   Component Value Date    TRIG 108 03/15/2019        Lab Results   Component Value Date    GLUCOSE 92 03/15/2019   ,   Lab Results   Component Value Date    HGBA1C 5.7 08/23/2019         Lab Results   Component Value Date    CREATININE 0.86 03/15/2019       Lab Results   Component Value Date    TSH 2.15 03/15/2019

## 2020-11-16 NOTE — TELEPHONE ENCOUNTER
Daughter Jamila Graham  [phone]    [facility] has not come out to draw labs.      [facility] has an old prescription for some of these.  Daughter not clear which ones she is out of.  Would like a review so she can track properly    Meds requested:    Amlodipine 10 mg  Atorvastatin 40 mg  Colchicine .6 mg  Gabapentin 100 mg  Metformin 500 mg  Pantoprazole 40 mg  Potassium chloride 10 mEq CR  Tamulosin .4 mg  Fosamax 40 mg

## 2020-11-17 RX ORDER — METFORMIN HYDROCHLORIDE 500 MG/1
500 TABLET, EXTENDED RELEASE ORAL 2 TIMES DAILY
Qty: 180 TABLET | Refills: 1 | Status: SHIPPED | OUTPATIENT
Start: 2020-11-17 | End: 2021-04-05 | Stop reason: SDUPTHER

## 2020-11-17 RX ORDER — FUROSEMIDE 40 MG/1
40 TABLET ORAL DAILY
Qty: 90 TABLET | Refills: 1 | Status: SHIPPED | OUTPATIENT
Start: 2020-11-17 | End: 2021-04-05 | Stop reason: SDUPTHER

## 2020-11-17 RX ORDER — COLCHICINE 0.6 MG/1
0.6 TABLET ORAL DAILY
Qty: 90 TABLET | Refills: 0 | Status: SHIPPED | OUTPATIENT
Start: 2020-11-17 | End: 2021-02-04

## 2020-11-17 RX ORDER — PANTOPRAZOLE SODIUM 40 MG/1
40 TABLET, DELAYED RELEASE ORAL
Qty: 90 TABLET | Refills: 1 | Status: SHIPPED | OUTPATIENT
Start: 2020-11-17 | End: 2021-04-05 | Stop reason: SDUPTHER

## 2020-11-17 RX ORDER — AMLODIPINE BESYLATE 10 MG/1
10 TABLET ORAL
Qty: 90 TABLET | Refills: 1 | Status: SHIPPED | OUTPATIENT
Start: 2020-11-17 | End: 2021-04-26

## 2020-11-17 RX ORDER — TAMSULOSIN HYDROCHLORIDE 0.4 MG/1
0.4 CAPSULE ORAL DAILY
Qty: 90 CAPSULE | Refills: 1 | Status: SHIPPED | OUTPATIENT
Start: 2020-11-17 | End: 2021-04-05 | Stop reason: SDUPTHER

## 2020-11-17 RX ORDER — ATORVASTATIN CALCIUM 40 MG/1
40 TABLET, FILM COATED ORAL DAILY
Qty: 90 TABLET | Refills: 1 | Status: SHIPPED | OUTPATIENT
Start: 2020-11-17 | End: 2021-04-05 | Stop reason: SDUPTHER

## 2020-11-17 RX ORDER — GABAPENTIN 100 MG/1
CAPSULE ORAL
Qty: 150 CAPSULE | Refills: 1 | Status: SHIPPED | OUTPATIENT
Start: 2020-11-17 | End: 2021-01-09

## 2020-11-17 RX ORDER — POTASSIUM CHLORIDE 750 MG/1
10 TABLET, EXTENDED RELEASE ORAL DAILY
Qty: 90 TABLET | Refills: 1 | Status: SHIPPED | OUTPATIENT
Start: 2020-11-17 | End: 2021-04-05 | Stop reason: SDUPTHER

## 2020-11-18 NOTE — TELEPHONE ENCOUNTER
I had called on Monday 11/16 and spoke with [facility]. They were going to contact the daughter with exact day of draw. Orders were faxed over on 11/10

## 2020-11-19 ENCOUNTER — PATIENT OUTREACH (OUTPATIENT)
Dept: CASE MANAGEMENT | Facility: CLINIC | Age: 85
End: 2020-11-19

## 2020-11-19 NOTE — PROGRESS NOTES
TCM f/u CHF.Spoke with pt's daughter Isis. Pt is doing ok. VN sent pt to the ED with infection in her leg. Per ED-bilateral lower extremity cellulitis. Per daughter this is not new. Discharged on Clindamycin.     Wound center: 11/20-daughter is hoping they will put a plan of care in place and something for the VN to follow.     PT saw pt today and very pleased. Pt was able to walk from living room to the kitchen sink using her walker. Did exercises in the kitchen, then pt walked back to her chair in the living room. Daughter says her mom is determined.     Pt's hip x-ray negative for fracture. PCP gave pt 14 days worth of Tramadol. Pt is only taking 2 tabs at night. Hip pain is improving allowing her do more with PT.      Daughter denies s/s of CHF.    Pt doing well overall. Will f/u 2 weeks.    Sandrita Gee RN  [phone]

## 2020-11-20 ENCOUNTER — TELEPHONE (OUTPATIENT)
Dept: FAMILY MEDICINE | Facility: CLINIC | Age: 85
End: 2020-11-20

## 2020-11-20 NOTE — TELEPHONE ENCOUNTER
CALL TO HOME CAR NURSE  RELATED PT WAS SEEN BY DR LLANOS - WOUND CARE SPECIALIST TODAY AND HAS RECHECK Wednesday  ALSO RELATED AT TIME OF OV HERE DAUGHTER STATED SHE WAS LIVING WITH HER - RN STATES THEY HAVE HIRED [facility] CARE BUT THEY DO NOT ALWAYS COME TO HOUSE - SHE WILL VERIFY AND DISCUSS WITH DAUGHTER RE NH

## 2020-11-20 NOTE — TELEPHONE ENCOUNTER
[facility] RN voiced concern about patient living situation. She feels pt may need to consider long-term care. Pt lives alone and not able to transfer properly. Leg wound looked horrible even after ER. Aids that help with ADLS often do not show. Suggested maybe family needs to know longterm care may be best option.

## 2020-11-20 NOTE — TELEPHONE ENCOUNTER
Spoke with Home Care RNJenelle. Pt has been in hospital so RN not able to draw. Lab are just routine. So will have complete when able to do so next week

## 2020-11-23 ENCOUNTER — TELEPHONE (OUTPATIENT)
Dept: FAMILY MEDICINE | Facility: CLINIC | Age: 85
End: 2020-11-23

## 2020-11-23 NOTE — TELEPHONE ENCOUNTER
Called Home Care and they stated labs will be drawn next week. Pt was admitted in hospital and was not able to be drawn this week

## 2020-11-23 NOTE — TELEPHONE ENCOUNTER
----- Message from Sandrita Gee RN sent at 11/20/2020  7:51 AM EST -----  Regarding: RE: Labs  Thank you. I did let Isis know you had called them and confirmed they had rec'd the orders, etc. I feel your frustration.   ----- Message -----  From: Corinne Wing RN  Sent: 11/19/2020   5:25 PM EST  To: Sandrita Gee RN  Subject: RE: Labs                                         Ug! They told me they would call the daughter with exact date. I will call them again in am and make them give me a response.    Thank you,  Corinne  ----- Message -----  From: Sandrita Gee RN  Sent: 11/19/2020   4:57 PM EST  To: Corinne Wing RN  Subject: [place]                                             Daughter Isis has not heard from the lab as to when they will come out. I see you called them again on Monday. Isis wondered if they were not coming out due to pt being on antibiotics. Please update Isis regarding lab draw. Thanks, Sandrita

## 2020-12-03 ENCOUNTER — PATIENT OUTREACH (OUTPATIENT)
Dept: CASE MANAGEMENT | Facility: CLINIC | Age: 85
End: 2020-12-03

## 2020-12-03 NOTE — PROGRESS NOTES
TCM f/u CHF. Spoke with daughter Isis. Dr. Folres and Dr Villagomez want pt to have a CT scan of her leg and blood work. Home care still has not drawn labs.     Pt is doing better. She is walking with a walker. PT has been working with her on exercises. Pt has an aide with her at all times from 8 am - 3 pm. Daughter's  (on disability) is with pt until 8 and daughter is home from work by 2. Daughter and son in-law are with her the rest of the time. Reviewed falls/home safety.     Daughter denies s/s of CHF.     Daughter denies issues, questions or concerns. Pt is stable. Will close to .     Sandrita Gee, RN  [phone]

## 2020-12-26 PROCEDURE — G0179 MD RECERTIFICATION HHA PT: HCPCS | Performed by: FAMILY MEDICINE

## 2021-01-04 RX ORDER — LOSARTAN POTASSIUM 100 MG/1
100 TABLET ORAL
Qty: 30 TABLET | Refills: 1 | Status: SHIPPED | OUTPATIENT
Start: 2021-01-04 | End: 2021-03-29

## 2021-01-04 NOTE — TELEPHONE ENCOUNTER
Ms.   Medicine Refill Request    Last Office Visit: 11/9/2020  Last Telemedicine Visit: Visit date not found    Next Office Visit: Visit date not found  Next Telemedicine Visit: Visit date not found         Current Outpatient Medications:   •  amLODIPine (NORVASC) 10 mg tablet, Take 1 tablet (10 mg total) by mouth once daily., Disp: 90 tablet, Rfl: 1  •  aspirin 81 mg enteric coated tablet, Take 81 mg by mouth daily., Disp: , Rfl:   •  atorvastatin (LIPITOR) 40 mg tablet, Take 1 tablet (40 mg total) by mouth daily., Disp: 90 tablet, Rfl: 1  •  cholecalciferol, vitamin D3, (VITAMIN D3) 100 mcg (4,000 unit) capsule,  , Disp: , Rfl:   •  colchicine (COLCRYS) 0.6 mg tablet, Take 1 tablet (0.6 mg total) by mouth daily., Disp: 90 tablet, Rfl: 0  •  FREESTYLE 28 gauge lancets, Test sugar once daily. Dx: E11.9, Disp: 100 each, Rfl: 1  •  FREESTYLE LITE METER kit, Test sugar once daily. Dx: E11.9, Disp: 1 each, Rfl: 0  •  furosemide (LASIX) 40 mg tablet, Take 1 tablet (40 mg total) by mouth daily., Disp: 90 tablet, Rfl: 1  •  gabapentin (NEURONTIN) 100 mg capsule, Take 2 capsules in am, 3 capsules hs, Disp: 150 capsule, Rfl: 1  •  losartan (COZAAR) 100 mg tablet, TAKE 1 Tablet BY MOUTH EVERY DAY, Disp: 30 tablet, Rfl: 1  •  metFORMIN XR (GLUCOPHAGE-XR) 500 mg 24 hr tablet, Take 1 tablet (500 mg total) by mouth 2 (two) times a day., Disp: 180 tablet, Rfl: 1  •  olopatadine (PATADAY) 0.2 % ophthalmic solution, Administer 1 drop into both eyes daily., Disp: 5 mL, Rfl: 0  •  pantoprazole (PROTONIX) 40 mg EC tablet, Take 1 tablet (40 mg total) by mouth once daily., Disp: 90 tablet, Rfl: 1  •  potassium chloride (KLOR-CON) 10 mEq CR tablet, Take 1 tablet (10 mEq total) by mouth daily., Disp: 90 tablet, Rfl: 1  •  tamsulosin (FLOMAX) 0.4 mg capsule, Take 1 capsule (0.4 mg total) by mouth once daily., Disp: 90 capsule, Rfl: 1  •  TRUE METRIX GLUCOSE TEST STRIP strip, TEST BLOOD SUGAR ONCE DAILY, Disp: 100 strip, Rfl: 1      BP  Readings from Last 3 Encounters:   11/09/20 127/70   01/23/20 118/78   09/26/19 128/80       Recent Lab results:  Lab Results   Component Value Date    CHOL 172 03/15/2019   ,   Lab Results   Component Value Date    HDL 59 03/15/2019   ,   Lab Results   Component Value Date    LDLCALC 93 03/15/2019   ,   Lab Results   Component Value Date    TRIG 108 03/15/2019        Lab Results   Component Value Date    GLUCOSE 92 03/15/2019   ,   Lab Results   Component Value Date    HGBA1C 5.7 08/23/2019         Lab Results   Component Value Date    CREATININE 0.86 03/15/2019       Lab Results   Component Value Date    TSH 2.15 03/15/2019

## 2021-01-06 DIAGNOSIS — E11.9 TYPE 2 DIABETES MELLITUS WITHOUT COMPLICATION, WITHOUT LONG-TERM CURRENT USE OF INSULIN (CMS/HCC): Primary | ICD-10-CM

## 2021-01-06 RX ORDER — BLOOD-GLUCOSE METER
KIT MISCELLANEOUS
Qty: 1 EACH | Refills: 0 | Status: SHIPPED | OUTPATIENT
Start: 2021-01-06 | End: 2022-01-06

## 2021-01-06 RX ORDER — LANCETS 28 GAUGE
EACH MISCELLANEOUS
Qty: 100 EACH | Refills: 1 | Status: SHIPPED | OUTPATIENT
Start: 2021-01-06

## 2021-01-06 RX ORDER — BLOOD SUGAR DIAGNOSTIC
STRIP MISCELLANEOUS
Qty: 100 STRIP | Refills: 1 | Status: SHIPPED | OUTPATIENT
Start: 2021-01-06 | End: 2021-11-05

## 2021-01-06 NOTE — TELEPHONE ENCOUNTER
[place] CHF protocol     Lasix 40 mg BID x 3 days    Weigh daily  Also ? Home draw missed November - need labs ASAP

## 2021-01-06 NOTE — TELEPHONE ENCOUNTER
Spoke with daughter. Aware of lasix BID dosing for 3 days. Looking into options or home draw for pt. [facility] not [facility]!    1.Pt needs new glucose meter and test [facility]-her[facility]  2. When does pt need to be seen in follow-up? 3 months or 6?    Spoke with Home Care draw will be done 1/11/21

## 2021-01-06 NOTE — TELEPHONE ENCOUNTER
Daughter called concerned that pt gained 3 lbs in 3 days (Sun 1/3-181 lbs, 184 lbs this am). Pt in no signs of distress, no SOB, no productive cough, no increased edema. Please advise

## 2021-01-09 RX ORDER — GABAPENTIN 100 MG/1
CAPSULE ORAL
Qty: 150 CAPSULE | Refills: 1 | Status: SHIPPED | OUTPATIENT
Start: 2021-01-09 | End: 2021-02-03

## 2021-01-11 ENCOUNTER — TELEPHONE (OUTPATIENT)
Dept: FAMILY MEDICINE | Facility: CLINIC | Age: 86
End: 2021-01-11

## 2021-01-12 NOTE — TELEPHONE ENCOUNTER
Hard copy script written. M/l on pt daughter vm to return call as to where she would like RX sent. [facility]?    Faxed there. Will sent elsewhere if needed

## 2021-01-14 ENCOUNTER — TELEPHONE (OUTPATIENT)
Dept: FAMILY MEDICINE | Facility: CLINIC | Age: 86
End: 2021-01-14

## 2021-01-14 NOTE — TELEPHONE ENCOUNTER
Pt daughter called. Pt weight Saturday 1/9:181.2 lbs, this am pt weighed 184.5. 3 lbs gained in 5 days. Daughter denies increased edema or SOB    Also wondering if you receive lab results from home lab draw Monday 1/11

## 2021-01-14 NOTE — TELEPHONE ENCOUNTER
LOOK AT YOUR NOTE - SMART PHRASE GOT ATTACHED   PLEASE CORRECT  DID NOT GET LABS - PLEASE GET THEM

## 2021-01-14 NOTE — TELEPHONE ENCOUNTER
Smart phrase deleted-I have no clue how that even happened.   [facility] called-Will fax results right over.

## 2021-01-26 ENCOUNTER — TELEPHONE (OUTPATIENT)
Dept: FAMILY MEDICINE | Facility: CLINIC | Age: 86
End: 2021-01-26

## 2021-01-26 NOTE — TELEPHONE ENCOUNTER
Pt daughter called questioning mom's renal function status. Explained BUN elevated more than last time. Can come with age, can come from diuretics. Usually make sure pt is hydrated and avoids NSAID but daughter explained pt is on a fluid restriction. Daughter just wanted to know if change in labs is something to be concerned with or followed up on

## 2021-01-27 NOTE — TELEPHONE ENCOUNTER
Let her know we follow regularly with her lab work   she will be getting additional labs now that dr anguiano is ordering an evaluation of her blood vessels due to her leg wounds

## 2021-02-03 RX ORDER — GABAPENTIN 100 MG/1
CAPSULE ORAL
Qty: 150 CAPSULE | Refills: 1 | Status: SHIPPED | OUTPATIENT
Start: 2021-02-03 | End: 2021-03-08

## 2021-02-03 NOTE — TELEPHONE ENCOUNTER
Medicine Refill Request    Query RX gabapentin     Last Office Visit: 11/9/2020  Last Telemedicine Visit: Visit date not found    Next Office Visit: Visit date not found  Next Telemedicine Visit: Visit date not found         Current Outpatient Medications:   •  amLODIPine (NORVASC) 10 mg tablet, Take 1 tablet (10 mg total) by mouth once daily., Disp: 90 tablet, Rfl: 1  •  aspirin 81 mg enteric coated tablet, Take 81 mg by mouth daily., Disp: , Rfl:   •  atorvastatin (LIPITOR) 40 mg tablet, Take 1 tablet (40 mg total) by mouth daily., Disp: 90 tablet, Rfl: 1  •  cholecalciferol, vitamin D3, (VITAMIN D3) 100 mcg (4,000 unit) capsule,  , Disp: , Rfl:   •  colchicine (COLCRYS) 0.6 mg tablet, Take 1 tablet (0.6 mg total) by mouth daily., Disp: 90 tablet, Rfl: 0  •  freestyle 28 gauge lancets, Test sugar once daily. Dx: E11.9, Disp: 100 each, Rfl: 1  •  FREESTYLE LITE METER kit, Test sugar once daily. Dx: E11.9, Disp: 1 each, Rfl: 0  •  FREESTYLE TEST strip, Test daily, Disp: 100 strip, Rfl: 1  •  furosemide (LASIX) 40 mg tablet, Take 1 tablet (40 mg total) by mouth daily., Disp: 90 tablet, Rfl: 1  •  gabapentin (NEURONTIN) 100 mg capsule, TAKE TWO CAPSULES BY MOUTH EVERY MORNING AND TAKE THREE CAPSULES DAILY AT BEDTIME, Disp: 150 capsule, Rfl: 1  •  losartan (COZAAR) 100 mg tablet, Take 1 tablet (100 mg total) by mouth once daily., Disp: 30 tablet, Rfl: 1  •  metFORMIN XR (GLUCOPHAGE-XR) 500 mg 24 hr tablet, Take 1 tablet (500 mg total) by mouth 2 (two) times a day., Disp: 180 tablet, Rfl: 1  •  olopatadine (PATADAY) 0.2 % ophthalmic solution, Administer 1 drop into both eyes daily., Disp: 5 mL, Rfl: 0  •  pantoprazole (PROTONIX) 40 mg EC tablet, Take 1 tablet (40 mg total) by mouth once daily., Disp: 90 tablet, Rfl: 1  •  potassium chloride (KLOR-CON) 10 mEq CR tablet, Take 1 tablet (10 mEq total) by mouth daily., Disp: 90 tablet, Rfl: 1  •  tamsulosin (FLOMAX) 0.4 mg capsule, Take 1 capsule (0.4 mg total) by mouth once  daily., Disp: 90 capsule, Rfl: 1      BP Readings from Last 3 Encounters:   11/09/20 127/70   01/23/20 118/78   09/26/19 128/80       Recent Lab results:  Lab Results   Component Value Date    CHOL 172 03/15/2019   ,   Lab Results   Component Value Date    HDL 59 03/15/2019   ,   Lab Results   Component Value Date    LDLCALC 93 03/15/2019   ,   Lab Results   Component Value Date    TRIG 108 03/15/2019        Lab Results   Component Value Date    GLUCOSE 92 03/15/2019   ,   Lab Results   Component Value Date    HGBA1C 5.7 08/23/2019         Lab Results   Component Value Date    CREATININE 0.86 03/15/2019       Lab Results   Component Value Date    TSH 2.15 03/15/2019

## 2021-02-04 RX ORDER — COLCHICINE 0.6 MG/1
TABLET, FILM COATED ORAL
Qty: 90 TABLET | Refills: 0 | Status: SHIPPED | OUTPATIENT
Start: 2021-02-04 | End: 2021-04-26

## 2021-03-08 ENCOUNTER — TELEPHONE (OUTPATIENT)
Dept: FAMILY MEDICINE | Facility: CLINIC | Age: 86
End: 2021-03-08

## 2021-03-08 RX ORDER — GABAPENTIN 100 MG/1
CAPSULE ORAL
Qty: 150 CAPSULE | Refills: 1 | Status: SHIPPED
Start: 2021-03-08 | End: 2021-05-11 | Stop reason: SDUPTHER

## 2021-03-08 NOTE — TELEPHONE ENCOUNTER
Called Daughter back, pt is not having any issues with SOB or cough with this am weight gain of 3.5 lbs.    Please advise    Also needs refill on [facility]

## 2021-03-29 RX ORDER — LOSARTAN POTASSIUM 100 MG/1
TABLET ORAL
Qty: 30 TABLET | Refills: 1 | Status: SHIPPED | OUTPATIENT
Start: 2021-03-29 | End: 2021-04-05 | Stop reason: SDUPTHER

## 2021-04-05 ENCOUNTER — OFFICE VISIT (OUTPATIENT)
Dept: FAMILY MEDICINE | Facility: CLINIC | Age: 86
End: 2021-04-05
Payer: MEDICARE

## 2021-04-05 VITALS
HEIGHT: 60 IN | RESPIRATION RATE: 16 BRPM | SYSTOLIC BLOOD PRESSURE: 112 MMHG | BODY MASS INDEX: 36.52 KG/M2 | TEMPERATURE: 97.7 F | WEIGHT: 186 LBS | DIASTOLIC BLOOD PRESSURE: 62 MMHG | OXYGEN SATURATION: 97 % | HEART RATE: 84 BPM

## 2021-04-05 DIAGNOSIS — I50.22 CHRONIC SYSTOLIC CONGESTIVE HEART FAILURE (CMS/HCC): ICD-10-CM

## 2021-04-05 DIAGNOSIS — I73.9 PVD (PERIPHERAL VASCULAR DISEASE) (CMS/HCC): ICD-10-CM

## 2021-04-05 DIAGNOSIS — E78.2 MIXED HYPERLIPIDEMIA: ICD-10-CM

## 2021-04-05 DIAGNOSIS — E11.9 TYPE 2 DIABETES MELLITUS WITHOUT COMPLICATION, WITHOUT LONG-TERM CURRENT USE OF INSULIN (CMS/HCC): ICD-10-CM

## 2021-04-05 DIAGNOSIS — I69.351 HEMIPLEGIA AND HEMIPARESIS FOLLOWING CEREBRAL INFARCTION AFFECTING RIGHT DOMINANT SIDE (CMS/HCC): ICD-10-CM

## 2021-04-05 DIAGNOSIS — I10 ESSENTIAL HYPERTENSION: ICD-10-CM

## 2021-04-05 DIAGNOSIS — Z00.00 MEDICARE ANNUAL WELLNESS VISIT, SUBSEQUENT: Primary | ICD-10-CM

## 2021-04-05 DIAGNOSIS — M81.0 AGE-RELATED OSTEOPOROSIS WITHOUT CURRENT PATHOLOGICAL FRACTURE: ICD-10-CM

## 2021-04-05 DIAGNOSIS — G47.9 SLEEP DISORDER: ICD-10-CM

## 2021-04-05 DIAGNOSIS — E55.9 VITAMIN D DEFICIENCY: ICD-10-CM

## 2021-04-05 PROCEDURE — 99999 PR OFFICE/OUTPT VISIT,PROCEDURE ONLY: CPT | Performed by: FAMILY MEDICINE

## 2021-04-05 PROCEDURE — G8752 SYS BP LESS 140: HCPCS | Performed by: FAMILY MEDICINE

## 2021-04-05 PROCEDURE — G8754 DIAS BP LESS 90: HCPCS | Performed by: FAMILY MEDICINE

## 2021-04-05 PROCEDURE — 99214 OFFICE O/P EST MOD 30 MIN: CPT | Mod: 25 | Performed by: FAMILY MEDICINE

## 2021-04-05 RX ORDER — FUROSEMIDE 40 MG/1
40 TABLET ORAL DAILY
Qty: 90 TABLET | Refills: 1 | Status: SHIPPED | OUTPATIENT
Start: 2021-04-05 | End: 2021-05-11 | Stop reason: SDUPTHER

## 2021-04-05 RX ORDER — LOSARTAN POTASSIUM 100 MG/1
100 TABLET ORAL
Qty: 30 TABLET | Refills: 1 | Status: SHIPPED | OUTPATIENT
Start: 2021-04-05 | End: 2021-05-11

## 2021-04-05 RX ORDER — TAMSULOSIN HYDROCHLORIDE 0.4 MG/1
0.4 CAPSULE ORAL DAILY
Qty: 90 CAPSULE | Refills: 1 | Status: SHIPPED | OUTPATIENT
Start: 2021-04-05 | End: 2021-05-11 | Stop reason: SDUPTHER

## 2021-04-05 RX ORDER — PANTOPRAZOLE SODIUM 40 MG/1
40 TABLET, DELAYED RELEASE ORAL
Qty: 90 TABLET | Refills: 1 | Status: SHIPPED | OUTPATIENT
Start: 2021-04-05 | End: 2021-05-11 | Stop reason: SDUPTHER

## 2021-04-05 RX ORDER — OLOPATADINE HYDROCHLORIDE 2 MG/ML
1 SOLUTION/ DROPS OPHTHALMIC DAILY
Qty: 5 ML | Refills: 0 | Status: SHIPPED | OUTPATIENT
Start: 2021-04-05 | End: 2021-05-15

## 2021-04-05 RX ORDER — POTASSIUM CHLORIDE 750 MG/1
10 TABLET, EXTENDED RELEASE ORAL DAILY
Qty: 90 TABLET | Refills: 1 | Status: SHIPPED | OUTPATIENT
Start: 2021-04-05 | End: 2021-05-11 | Stop reason: SDUPTHER

## 2021-04-05 RX ORDER — AMLODIPINE BESYLATE 10 MG/1
10 TABLET ORAL
Qty: 90 TABLET | Refills: 1 | Status: CANCELLED | OUTPATIENT
Start: 2021-04-05

## 2021-04-05 RX ORDER — GABAPENTIN 100 MG/1
CAPSULE ORAL
Qty: 150 CAPSULE | Refills: 1 | Status: CANCELLED | OUTPATIENT
Start: 2021-04-05

## 2021-04-05 RX ORDER — COLCHICINE 0.6 MG/1
0.6 TABLET ORAL
Qty: 90 TABLET | Refills: 0 | Status: CANCELLED | OUTPATIENT
Start: 2021-04-05

## 2021-04-05 RX ORDER — CLOPIDOGREL BISULFATE 75 MG/1
75 TABLET ORAL DAILY
COMMUNITY
End: 2022-02-04

## 2021-04-05 RX ORDER — ATORVASTATIN CALCIUM 40 MG/1
40 TABLET, FILM COATED ORAL DAILY
Qty: 90 TABLET | Refills: 1 | Status: SHIPPED | OUTPATIENT
Start: 2021-04-05 | End: 2021-05-11 | Stop reason: SDUPTHER

## 2021-04-05 RX ORDER — METFORMIN HYDROCHLORIDE 500 MG/1
500 TABLET, EXTENDED RELEASE ORAL 2 TIMES DAILY
Qty: 180 TABLET | Refills: 1 | Status: SHIPPED | OUTPATIENT
Start: 2021-04-05 | End: 2021-05-11 | Stop reason: SDUPTHER

## 2021-04-05 RX ORDER — ASPIRIN 81 MG/1
81 TABLET ORAL DAILY
Status: CANCELLED | OUTPATIENT
Start: 2021-04-05

## 2021-04-05 ASSESSMENT — PATIENT HEALTH QUESTIONNAIRE - PHQ9: SUM OF ALL RESPONSES TO PHQ9 QUESTIONS 1 & 2: 0

## 2021-04-05 ASSESSMENT — MINI COG
COMPLETED: YES
TOTAL SCORE: 4

## 2021-04-05 NOTE — PROGRESS NOTES
Subjective     Thelma Gomes is a 85 y.o. female who presents for a subsequent annual wellness visit.     Patient Care Team:  Traci Katz DO as PCP - General  Fransico Flores DO as Referring Physician (Vascular Surgery)  Nina Luis OD as Referring Physician (Optometry)    Comprehensive Medical and Social History  Patient Active Problem List   Diagnosis   • Chronic systolic congestive heart failure (CMS/HCC)   • Type 2 diabetes mellitus without complication, without long-term current use of insulin (CMS/[facility])   • Essential hypertension   • Gastroesophageal reflux disease without esophagitis   • Mixed hyperlipidemia   • PVD (peripheral vascular disease) (CMS/[facility])   • Sleep disorder   • Age-related osteoporosis without current pathological fracture   • Vitamin D deficiency   • 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  Past Surgical History:   Procedure Laterality Date   •  SECTION     • CORONARY STENT PLACEMENT       Allergies   Allergen Reactions   • Cephalexin Monohydrate    • Cortisone Acetate    • Morphine      Other reaction(s): Rash     Current Outpatient Medications   Medication Sig Dispense Refill   • amLODIPine (NORVASC) 10 mg tablet Take 1 tablet (10 mg total) by mouth once daily. 90 tablet 1   • aspirin 81 mg enteric coated tablet Take 81 mg by mouth daily.     • atorvastatin (LIPITOR) 40 mg tablet Take 1 tablet (40 mg total) by mouth daily. 90 tablet 1   • cholecalciferol, vitamin D3, (VITAMIN D3) 100 mcg (4,000 unit) capsule       • clopidogreL (PLAVIX) 75 mg tablet Take 75 mg by mouth daily.     • COLCRYS 0.6 mg tablet TAKE 1 Tablet BY MOUTH EVERY DAY 90 tablet 0   • freestyle 28 gauge lancets Test sugar once daily. Dx: E11.9 100 each 1   • FREESTYLE LITE METER kit Test sugar once daily. Dx: E11.9 1 each 0   • FREESTYLE TEST strip Test daily 100  strip 1   • furosemide (LASIX) 40 mg tablet Take 1 tablet (40 mg total) by mouth daily. 90 tablet 1   • gabapentin (NEURONTIN) 100 mg capsule TAKE TWO CAPSULES BY MOUTH EVERY MORNING AND TAKE THREE CAPSULES DAILY AT BEDTIME 150 capsule 1   • losartan (COZAAR) 100 mg tablet Take 1 tablet (100 mg total) by mouth once daily. 30 tablet 1   • metFORMIN XR (GLUCOPHAGE-XR) 500 mg 24 hr tablet Take 1 tablet (500 mg total) by mouth 2 (two) times a day. 180 tablet 1   • olopatadine (PATADAY) 0.2 % ophthalmic solution Administer 1 drop into both eyes daily. 5 mL 0   • pantoprazole (PROTONIX) 40 mg EC tablet Take 1 tablet (40 mg total) by mouth once daily. 90 tablet 1   • potassium chloride (KLOR-CON) 10 mEq CR tablet Take 1 tablet (10 mEq total) by mouth daily. 90 tablet 1   • tamsulosin (FLOMAX) 0.4 mg capsule Take 1 capsule (0.4 mg total) by mouth once daily. 90 capsule 1     No current facility-administered medications for this visit.      Social History     Tobacco Use   • Smoking status: Former Smoker   • Smokeless tobacco: Never Used   Substance Use Topics   • Alcohol use: No   • Drug use: Not on file     Family History   Problem Relation Age of Onset   • Diabetes Biological Mother    • Breast cancer Biological Sister    • Hypertension Biological Sister    • Stroke Biological Sister    • Lung cancer Biological Brother        Objective   Vitals  Vitals:    04/05/21 1000   BP: 112/62   BP Location: Left upper arm   Patient Position: Sitting   Pulse: 84   Resp: 16   Temp: 36.5 °C (97.7 °F)   TempSrc: Temporal   SpO2: 97%   Weight: 84.4 kg (186 lb)   Height: 1.524 m (5')     Body mass index is 36.33 kg/m².    Advanced Care Plan                                        PHQ  Will the patient answer the depression questions?: Yes   Over the past 2 weeks, how often have you been bothered by feeling little interest or pleasure in doing things?: Not at all   Over the past 2 weeks, how often have you been bothered by feeling down,  depressed, or hopeless?: Not at all   Depression Risk: 0                                             Mini Cog  Completed: Yes  Score: 4  Result: Negative      Get Up and Go  Result: Pass    STEADI Falls Risk  One or more falls in the last year: No           Has trouble stepping up onto a curb: No   Advised to use a cane or walker to get around safely: Yes   Often has to rush to the toilet: No   Feels unsteady when walking: No   Has lost some feeling in feet: No   Often feels sad or depressed: No   Steadies self on furniture while walking at home: No   Takes medication that makes him/her feel lightheaded or more tired than usual: No   Worried about falling: Yes   Takes medicine to sleep or improve mood: No   Needs to push with hands when rising from a chair: No   Falls screen completed: Yes     Hearing and Vision Screening  No exam data present  See HRA for relevant hearing screening response.    Assessment/Plan   Diagnoses and all orders for this visit:    Medicare annual wellness visit, subsequent (Primary)    Type 2 diabetes mellitus without complication, without long-term current use of insulin (CMS/[facility])  Assessment & Plan:  UNKNOWN diabetic control.  Current medications reviewed.  Recommend diet modifications and weight loss.   Met with patient to review diet, exercise, and glucometer readings  Reviewed medications and the importance of compliance  Diabetes health maintenance plan and follow up was discussed and understood by patient    Orders:  -     Hemoglobin A1c; Future  -     Microalbumin / creatinine urine ratio; Future    Essential hypertension  Assessment & Plan:  Hypertension well controlled    /62 - goal < 130/80  Recommend regular exercise and low salt diet  Risk and potential complications of hypertension discussed  Importance of medication compliance and regular follow up emphasized  Role of medication/diet/exercise in treating hypertension discussed  Treatment plan and follow up reviewed and  understood by patient    Orders:  -     CBC and Differential; Future  -     Comprehensive metabolic panel; Future    Mixed hyperlipidemia  Assessment & Plan:  UNKNOWN hyperlipidemia control  CONTINUE CURRENT THERAPY  Recommend low fat diet - Risks and potential complications of hyperlipidemia reviewed  Role of medications,diet, exercise in the treatment of hyperlipidemia discussed   Treatment plan and follow up reviewed and understood by patient    Orders:  -     Comprehensive metabolic panel; Future  -     Lipid panel; Future    PVD (peripheral vascular disease) (CMS/[facility])  Assessment & Plan:  Continue anti platelet RX      Hemiplegia and hemiparesis following cerebral infarction affecting right dominant side (CMS/[facility])    Sleep disorder    Chronic systolic congestive heart failure (CMS/[facility])    Age-related osteoporosis without current pathological fracture  Assessment & Plan:  Continue prolia      Vitamin D deficiency  Assessment & Plan:  Check labs re dosing - benefits vitamin D discussed  Recent labs reviewed and discussed with pt       Other orders  -     atorvastatin (LIPITOR) 40 mg tablet; Take 1 tablet (40 mg total) by mouth daily.  -     furosemide (LASIX) 40 mg tablet; Take 1 tablet (40 mg total) by mouth daily.  -     losartan (COZAAR) 100 mg tablet; Take 1 tablet (100 mg total) by mouth once daily.  -     metFORMIN XR (GLUCOPHAGE-XR) 500 mg 24 hr tablet; Take 1 tablet (500 mg total) by mouth 2 (two) times a day.  -     olopatadine (PATADAY) 0.2 % ophthalmic solution; Administer 1 drop into both eyes daily.  -     pantoprazole (PROTONIX) 40 mg EC tablet; Take 1 tablet (40 mg total) by mouth once daily.  -     potassium chloride (KLOR-CON) 10 mEq CR tablet; Take 1 tablet (10 mEq total) by mouth daily.  -     tamsulosin (FLOMAX) 0.4 mg capsule; Take 1 capsule (0.4 mg total) by mouth once daily.      See Patient Instructions (the written plan) which was given to the patient for PPPS and health risk factors with  interventions.

## 2021-04-05 NOTE — PROGRESS NOTES
Subjective      Patient ID: Thelma Gomes is a 85 y.o. female.    HPI     Presents with daughter   Recent stenting LE arteries - wound healing  Swelling right leg x 1 week    Denies pain - OK in am - worse a end of day  Following diet     The following have been reviewed and updated as appropriate in this visit:  Allergies  Meds  Problems       Review of Systems   Constitutional: Positive for fatigue. Negative for chills, fever and unexpected weight change.   HENT: Negative for ear pain, hearing loss, sore throat, tinnitus and voice change.    Eyes: Negative for visual disturbance (eye exam due).   Respiratory: Negative for cough, chest tightness, shortness of breath, wheezing and stridor.    Cardiovascular: Negative for chest pain, palpitations and leg swelling.   Gastrointestinal: Negative for abdominal pain, blood in stool, constipation, diarrhea and nausea.   Endocrine: Negative for cold intolerance and heat intolerance.   Genitourinary: Negative for dysuria, frequency, pelvic pain, vaginal bleeding and vaginal discharge.   Musculoskeletal: Positive for back pain and gait problem. Negative for arthralgias.   Skin: Positive for wound.   Neurological: Negative for dizziness, tremors, seizures, syncope, weakness, light-headedness and headaches.   Hematological: Does not bruise/bleed easily.   Psychiatric/Behavioral: Positive for dysphoric mood. Negative for sleep disturbance.       Current Outpatient Medications   Medication Sig Dispense Refill   • amLODIPine (NORVASC) 10 mg tablet Take 1 tablet (10 mg total) by mouth once daily. 90 tablet 1   • aspirin 81 mg enteric coated tablet Take 81 mg by mouth daily.     • atorvastatin (LIPITOR) 40 mg tablet Take 1 tablet (40 mg total) by mouth daily. 90 tablet 1   • cholecalciferol, vitamin D3, (VITAMIN D3) 100 mcg (4,000 unit) capsule       • clopidogreL (PLAVIX) 75 mg tablet Take 75 mg by mouth daily.     • COLCRYS 0.6 mg tablet TAKE 1 Tablet BY MOUTH EVERY DAY 90  tablet 0   • freestyle 28 gauge lancets Test sugar once daily. Dx: E11.9 100 each 1   • FREESTYLE LITE METER kit Test sugar once daily. Dx: E11.9 1 each 0   • FREESTYLE TEST strip Test daily 100 strip 1   • furosemide (LASIX) 40 mg tablet Take 1 tablet (40 mg total) by mouth daily. 90 tablet 1   • gabapentin (NEURONTIN) 100 mg capsule TAKE TWO CAPSULES BY MOUTH EVERY MORNING AND TAKE THREE CAPSULES DAILY AT BEDTIME 150 capsule 1   • losartan (COZAAR) 100 mg tablet Take 1 tablet (100 mg total) by mouth once daily. 30 tablet 1   • metFORMIN XR (GLUCOPHAGE-XR) 500 mg 24 hr tablet Take 1 tablet (500 mg total) by mouth 2 (two) times a day. 180 tablet 1   • olopatadine (PATADAY) 0.2 % ophthalmic solution Administer 1 drop into both eyes daily. 5 mL 0   • pantoprazole (PROTONIX) 40 mg EC tablet Take 1 tablet (40 mg total) by mouth once daily. 90 tablet 1   • potassium chloride (KLOR-CON) 10 mEq CR tablet Take 1 tablet (10 mEq total) by mouth daily. 90 tablet 1   • tamsulosin (FLOMAX) 0.4 mg capsule Take 1 capsule (0.4 mg total) by mouth once daily. 90 capsule 1     No current facility-administered medications for this visit.      No past medical history on file.  Family History   Problem Relation Age of Onset   • Diabetes Biological Mother    • Breast cancer Biological Sister    • Hypertension Biological Sister    • Stroke Biological Sister    • Lung cancer Biological Brother      Past Surgical History:   Procedure Laterality Date   •  SECTION     • CORONARY STENT PLACEMENT       Allergies   Allergen Reactions   • Cephalexin Monohydrate    • Cortisone Acetate    • Morphine      Other reaction(s): Rash     Social History     Socioeconomic History   • Marital status:      Spouse name: Not on file   • Number of children: Not on file   • Years of education: Not on file   • Highest education level: Not on file   Occupational History   • Not on file   Social Needs   • Financial resource strain: Not on file   •  Food insecurity     Worry: Not on file     Inability: Not on file   • Transportation needs     Medical: Not on file     Non-medical: Not on file   Tobacco Use   • Smoking status: Former Smoker   • Smokeless tobacco: Never Used   Substance and Sexual Activity   • Alcohol use: No   • Drug use: Not on file   • Sexual activity: Defer   Lifestyle   • Physical activity     Days per week: Not on file     Minutes per session: Not on file   • Stress: Not on file   Relationships   • Social connections     Talks on phone: Not on file     Gets together: Not on file     Attends Hoahaoism service: Not on file     Active member of club or organization: Not on file     Attends meetings of clubs or organizations: Not on file     Relationship status: Not on file   • Intimate partner violence     Fear of current or ex partner: Not on file     Emotionally abused: Not on file     Physically abused: Not on file     Forced sexual activity: Not on file   Other Topics Concern   • Not on file   Social History Narrative   • Not on file     Vitals:    04/05/21 1000   BP: 112/62   BP Location: Left upper arm   Patient Position: Sitting   Pulse: 84   Resp: 16   Temp: 36.5 °C (97.7 °F)   TempSrc: Temporal   SpO2: 97%   Weight: 84.4 kg (186 lb)   Height: 1.524 m (5')     Objective     Physical Exam  Constitutional:       Appearance: She is well-developed. She is ill-appearing.   Eyes:      Pupils: Pupils are equal, round, and reactive to light.      Funduscopic exam:     Right eye: No AV nicking.         Left eye: No AV nicking.   Neck:      Thyroid: No thyromegaly.      Vascular: Carotid bruit (bilat) present.   Cardiovascular:      Rate and Rhythm: Normal rate and regular rhythm.      Pulses:           Carotid pulses are 1+ on the right side and 1+ on the left side.       Radial pulses are 1+ on the right side and 1+ on the left side.        Femoral pulses are 1+ on the right side and 1+ on the left side.       Popliteal pulses are 1+ on the  right side and 1+ on the left side.        Dorsalis pedis pulses are 1+ on the right side and 1+ on the left side.        Posterior tibial pulses are 1+ on the right side and 1+ on the left side.      Heart sounds: Murmur present. Systolic murmur present with a grade of 2/6.      Comments: bilat LE dressings   Pulmonary:      Effort: Pulmonary effort is normal.      Breath sounds: Normal breath sounds.   Abdominal:      General: Bowel sounds are normal.      Palpations: Abdomen is soft. There is no hepatomegaly or splenomegaly.      Tenderness: There is no abdominal tenderness. There is no right CVA tenderness or left CVA tenderness.   Musculoskeletal:      Right lower leg: No edema.      Left lower leg: No edema.   Lymphadenopathy:      Cervical: No cervical adenopathy.      Upper Body:      Right upper body: No supraclavicular adenopathy.      Left upper body: No supraclavicular adenopathy.   Skin:     General: Skin is warm and dry.      Findings: No rash.   Neurological:      Mental Status: She is alert and oriented to person, place, and time.      Sensory: No sensory deficit.   Psychiatric:         Mood and Affect: Mood normal.         Speech: Speech normal.         Behavior: Behavior normal.         Assessment/Plan   Problem List Items Addressed This Visit        Nervous    Hemiplegia and hemiparesis following cerebral infarction affecting right dominant side (CMS/HCC)       Circulatory    Chronic systolic congestive heart failure (CMS/HCC)    Relevant Medications    clopidogreL (PLAVIX) 75 mg tablet    atorvastatin (LIPITOR) 40 mg tablet    losartan (COZAAR) 100 mg tablet    Essential hypertension     Hypertension well controlled    /62 - goal < 130/80  Recommend regular exercise and low salt diet  Risk and potential complications of hypertension discussed  Importance of medication compliance and regular follow up emphasized  Role of medication/diet/exercise in treating hypertension discussed  Treatment  plan and follow up reviewed and understood by patient         Relevant Medications    furosemide (LASIX) 40 mg tablet    losartan (COZAAR) 100 mg tablet    Other Relevant Orders    CBC and Differential    Comprehensive metabolic panel    PVD (peripheral vascular disease) (CMS/[facility])     Continue anti platelet RX            Digestive    Vitamin D deficiency     Check labs re dosing - benefits vitamin D discussed  Recent labs reviewed and discussed with pt             Musculoskeletal    Age-related osteoporosis without current pathological fracture     Continue prolia            Endocrine/Metabolic    Type 2 diabetes mellitus without complication, without long-term current use of insulin (CMS/[facility])     UNKNOWN diabetic control.  Current medications reviewed.  Recommend diet modifications and weight loss.   Met with patient to review diet, exercise, and glucometer readings  Reviewed medications and the importance of compliance  Diabetes health maintenance plan and follow up was discussed and understood by patient         Relevant Medications    metFORMIN XR (GLUCOPHAGE-XR) 500 mg 24 hr tablet    Other Relevant Orders    Hemoglobin A1c    Microalbumin / creatinine urine ratio    Mixed hyperlipidemia     UNKNOWN hyperlipidemia control  CONTINUE CURRENT THERAPY  Recommend low fat diet - Risks and potential complications of hyperlipidemia reviewed  Role of medications,diet, exercise in the treatment of hyperlipidemia discussed   Treatment plan and follow up reviewed and understood by patient         Relevant Medications    atorvastatin (LIPITOR) 40 mg tablet    Other Relevant Orders    Comprehensive metabolic panel    Lipid panel       Other    Sleep disorder      Other Visit Diagnoses     Medicare annual wellness visit, subsequent    -  Primary          Traci Katz DO  4/7/2021

## 2021-04-05 NOTE — PATIENT INSTRUCTIONS
Your Personalized Prevention Plan Services (PPPS)    Preventive Services Checklist (Assumes Average Risk Unless Otherwise Noted):    Health Maintenance Topics with due status: Overdue       Topic Date Due    Varicella Vaccines Never done    Hepatitis C Screening Never done    DTaP, Tdap, and Td Vaccines Never done    Zoster Vaccine Never done    Urine Protein Screening 09/24/2019    Medicare Annual Wellness Visit 03/25/2020    Annual Dilated Retinal Exam 04/01/2020    Hemoglobin A1C 08/23/2020    Diabetic Foot Exam 09/29/2020     Health Maintenance Topics with due status: Not Due       Topic Last Completion Date    DEXA Scan 10/03/2019     Health Maintenance Topics with due status: Completed       Topic Last Completion Date    Pneumococcal (65 years and older) 10/14/2014    Influenza Vaccine 10/21/2020    Annual Falls Risk Screening 04/05/2021     Health Maintenance Topics with due status: Aged Out       Topic Date Due    Meningococcal ACWY Aged Out    HIB Vaccines Aged Out    IPV Vaccines Aged Out    HPV Vaccines Aged Out    Pneumococcal Aged Out       You May Be Eligible for These Additional Preventive Services   (Assumes Average Risk Unless Otherwise Noted)  Diabetes Screening Any 1 risk factor: hypertension, dyslipidemia, obesity, high glucose; or Any 2 risk factors: >=64yo, overweight, family history diabetes (covered every 6 months)   Hepatitis C Screening Any 1 risk factor: 1) blood transfusion before 1992,   2) current or past injection drug use (annually for high risk; if born between 0220[birth year]3895, see above for status).   Vaccine: Hepatitis B As necessary if at-risk: hemophilia, ESRD, diabetes, living with individual infected with hep B, healthcare worker with frequent contact with blood/bodily fluids (series covered once)   Sexually Transmitted Diseases (STDs) As necessary chlamydia, gonorrhea, syphilis, hepatitis B (covered annually)  HIV if any 1 risk factor present: 1) <14yo or  >66yo and at increased risk or 2) 15-66yo and ask for it (covered annually)   Lung Cancer Screening Low dose chest CT if all 3 risk factors: 1) 55-76yo, 2) smoker or quit within last 15y, 3) >=30 pack years (covered annually).  No results found for this or any previous visit.     Cholesterol Screening Both risk factors: 1) >=21yo and 2)  increased risk coronary artery disease (covered every 5 years).     Breast Cancer Screening Covered once 35-38yo, annually >=41yo (if >=51yo, see above for status).     Glaucoma Screening Any 1 risk factor: 1) diabetes, 2) family history glaucoma, 3)  >=51yo, 4)  American >=66yo (covered annually)           Health Risk Factors with Personalized Education:  ----------------------------------------------------------------------------------------------------------------------  Controlling Your Blood Pressure  · Maintain a normal weight (body mass index between 18.5 and 24.9).  · Eat more fruit, vegetables and low-fat dairy.  · Eat less saturated fat and total fat.  · Lower your sodium (salt) intake.  Try to stay under 1500 mg per day, but if you cannot get your intake to be that low, at least lower it by 1000 mg.  · Stay active.  Try to get at least 90 to 150 minutes of exercise per week.  Try brisk walking, swimming, bicycling or dancing.  · Limit alcohol intake.  When you do consume alcohol, drink no more than 1 drink per day.  · If you have been prescribed medication, take it regularly and exactly as prescribed.  Let your PCP know if you have any problems or questions about your medication.  · Check your blood pressure at home or at the store.  Write down your readings and share them with your PCP  ----------------------------------------------------------------------------------------------------------------------  Controlling Your Diabetes  · Stress can raise your blood sugar.  Learn what causes your stress.  Learn to lower your stress by spending time with  family and friends, exercising, deep breathing, trying meditation or yoga, enjoying hobbies and getting enough rest.  Let your PCP know if you’re having problems limiting your stress.  · Maintain a healthy weight by balancing your diet and exercise.  · Choose foods that are lower in calories, saturated fat, trans fat, sugar and salt.  Eat foods with more fiber, like whole grain cereals, bread, crackers, rice or pasta.  Choose to eat fruit, vegetables, and low-fat or fat-free/skim dairy.  · Reduce the portion size of your meals.  Make half of your meal vegetables and fruit, and divide the other half between lean protein and whole grains.  · Drink water instead of juice and regular soda.  · Spend at least some time being active on most days of the week.  · Work to increase your muscle strength at least twice per week.  Try things like light weights, stretch bands, yoga, heavy gardening (digging, planting with tools) or push-ups.  · If you have been prescribed medication, take it regularly and exactly as prescribed.  Let your PCP know if you have any problems or questions about your medication.  · If you have been asked to check your blood sugar, write down your readings and share them with your PCP  ----------------------------------------------------------------------------------------------------------------------  Controlling Your Cholesterol  · Reduce the amount of saturated and trans fat in your diet.  Limit intake of red meat.  Consume only low-fat or non-fat/skim dairy.  Limit fried food.  Cook with vegetable oils.  · Reduce your intake of sugary foods, sugary drinks and alcohol.  · Eat a diet high in fruit, vegetables and whole grains.  · Get protein from fish, poultry and a small portion of nuts.  · Stay active.  Try to get at least 90 to 150 minutes of exercise per week.  Try brisk walking, swimming, bicycling or dancing.  · Maintain a healthy weight by balancing your diet and exercise.  · If you have been  prescribed medication, take it regularly and exactly as prescribed. Let your PCP know if you have any problems or questions about your medication.  · It’s important to know your cholesterol numbers.  When recommended by your PCP, get the cholesterol blood test.  ---------------------------------------------------------------------------------------------------------------------   Controlling Your Osteoporosis, Strengthening Your Bones  · Try to get at least 90 to 150 minutes of weight-bearing exercise per week.  · Ensure intake of at least 1200mg of calcium per day.  Eat foods high in calcium like milk and other dairy, green vegetables, fruit, canned fish with soft and edible bones, nuts, calcium-set tofu.  Some foods are calcium-fortified, like bread, cereal, fruit juices and mineral water.  · Help your body make vitamin D by getting 10-15 minutes per day of sunlight.    · Ensure intake of at least 600IU of vitamin D per day.  Eat foods high in vitamin D like oily fish (salmon, sardines, mackerel) and eggs.  Some foods are fortified with vitamin D, like dairy and cereals.  · Avoid high amounts of caffeine and salt, since they can cause the body to loose calcium.  · Limit alcohol intake, since it is associated with weaker bones and is associated with falls and fractures.  · Limit intake of fizzy drinks.  · If you have been prescribed medication, take it regularly and exactly as prescribed.  Let your PCP know if you have any problems or questions about your medication  ----------------------------------------------------------------------------------------------------------------------  Reducing Your Risk of Falls  · Tell your PCP if any of your medications make you feel tired, dizzy, lightheaded or off-balance.  · Maintain coordination, flexibility and balance by ensuring regular physical activity.  · Limit alcohol intake to 1 drink per day.  Consider avoiding all alcohol intake.  · Ensure good vision.  Visit an  ophthalmologist or optometrist regularly for vision screening or to make sure your glasses / contact lens prescription is correct.  If you need glasses or contacts, wear them.  When you get new glasses or contacts, take time to get used to them.  Do not wear sunglasses or tinted lenses when indoors.  · Ensure good hearing.  Have your hearing checked if you are having trouble hearing, or family and friends think you cannot hear them.  If you need a hearing aid, be sure it fits well and wear it.  · Get enough rest.  Ensure about 7-9 hours of sleep every day.  · Get up slowly from your bed or chairs.  Do not start walking until you are sure you feel steady.  · Wear non-skid, rubber-soled, low-heeled shoes.  Do not walk in socks, or in shoes and slippers with smooth soles.  · If your PCP or therapist recommends using a cane or walker, use it regularly.  · Make your home safer.  Increase lighting throughout the house, especially at the top and bottom of stairs.  Ensure lighting is easily turned on when getting up in the middle of the night.  Make sure there are two secure rails on all stairs.  Install grab bars in the bathtub / shower and near the toilet.  Consider using a shower chair and / or a hand-held shower.  · Spread sand or salt on icy surfaces.  Beware of wet surfaces, which can be icy.  · Tell your PCP if you have fallen.    STOP AMLODIPINE     CLOSE FOLLOW BLOOD SUGAR      Routine advice given on diet/exercise/weight  Recommend monthly self breast exams  Osteoporosis screening discussed  Recommend calcium with D3  Immunization update discussed

## 2021-04-07 ASSESSMENT — ENCOUNTER SYMPTOMS
WOUND: 1
CONSTIPATION: 0
SHORTNESS OF BREATH: 0
FATIGUE: 1
SEIZURES: 0
PALPITATIONS: 0
COUGH: 0
WHEEZING: 0
VOICE CHANGE: 0
UNEXPECTED WEIGHT CHANGE: 0
BRUISES/BLEEDS EASILY: 0
ABDOMINAL PAIN: 0
NAUSEA: 0
FEVER: 0
SLEEP DISTURBANCE: 0
SORE THROAT: 0
CHILLS: 0
CHEST TIGHTNESS: 0
STRIDOR: 0
BLOOD IN STOOL: 0
DYSURIA: 0
DIARRHEA: 0
ARTHRALGIAS: 0
DIZZINESS: 0
DYSPHORIC MOOD: 1
FREQUENCY: 0
BACK PAIN: 1
LIGHT-HEADEDNESS: 0
TREMORS: 0
WEAKNESS: 0
HEADACHES: 0

## 2021-04-08 NOTE — ASSESSMENT & PLAN NOTE
Hypertension well controlled    /62 - goal < 130/80  Recommend regular exercise and low salt diet  Risk and potential complications of hypertension discussed  Importance of medication compliance and regular follow up emphasized  Role of medication/diet/exercise in treating hypertension discussed  Treatment plan and follow up reviewed and understood by patient

## 2021-04-26 RX ORDER — AMLODIPINE BESYLATE 10 MG/1
TABLET ORAL
Qty: 90 TABLET | Refills: 1 | Status: SHIPPED | OUTPATIENT
Start: 2021-04-26 | End: 2021-10-10

## 2021-04-26 RX ORDER — COLCHICINE 0.6 MG/1
TABLET ORAL
Qty: 90 TABLET | Refills: 0 | Status: SHIPPED | OUTPATIENT
Start: 2021-04-26 | End: 2021-05-11 | Stop reason: SDUPTHER

## 2021-04-26 NOTE — TELEPHONE ENCOUNTER
Medicine Refill Request    Query RX colcrys, amlodipine     Last Office Visit: 4/5/2021  Last Telemedicine Visit: Visit date not found    Next Office Visit: 10/6/2021  Next Telemedicine Visit: Visit date not found         Current Outpatient Medications:   •  amLODIPine (NORVASC) 10 mg tablet, Take 1 tablet (10 mg total) by mouth once daily., Disp: 90 tablet, Rfl: 1  •  aspirin 81 mg enteric coated tablet, Take 81 mg by mouth daily., Disp: , Rfl:   •  atorvastatin (LIPITOR) 40 mg tablet, Take 1 tablet (40 mg total) by mouth daily., Disp: 90 tablet, Rfl: 1  •  cholecalciferol, vitamin D3, (VITAMIN D3) 100 mcg (4,000 unit) capsule,  , Disp: , Rfl:   •  clopidogreL (PLAVIX) 75 mg tablet, Take 75 mg by mouth daily., Disp: , Rfl:   •  COLCRYS 0.6 mg tablet, TAKE 1 Tablet BY MOUTH EVERY DAY, Disp: 90 tablet, Rfl: 0  •  freestyle 28 gauge lancets, Test sugar once daily. Dx: E11.9, Disp: 100 each, Rfl: 1  •  FREESTYLE LITE METER kit, Test sugar once daily. Dx: E11.9, Disp: 1 each, Rfl: 0  •  FREESTYLE TEST strip, Test daily, Disp: 100 strip, Rfl: 1  •  furosemide (LASIX) 40 mg tablet, Take 1 tablet (40 mg total) by mouth daily., Disp: 90 tablet, Rfl: 1  •  gabapentin (NEURONTIN) 100 mg capsule, TAKE TWO CAPSULES BY MOUTH EVERY MORNING AND TAKE THREE CAPSULES DAILY AT BEDTIME, Disp: 150 capsule, Rfl: 1  •  losartan (COZAAR) 100 mg tablet, Take 1 tablet (100 mg total) by mouth once daily., Disp: 30 tablet, Rfl: 1  •  metFORMIN XR (GLUCOPHAGE-XR) 500 mg 24 hr tablet, Take 1 tablet (500 mg total) by mouth 2 (two) times a day., Disp: 180 tablet, Rfl: 1  •  olopatadine (PATADAY) 0.2 % ophthalmic solution, Administer 1 drop into both eyes daily., Disp: 5 mL, Rfl: 0  •  pantoprazole (PROTONIX) 40 mg EC tablet, Take 1 tablet (40 mg total) by mouth once daily., Disp: 90 tablet, Rfl: 1  •  potassium chloride (KLOR-CON) 10 mEq CR tablet, Take 1 tablet (10 mEq total) by mouth daily., Disp: 90 tablet, Rfl: 1  •  tamsulosin (FLOMAX) 0.4 mg  capsule, Take 1 capsule (0.4 mg total) by mouth once daily., Disp: 90 capsule, Rfl: 1      BP Readings from Last 3 Encounters:   04/05/21 112/62   11/09/20 127/70   01/23/20 118/78       Recent Lab results:  Lab Results   Component Value Date    CHOL 172 03/15/2019   ,   Lab Results   Component Value Date    HDL 59 03/15/2019   ,   Lab Results   Component Value Date    LDLCALC 93 03/15/2019   ,   Lab Results   Component Value Date    TRIG 108 03/15/2019        Lab Results   Component Value Date    GLUCOSE 92 03/15/2019   ,   Lab Results   Component Value Date    HGBA1C 5.7 08/23/2019         Lab Results   Component Value Date    CREATININE 0.86 03/15/2019       Lab Results   Component Value Date    TSH 2.15 03/15/2019

## 2021-05-11 RX ORDER — COLCHICINE 0.6 MG/1
0.6 TABLET ORAL
Qty: 90 TABLET | Refills: 0 | Status: SHIPPED | OUTPATIENT
Start: 2021-05-11 | End: 2022-09-11

## 2021-05-11 RX ORDER — GABAPENTIN 100 MG/1
CAPSULE ORAL
Qty: 150 CAPSULE | Refills: 3 | Status: SHIPPED | OUTPATIENT
Start: 2021-05-11 | End: 2021-09-08

## 2021-05-11 RX ORDER — TAMSULOSIN HYDROCHLORIDE 0.4 MG/1
0.4 CAPSULE ORAL DAILY
Qty: 90 CAPSULE | Refills: 1 | Status: SHIPPED | OUTPATIENT
Start: 2021-05-11 | End: 2022-01-20 | Stop reason: SDUPTHER

## 2021-05-11 RX ORDER — LOSARTAN POTASSIUM 100 MG/1
TABLET ORAL
Qty: 30 TABLET | Refills: 3 | Status: SHIPPED | OUTPATIENT
Start: 2021-05-11 | End: 2021-05-11 | Stop reason: SDUPTHER

## 2021-05-11 RX ORDER — ATORVASTATIN CALCIUM 40 MG/1
40 TABLET, FILM COATED ORAL DAILY
Qty: 90 TABLET | Refills: 1 | Status: SHIPPED | OUTPATIENT
Start: 2021-05-11 | End: 2021-10-26

## 2021-05-11 RX ORDER — FUROSEMIDE 40 MG/1
40 TABLET ORAL DAILY
Qty: 90 TABLET | Refills: 1 | Status: SHIPPED | OUTPATIENT
Start: 2021-05-11 | End: 2022-01-20 | Stop reason: SDUPTHER

## 2021-05-11 RX ORDER — METFORMIN HYDROCHLORIDE 500 MG/1
500 TABLET, EXTENDED RELEASE ORAL 2 TIMES DAILY
Qty: 180 TABLET | Refills: 1 | Status: SHIPPED | OUTPATIENT
Start: 2021-05-11 | End: 2022-03-28 | Stop reason: SDUPTHER

## 2021-05-11 RX ORDER — PANTOPRAZOLE SODIUM 40 MG/1
40 TABLET, DELAYED RELEASE ORAL
Qty: 90 TABLET | Refills: 1 | Status: SHIPPED | OUTPATIENT
Start: 2021-05-11 | End: 2022-01-20 | Stop reason: SDUPTHER

## 2021-05-11 RX ORDER — LOSARTAN POTASSIUM 100 MG/1
100 TABLET ORAL
Qty: 30 TABLET | Refills: 3 | Status: SHIPPED | OUTPATIENT
Start: 2021-05-11 | End: 2021-08-11

## 2021-05-11 RX ORDER — POTASSIUM CHLORIDE 750 MG/1
10 TABLET, EXTENDED RELEASE ORAL DAILY
Qty: 90 TABLET | Refills: 1 | Status: SHIPPED | OUTPATIENT
Start: 2021-05-11 | End: 2021-10-26

## 2021-05-12 RX ORDER — GABAPENTIN 100 MG/1
CAPSULE ORAL
Qty: 150 CAPSULE | Refills: 3 | OUTPATIENT
Start: 2021-05-12

## 2021-05-12 RX ORDER — ATORVASTATIN CALCIUM 40 MG/1
40 TABLET, FILM COATED ORAL DAILY
Qty: 90 TABLET | Refills: 1 | OUTPATIENT
Start: 2021-05-12 | End: 2021-11-08

## 2021-05-12 RX ORDER — PANTOPRAZOLE SODIUM 40 MG/1
40 TABLET, DELAYED RELEASE ORAL
Qty: 90 TABLET | Refills: 1 | OUTPATIENT
Start: 2021-05-12

## 2021-05-12 RX ORDER — COLCHICINE 0.6 MG/1
0.6 TABLET ORAL
Qty: 90 TABLET | Refills: 0 | OUTPATIENT
Start: 2021-05-12

## 2021-05-12 RX ORDER — LOSARTAN POTASSIUM 100 MG/1
100 TABLET ORAL
Qty: 30 TABLET | Refills: 3 | OUTPATIENT
Start: 2021-05-12

## 2021-05-12 RX ORDER — METFORMIN HYDROCHLORIDE 500 MG/1
500 TABLET, EXTENDED RELEASE ORAL 2 TIMES DAILY
Qty: 180 TABLET | Refills: 1 | OUTPATIENT
Start: 2021-05-12 | End: 2021-11-08

## 2021-05-12 RX ORDER — TAMSULOSIN HYDROCHLORIDE 0.4 MG/1
0.4 CAPSULE ORAL DAILY
Qty: 90 CAPSULE | Refills: 1 | OUTPATIENT
Start: 2021-05-12 | End: 2021-08-10

## 2021-05-12 RX ORDER — POTASSIUM CHLORIDE 750 MG/1
10 TABLET, EXTENDED RELEASE ORAL DAILY
Qty: 90 TABLET | Refills: 1 | OUTPATIENT
Start: 2021-05-12

## 2021-05-12 NOTE — TELEPHONE ENCOUNTER
Medicine Refill Request    Last Office Visit: 4/5/2021  Last Telemedicine Visit: Visit date not found    Next Office Visit: 10/6/2021  Next Telemedicine Visit: Visit date not found         Current Outpatient Medications:   •  amLODIPine (NORVASC) 10 mg tablet, TAKE 1 Tablet BY MOUTH EVERY DAY, Disp: 90 tablet, Rfl: 1  •  aspirin 81 mg enteric coated tablet, Take 81 mg by mouth daily., Disp: , Rfl:   •  atorvastatin (LIPITOR) 40 mg tablet, Take 1 tablet (40 mg total) by mouth daily., Disp: 90 tablet, Rfl: 1  •  cholecalciferol, vitamin D3, (VITAMIN D3) 100 mcg (4,000 unit) capsule,  , Disp: , Rfl:   •  clopidogreL (PLAVIX) 75 mg tablet, Take 75 mg by mouth daily., Disp: , Rfl:   •  colchicine (COLCRYS) 0.6 mg tablet, Take 1 tablet (0.6 mg total) by mouth once daily., Disp: 90 tablet, Rfl: 0  •  freestyle 28 gauge lancets, Test sugar once daily. Dx: E11.9, Disp: 100 each, Rfl: 1  •  FREESTYLE LITE METER kit, Test sugar once daily. Dx: E11.9, Disp: 1 each, Rfl: 0  •  FREESTYLE TEST strip, Test daily, Disp: 100 strip, Rfl: 1  •  furosemide (LASIX) 40 mg tablet, Take 1 tablet (40 mg total) by mouth daily., Disp: 90 tablet, Rfl: 1  •  gabapentin (NEURONTIN) 100 mg capsule, Take 2 capsules in AM and 3 capsules PM, Disp: 150 capsule, Rfl: 3  •  losartan (COZAAR) 100 mg tablet, Take 1 tablet (100 mg total) by mouth once daily., Disp: 30 tablet, Rfl: 3  •  metFORMIN XR (GLUCOPHAGE-XR) 500 mg 24 hr tablet, Take 1 tablet (500 mg total) by mouth 2 (two) times a day., Disp: 180 tablet, Rfl: 1  •  olopatadine (PATADAY) 0.2 % ophthalmic solution, Administer 1 drop into both eyes daily., Disp: 5 mL, Rfl: 0  •  pantoprazole (PROTONIX) 40 mg EC tablet, Take 1 tablet (40 mg total) by mouth once daily., Disp: 90 tablet, Rfl: 1  •  potassium chloride (KLOR-CON) 10 mEq CR tablet, Take 1 tablet (10 mEq total) by mouth daily., Disp: 90 tablet, Rfl: 1  •  tamsulosin (FLOMAX) 0.4 mg capsule, Take 1 capsule (0.4 mg total) by mouth once daily.,  Disp: 90 capsule, Rfl: 1      BP Readings from Last 3 Encounters:   04/05/21 112/62   11/09/20 127/70   01/23/20 118/78       Recent Lab results:  Lab Results   Component Value Date    CHOL 172 03/15/2019   ,   Lab Results   Component Value Date    HDL 59 03/15/2019   ,   Lab Results   Component Value Date    LDLCALC 93 03/15/2019   ,   Lab Results   Component Value Date    TRIG 108 03/15/2019        Lab Results   Component Value Date    GLUCOSE 92 03/15/2019   ,   Lab Results   Component Value Date    HGBA1C 5.7 08/23/2019         Lab Results   Component Value Date    CREATININE 0.86 03/15/2019       Lab Results   Component Value Date    TSH 2.15 03/15/2019

## 2021-07-07 ENCOUNTER — OFFICE VISIT (OUTPATIENT)
Dept: FAMILY MEDICINE | Facility: CLINIC | Age: 86
End: 2021-07-07
Payer: MEDICARE

## 2021-07-07 VITALS
DIASTOLIC BLOOD PRESSURE: 68 MMHG | TEMPERATURE: 97.8 F | OXYGEN SATURATION: 98 % | SYSTOLIC BLOOD PRESSURE: 118 MMHG | HEART RATE: 71 BPM | RESPIRATION RATE: 16 BRPM

## 2021-07-07 DIAGNOSIS — M25.561 ACUTE PAIN OF RIGHT KNEE: Primary | ICD-10-CM

## 2021-07-07 PROCEDURE — 99213 OFFICE O/P EST LOW 20 MIN: CPT | Performed by: NURSE PRACTITIONER

## 2021-07-07 PROCEDURE — G8752 SYS BP LESS 140: HCPCS | Performed by: NURSE PRACTITIONER

## 2021-07-07 PROCEDURE — G8754 DIAS BP LESS 90: HCPCS | Performed by: NURSE PRACTITIONER

## 2021-07-07 ASSESSMENT — ENCOUNTER SYMPTOMS
SHORTNESS OF BREATH: 0
HEADACHES: 0
ARTHRALGIAS: 1
FEVER: 0
WHEEZING: 0
CHILLS: 0
FATIGUE: 0
NAUSEA: 0
DIARRHEA: 0
ABDOMINAL PAIN: 0
COUGH: 0
VOMITING: 0
CONSTIPATION: 0

## 2021-07-07 NOTE — PROGRESS NOTES
[facility]  [address] [place] [address]  [phone]     Reason for visit:   Chief Complaint   Patient presents with   • Knee Pain     right knee pain x weeks, progressively worse.       HPI   Thelma Gomes is a 85 y.o. female who presents with R lateral knee pain        Medical History:  History reviewed. No pertinent past medical history.    Surgical History:  Past Surgical History:   Procedure Laterality Date   •  SECTION     • CORONARY STENT PLACEMENT         Social History:  Social History     Social History Narrative   • Not on file       Family History:  Family History   Problem Relation Age of Onset   • Diabetes Biological Mother    • Breast cancer Biological Sister    • Hypertension Biological Sister    • Stroke Biological Sister    • Lung cancer Biological Brother        Allergies:  Cephalexin monohydrate, Cortisone acetate, and Morphine    Current Medications:  Current Outpatient Medications   Medication Sig Dispense Refill   • amLODIPine (NORVASC) 10 mg tablet TAKE 1 Tablet BY MOUTH EVERY DAY 90 tablet 1   • aspirin 81 mg enteric coated tablet Take 81 mg by mouth daily.     • atorvastatin (LIPITOR) 40 mg tablet Take 1 tablet (40 mg total) by mouth daily. 90 tablet 1   • cholecalciferol, vitamin D3, (VITAMIN D3) 100 mcg (4,000 unit) capsule       • clopidogreL (PLAVIX) 75 mg tablet Take 75 mg by mouth daily.     • colchicine (COLCRYS) 0.6 mg tablet Take 1 tablet (0.6 mg total) by mouth once daily. 90 tablet 0   • freestyle 28 gauge lancets Test sugar once daily. Dx: E11.9 100 each 1   • FREESTYLE LITE METER kit Test sugar once daily. Dx: E11.9 1 each 0   • FREESTYLE TEST strip Test daily 100 strip 1   • furosemide (LASIX) 40 mg tablet Take 1 tablet (40 mg total) by mouth daily. 90 tablet 1   • gabapentin (NEURONTIN) 100 mg capsule Take 2 capsules in AM and 3 capsules  capsule 3   • losartan (COZAAR) 100 mg tablet Take 1 tablet (100 mg total) by mouth once  daily. 30 tablet 3   • metFORMIN XR (GLUCOPHAGE-XR) 500 mg 24 hr tablet Take 1 tablet (500 mg total) by mouth 2 (two) times a day. 180 tablet 1   • olopatadine (PATADAY) 0.2 % ophthalmic solution INSTILL ONE DROP IN EACH EYE EVERY DAY 5 mL 1   • pantoprazole (PROTONIX) 40 mg EC tablet Take 1 tablet (40 mg total) by mouth once daily. 90 tablet 1   • potassium chloride (KLOR-CON) 10 mEq CR tablet Take 1 tablet (10 mEq total) by mouth daily. 90 tablet 1   • tamsulosin (FLOMAX) 0.4 mg capsule Take 1 capsule (0.4 mg total) by mouth once daily. 90 capsule 1     No current facility-administered medications for this visit.       Review of Systems:  Review of Systems   Constitutional: Negative for chills, fatigue and fever.   Respiratory: Negative for cough, shortness of breath and wheezing.    Cardiovascular: Negative for chest pain.   Gastrointestinal: Negative for abdominal pain, constipation, diarrhea, nausea and vomiting.   Musculoskeletal: Positive for arthralgias (R knee).   Neurological: Negative for headaches.       Objective     Vital Signs:  Vitals:    07/07/21 1146   BP: 118/68   Pulse: 71   Resp: 16   Temp: 36.6 °C (97.8 °F)   SpO2: 98%       BMI:  There is no height or weight on file to calculate BMI.     Physical Exam  Constitutional:       Appearance: Normal appearance.   HENT:      Head: Normocephalic and atraumatic.   Cardiovascular:      Rate and Rhythm: Normal rate and regular rhythm.      Heart sounds: Normal heart sounds.   Pulmonary:      Effort: Pulmonary effort is normal.      Breath sounds: Normal breath sounds.   Musculoskeletal:        Legs:    Neurological:      Mental Status: She is alert.   Psychiatric:         Behavior: Behavior is cooperative.         Recent labs before today:     Lab Results   Component Value Date    WBC 8.4 03/15/2019    HGB 13.3 03/15/2019    HCT 40.6 03/15/2019     03/15/2019    CHOL 172 03/15/2019    TRIG 108 03/15/2019    HDL 59 03/15/2019    ALT 7 03/15/2019     AST 18 03/15/2019     03/15/2019    K 4.0 03/15/2019     03/15/2019    CREATININE 0.86 03/15/2019    BUN 19 03/15/2019    CO2 29 03/15/2019    TSH 2.15 03/15/2019    HGBA1C 5.7 08/23/2019    MICROALBUR 8.4 09/24/2018        Procedures   Assessment     Patient Instructions   R IT band syndrome, pain just superior and lateral to the knee. Systemic NSAIDS contraindicated due to Plavix. Trial topical Voltaren for pain. Ice 10-15 minutes at a time 2-3 times a day as desired. IT band stretches as provided. Follow up as needed.     [unfilled]  Problem List Items Addressed This Visit     None             I spent 25 minutes on this date of service performing the following activities: obtaining history, performing examination, entering orders, documenting, providing counseling and education           Ravinder Mantilla DNP, BENIT[place]  7/7/2021

## 2021-07-07 NOTE — PATIENT INSTRUCTIONS
R IT band syndrome, pain just superior and lateral to the knee. Systemic NSAIDS contraindicated due to Plavix. Trial topical Voltaren for pain. Ice 10-15 minutes at a time 2-3 times a day as desired. IT band stretches as provided. Follow up as needed.

## 2021-08-11 RX ORDER — LOSARTAN POTASSIUM 100 MG/1
TABLET ORAL
Qty: 30 TABLET | Refills: 3 | Status: SHIPPED | OUTPATIENT
Start: 2021-08-11 | End: 2021-12-27

## 2021-08-12 ENCOUNTER — TELEPHONE (OUTPATIENT)
Dept: FAMILY MEDICINE | Facility: CLINIC | Age: 86
End: 2021-08-12

## 2021-08-12 RX ORDER — KETOCONAZOLE 20 MG/G
CREAM TOPICAL DAILY
Qty: 30 G | Refills: 1 | Status: SHIPPED | OUTPATIENT
Start: 2021-08-12 | End: 2021-09-24

## 2021-08-12 NOTE — TELEPHONE ENCOUNTER
Patient left message on nurse's line    Thelma was discharged from the wound care, Dr Andrade.  He had given her a prescription for ketoconazole cream 2% for fungus.    She is out of the cream and Dr Andrade is away. The office recommended she call you fora script.    She uses the [facility]    Please advise, thank you

## 2021-09-08 RX ORDER — GABAPENTIN 100 MG/1
CAPSULE ORAL
Qty: 150 CAPSULE | Refills: 3 | Status: SHIPPED | OUTPATIENT
Start: 2021-09-08 | End: 2022-02-18 | Stop reason: SDUPTHER

## 2021-09-08 NOTE — TELEPHONE ENCOUNTER
Medicine Refill Request    Query RX gabapentin     Last Office Visit: 7/7/2021  Last Telemedicine Visit: Visit date not found    Next Office Visit: 10/6/2021  Next Telemedicine Visit: Visit date not found         Current Outpatient Medications:   •  amLODIPine (NORVASC) 10 mg tablet, TAKE 1 Tablet BY MOUTH EVERY DAY, Disp: 90 tablet, Rfl: 1  •  aspirin 81 mg enteric coated tablet, Take 81 mg by mouth daily., Disp: , Rfl:   •  atorvastatin (LIPITOR) 40 mg tablet, Take 1 tablet (40 mg total) by mouth daily., Disp: 90 tablet, Rfl: 1  •  cholecalciferol, vitamin D3, (VITAMIN D3) 100 mcg (4,000 unit) capsule,  , Disp: , Rfl:   •  clopidogreL (PLAVIX) 75 mg tablet, Take 75 mg by mouth daily., Disp: , Rfl:   •  colchicine (COLCRYS) 0.6 mg tablet, Take 1 tablet (0.6 mg total) by mouth once daily., Disp: 90 tablet, Rfl: 0  •  freestyle 28 gauge lancets, Test sugar once daily. Dx: E11.9, Disp: 100 each, Rfl: 1  •  FREESTYLE LITE METER kit, Test sugar once daily. Dx: E11.9, Disp: 1 each, Rfl: 0  •  FREESTYLE TEST strip, Test daily, Disp: 100 strip, Rfl: 1  •  furosemide (LASIX) 40 mg tablet, Take 1 tablet (40 mg total) by mouth daily., Disp: 90 tablet, Rfl: 1  •  gabapentin (NEURONTIN) 100 mg capsule, Take 2 capsules in AM and 3 capsules PM, Disp: 150 capsule, Rfl: 3  •  ketoconazole (NIZORAL) 2 % cream, Apply topically daily., Disp: 30 g, Rfl: 1  •  losartan (COZAAR) 100 mg tablet, TAKE 1 Tablet BY MOUTH EVERY DAY, Disp: 30 tablet, Rfl: 3  •  metFORMIN XR (GLUCOPHAGE-XR) 500 mg 24 hr tablet, Take 1 tablet (500 mg total) by mouth 2 (two) times a day., Disp: 180 tablet, Rfl: 1  •  olopatadine (PATADAY) 0.2 % ophthalmic solution, INSTILL ONE DROP IN EACH EYE EVERY DAY, Disp: 5 mL, Rfl: 1  •  pantoprazole (PROTONIX) 40 mg EC tablet, Take 1 tablet (40 mg total) by mouth once daily., Disp: 90 tablet, Rfl: 1  •  potassium chloride (KLOR-CON) 10 mEq CR tablet, Take 1 tablet (10 mEq total) by mouth daily., Disp: 90 tablet, Rfl: 1  •   tamsulosin (FLOMAX) 0.4 mg capsule, Take 1 capsule (0.4 mg total) by mouth once daily., Disp: 90 capsule, Rfl: 1      BP Readings from Last 3 Encounters:   07/07/21 118/68   04/05/21 112/62   11/09/20 127/70       Recent Lab results:  Lab Results   Component Value Date    CHOL 172 03/15/2019   ,   Lab Results   Component Value Date    HDL 59 03/15/2019   ,   Lab Results   Component Value Date    LDLCALC 93 03/15/2019   ,   Lab Results   Component Value Date    TRIG 108 03/15/2019        Lab Results   Component Value Date    GLUCOSE 92 03/15/2019   ,   Lab Results   Component Value Date    HGBA1C 5.7 08/23/2019         Lab Results   Component Value Date    CREATININE 0.86 03/15/2019       Lab Results   Component Value Date    TSH 2.15 03/15/2019

## 2021-09-16 ENCOUNTER — TELEPHONE (OUTPATIENT)
Dept: FAMILY MEDICINE | Facility: CLINIC | Age: 86
End: 2021-09-16

## 2021-09-16 NOTE — TELEPHONE ENCOUNTER
Daughter called to inquire if Home Lab draw could be done for pt.  Advised that writer would check and contact her with answer.

## 2021-09-24 RX ORDER — KETOCONAZOLE 20 MG/G
CREAM TOPICAL
Qty: 30 G | Refills: 1 | Status: SHIPPED | OUTPATIENT
Start: 2021-09-24 | End: 2021-11-12 | Stop reason: SDUPTHER

## 2021-10-04 ENCOUNTER — TELEPHONE (OUTPATIENT)
Dept: FAMILY MEDICINE | Facility: CLINIC | Age: 86
End: 2021-10-04

## 2021-10-04 NOTE — TELEPHONE ENCOUNTER
PC from daughter this morning.  Mother has been weighing herself and telling weight to family.  Last weight 187 over a month ago.   Weight over weekend 198-200.  Daughter reports  Mother getting around much better.  No ankle swelling noted, denies SOB. Has appt. 10/06 with provider, labs being done tomorrow.

## 2021-10-06 ENCOUNTER — OFFICE VISIT (OUTPATIENT)
Dept: FAMILY MEDICINE | Facility: CLINIC | Age: 86
End: 2021-10-06
Payer: MEDICARE

## 2021-10-06 VITALS
TEMPERATURE: 97.6 F | SYSTOLIC BLOOD PRESSURE: 132 MMHG | BODY MASS INDEX: 39.66 KG/M2 | WEIGHT: 202 LBS | DIASTOLIC BLOOD PRESSURE: 84 MMHG | HEART RATE: 76 BPM | OXYGEN SATURATION: 98 % | HEIGHT: 60 IN | RESPIRATION RATE: 16 BRPM

## 2021-10-06 DIAGNOSIS — K21.9 GASTROESOPHAGEAL REFLUX DISEASE WITHOUT ESOPHAGITIS: ICD-10-CM

## 2021-10-06 DIAGNOSIS — E78.2 MIXED HYPERLIPIDEMIA: ICD-10-CM

## 2021-10-06 DIAGNOSIS — I10 ESSENTIAL HYPERTENSION: ICD-10-CM

## 2021-10-06 DIAGNOSIS — E11.9 TYPE 2 DIABETES MELLITUS WITHOUT COMPLICATION, WITHOUT LONG-TERM CURRENT USE OF INSULIN (CMS/HCC): Primary | ICD-10-CM

## 2021-10-06 DIAGNOSIS — Z23 NEED FOR INFLUENZA VACCINATION: ICD-10-CM

## 2021-10-06 DIAGNOSIS — I50.22 CHRONIC SYSTOLIC CONGESTIVE HEART FAILURE (CMS/HCC): ICD-10-CM

## 2021-10-06 LAB
ALBUMIN SERPL-MCNC: 3.8 G/DL (ref 3.6–5.1)
ALBUMIN/GLOB SERPL: 1.5 (CALC) (ref 1–2.5)
ALP SERPL-CCNC: 74 U/L (ref 37–153)
ALT SERPL-CCNC: 9 U/L (ref 6–29)
AST SERPL-CCNC: 15 U/L (ref 10–35)
BASOPHILS # BLD AUTO: 43 CELLS/UL (ref 0–200)
BASOPHILS NFR BLD AUTO: 0.6 %
BILIRUB SERPL-MCNC: 0.5 MG/DL (ref 0.2–1.2)
BUN SERPL-MCNC: 27 MG/DL (ref 7–25)
BUN/CREAT SERPL: 26 (CALC) (ref 6–22)
CALCIUM SERPL-MCNC: 9.3 MG/DL (ref 8.6–10.4)
CHLORIDE SERPL-SCNC: 105 MMOL/L (ref 98–110)
CHOLEST SERPL-MCNC: 165 MG/DL
CHOLEST/HDLC SERPL: 2.6 (CALC)
CO2 SERPL-SCNC: 29 MMOL/L (ref 20–32)
CREAT SERPL-MCNC: 1.04 MG/DL (ref 0.6–0.88)
EOSINOPHIL # BLD AUTO: 192 CELLS/UL (ref 15–500)
EOSINOPHIL NFR BLD AUTO: 2.7 %
ERYTHROCYTE [DISTWIDTH] IN BLOOD BY AUTOMATED COUNT: 13.2 % (ref 11–15)
GLOBULIN SER CALC-MCNC: 2.6 G/DL (CALC) (ref 1.9–3.7)
GLUCOSE SERPL-MCNC: 146 MG/DL (ref 65–99)
HBA1C MFR BLD: 7.5 % OF TOTAL HGB
HCT VFR BLD AUTO: 41.2 % (ref 35–45)
HDLC SERPL-MCNC: 63 MG/DL
HGB BLD-MCNC: 13 G/DL (ref 11.7–15.5)
LDLC SERPL CALC-MCNC: 74 MG/DL (CALC)
LYMPHOCYTES # BLD AUTO: 2464 CELLS/UL (ref 850–3900)
LYMPHOCYTES NFR BLD AUTO: 34.7 %
MCH RBC QN AUTO: 29.5 PG (ref 27–33)
MCHC RBC AUTO-ENTMCNC: 31.6 G/DL (ref 32–36)
MCV RBC AUTO: 93.4 FL (ref 80–100)
MONOCYTES # BLD AUTO: 518 CELLS/UL (ref 200–950)
MONOCYTES NFR BLD AUTO: 7.3 %
NEUTROPHILS # BLD AUTO: 3884 CELLS/UL (ref 1500–7800)
NEUTROPHILS NFR BLD AUTO: 54.7 %
NONHDLC SERPL-MCNC: 102 MG/DL (CALC)
PLATELET # BLD AUTO: 263 THOUSAND/UL (ref 140–400)
PMV BLD REES-ECKER: 10.5 FL (ref 7.5–12.5)
POTASSIUM SERPL-SCNC: 4.6 MMOL/L (ref 3.5–5.3)
PROT SERPL-MCNC: 6.4 G/DL (ref 6.1–8.1)
QUEST EGFR AFRICAN AMERICAN: 57 ML/MIN/1.73M2
QUEST EGFR NON-AFR. AMERICAN: 49 ML/MIN/1.73M2
RBC # BLD AUTO: 4.41 MILLION/UL (ref 3.8–5.1)
SODIUM SERPL-SCNC: 146 MMOL/L (ref 135–146)
TRIGL SERPL-MCNC: 181 MG/DL
WBC # BLD AUTO: 7.1 THOUSAND/UL (ref 3.8–10.8)

## 2021-10-06 PROCEDURE — G8754 DIAS BP LESS 90: HCPCS | Performed by: FAMILY MEDICINE

## 2021-10-06 PROCEDURE — 99214 OFFICE O/P EST MOD 30 MIN: CPT | Mod: 25 | Performed by: FAMILY MEDICINE

## 2021-10-06 PROCEDURE — G8752 SYS BP LESS 140: HCPCS | Performed by: FAMILY MEDICINE

## 2021-10-06 PROCEDURE — G0008 ADMIN INFLUENZA VIRUS VAC: HCPCS | Performed by: FAMILY MEDICINE

## 2021-10-06 PROCEDURE — 90694 VACC AIIV4 NO PRSRV 0.5ML IM: CPT | Performed by: FAMILY MEDICINE

## 2021-10-06 NOTE — PATIENT INSTRUCTIONS
EXERCISE   RECHECK LABS 3 MONTHS    DECREASE GABAPENTIN 1 AM / 2 PM    VACCINE INFORMATION STATEMENT    Influenza (Flu) Vaccine (Inactivated or Recombinant): What you need to know    Many vaccine information statements are available in [place]ian and other languages.   See www.immunize.org/vis    Hojas de información sobre vacunas están disponibles en español y en muchos otros   idiomas. Visite www.immunize.org/vis    1. Why get vaccinated?  Influenza vaccine can prevent influenza (flu).  Flu is a contagious disease that spreads around the [place] every year, usually between October and May. Anyone can get the flu, but it is more dangerous for some people. Infants and young children, people 65 years and older, pregnant people,   and people with certain health conditions or a weakened immune system are at greatest risk of flu complications.  Pneumonia, bronchitis, sinus infections, and ear infections are examples of flu-related complications. If you have a medical condition, such as heart disease, cancer, or diabetes, flu can make it worse.Flu can cause fever and chills, sore throat, muscle   aches, fatigue, cough, headache, and runny or stuffy nose. Some people may have vomiting and diarrhea, though this is more common in children than adults.In an average year, thousands of people in the [place] die from flu, and many more are   hospitalized. Flu vaccine prevents millions of illnesses and flu-related visits to the doctor each year.    2. Influenza vaccines  CDC recommends everyone 6 months and older get vaccinated every flu season. Children 6 months through 8 years of age may need 2 doses during a single flu season. Everyone else needs only 1 dose each flu season. It takes about 2 weeks for protection to develop after vaccination.There are many flu viruses, and they are always changing. Each year a new flu vaccine is made to protect against the influenza viruses believed to be likely to cause disease in  the upcoming flu season. Even when the vaccine doesn’t exactly match these viruses, it may still provide some protection.    Influenza vaccine does not cause flu.    Influenza vaccine may be given at the same time as other vaccines.    3. Talk with your health care provider  Tell your vaccination provider if the person getting the vaccine:  · Has had an allergic reaction after a previous dose of influenza vaccine, or has any severe, lifethreatening allergies  · Has ever had Guillain-Barré Syndrome (also called “GBS”)  In some cases, your health care provider may decide to postpone influenza vaccination until a future visit.  Influenza vaccine can be administered at any   time during pregnancy. People who are or will be   pregnant during influenza season should receive   inactivated influenza vaccine.    People with minor illnesses, such as a cold, may be vaccinated. People who are moderately or severely ill should usually wait until they recover before getting   influenza vaccine.  Your health care provider can give you more information.  4. Risks of a vaccine reaction  · Soreness, redness, and swelling where the shot is given, fever, muscle aches, and headache can happen after influenza vaccination.  · There may be a very small increased risk of Guillain-Barré Syndrome (GBS) after inactivated influenza vaccine (the flu shot).  Young children who get the flu shot along with pneumococcal vaccine (PCV13) and/or DTaP vaccine at the same time might be slightly more likely to have a seizure caused by fever. Tell your health care provider if a child who is getting flu vaccine has ever had a seizure.  People sometimes faint after medical procedures, including vaccination. Tell your provider if you feel dizzy or have vision changes or ringing in the ears.As with any medicine, there is a very remote chance of a vaccine causing a severe allergic reaction, other serious injury, or death.    5. What if there is a serious  problem?  An allergic reaction could occur after the vaccinated person leaves the clinic. If you see signs of a severe allergic reaction (hives, swelling of the face and throat, difficulty breathing, a fast heartbeat, dizziness, or weakness), call 9-1-1 and get the person   to the nearest hospital.  For other signs that concern you, call your health care provider.  Adverse reactions should be reported to the Vaccine Adverse Event Reporting System (VAERS). Your health care provider will usually file this report, or   you can do it yourself. Visit the VAERS website at www.vaers.[facility].gov or call 1-746.916.2365. VAERS is only for reporting reactions, and VAERS staff   members do not give medical advice.    6. The National Vaccine Injury Compensation Program  The National Vaccine Injury Compensation Program (VICP) is a federal program that was created to compensate people who may have been injured by certain vaccines. Claims regarding alleged injury or death due to vaccination have a time limit for filing,   which may be as short as two years. Visit the VICP website at www.hrsa.gov/vaccinecompensation or call 1-764.740.4141 to learn about the program and about filing a claim.    7. How can I learn more?  · ‚Ask your health care provider.  · Call your local or state health department.  · ‚Visit the website of the Food and Drug Administration (FDA) for vaccine package   inserts and additional information at www.fda.gov/vaccines-blood-biologics/vaccines.  · ‚Contact the Centers for Disease Control and Prevention (CDC):  -Call 1-981.151.3634 (8-326-QRR-INFO) or  -Visit CDC’s website at www.cdc.gov/flu    Vaccine Information Statement Inactivated Influenza Vaccine  42 U.S.C. § 300aa-26 8/6/2021

## 2021-10-06 NOTE — PROGRESS NOTES
Subjective      Patient ID: Thelma Gomes is a 85 y.o. female.    HPI     Medical management of diabetes, hypertension, hyperlipidemia  Pre visit care team ting performed  A report card has been prepared for today's visit  Presents with daughter   More active - asking to decrease gabapentin   Following diet -     The following have been reviewed and updated as appropriate in this visit:  Allergies  Meds  Problems       Review of Systems   Constitutional: Negative for chills, fatigue and unexpected weight change.   Eyes: Positive for visual disturbance.   Respiratory: Negative for cough and shortness of breath.    Cardiovascular: Negative for chest pain, palpitations and leg swelling.   Gastrointestinal: Negative for abdominal pain, blood in stool, constipation, diarrhea and nausea.   Endocrine: Negative for cold intolerance and heat intolerance.   Genitourinary: Negative for dysuria, frequency, hematuria and urgency.   Musculoskeletal: Positive for arthralgias.   Skin: Positive for wound (healed - continue wraps ). Negative for color change.   Neurological: Negative for dizziness, numbness and headaches.   Hematological: Negative for adenopathy. Does not bruise/bleed easily.   Psychiatric/Behavioral: Negative for sleep disturbance.       Current Outpatient Medications   Medication Sig Dispense Refill   • aspirin 81 mg enteric coated tablet Take 81 mg by mouth daily.     • atorvastatin (LIPITOR) 40 mg tablet Take 1 tablet (40 mg total) by mouth daily. 90 tablet 1   • cholecalciferol, vitamin D3, (VITAMIN D3) 100 mcg (4,000 unit) capsule       • clopidogreL (PLAVIX) 75 mg tablet Take 75 mg by mouth daily.     • colchicine (COLCRYS) 0.6 mg tablet Take 1 tablet (0.6 mg total) by mouth once daily. 90 tablet 0   • freestyle 28 gauge lancets Test sugar once daily. Dx: E11.9 100 each 1   • FREESTYLE LITE METER kit Test sugar once daily. Dx: E11.9 1 each 0   • FREESTYLE TEST strip Test daily 100 strip 1   • furosemide  (LASIX) 40 mg tablet Take 1 tablet (40 mg total) by mouth daily. 90 tablet 1   • gabapentin (NEURONTIN) 100 mg capsule TAKE TWO CAPSULES BY MOUTH EVERY MORNING AND TAKE THREE CAPSULES BY MOUTH EVERY EVENING 150 capsule 3   • ketoconazole (NIZORAL) 2 % cream APPLY TO THE AFFECTED AREA(S) DAILY 30 g 1   • losartan (COZAAR) 100 mg tablet TAKE 1 Tablet BY MOUTH EVERY DAY 30 tablet 3   • metFORMIN XR (GLUCOPHAGE-XR) 500 mg 24 hr tablet Take 1 tablet (500 mg total) by mouth 2 (two) times a day. 180 tablet 1   • pantoprazole (PROTONIX) 40 mg EC tablet Take 1 tablet (40 mg total) by mouth once daily. 90 tablet 1   • potassium chloride (KLOR-CON) 10 mEq CR tablet Take 1 tablet (10 mEq total) by mouth daily. 90 tablet 1   • tamsulosin (FLOMAX) 0.4 mg capsule Take 1 capsule (0.4 mg total) by mouth once daily. 90 capsule 1     No current facility-administered medications for this visit.     History reviewed. No pertinent past medical history.  Family History   Problem Relation Age of Onset   • Diabetes Biological Mother    • Breast cancer Biological Sister    • Hypertension Biological Sister    • Stroke Biological Sister    • Lung cancer Biological Brother      Past Surgical History:   Procedure Laterality Date   •  SECTION     • CORONARY STENT PLACEMENT       Allergies   Allergen Reactions   • Cephalexin Monohydrate    • Cortisone Acetate    • Morphine      Other reaction(s): Rash     Social History     Socioeconomic History   • Marital status:      Spouse name: Not on file   • Number of children: Not on file   • Years of education: Not on file   • Highest education level: Not on file   Occupational History   • Not on file   Tobacco Use   • Smoking status: Former Smoker   • Smokeless tobacco: Never Used   Substance and Sexual Activity   • Alcohol use: No   • Drug use: Not on file   • Sexual activity: Defer   Other Topics Concern   • Not on file   Social History Narrative   • Not on file     Social Determinants  of Health     Financial Resource Strain:    • Difficulty of Paying Living Expenses: Not on file   Food Insecurity:    • Worried About Running Out of Food in the Last Year: Not on file   • Ran Out of Food in the Last Year: Not on file   Transportation Needs:    • Lack of Transportation (Medical): Not on file   • Lack of Transportation (Non-Medical): Not on file   Physical Activity:    • Days of Exercise per Week: Not on file   • Minutes of Exercise per Session: Not on file   Stress:    • Feeling of Stress : Not on file   Social Connections:    • Frequency of Communication with Friends and Family: Not on file   • Frequency of Social Gatherings with Friends and Family: Not on file   • Attends Orthodoxy Services: Not on file   • Active Member of Clubs or Organizations: Not on file   • Attends Club or Organization Meetings: Not on file   • Marital Status: Not on file   Intimate Partner Violence:    • Fear of Current or Ex-Partner: Not on file   • Emotionally Abused: Not on file   • Physically Abused: Not on file   • Sexually Abused: Not on file   Housing Stability:    • Unable to Pay for Housing in the Last Year: Not on file   • Number of Places Lived in the Last Year: Not on file   • Unstable Housing in the Last Year: Not on file     Vitals:    10/06/21 0931 10/06/21 0951   BP: 128/76 132/84   BP Location: Left upper arm Left upper arm   Patient Position: Sitting Sitting   Pulse: 76    Resp: 16    Temp: 36.4 °C (97.6 °F)    TempSrc: Oral    SpO2: 98%    Weight: 91.6 kg (202 lb)    Height: 1.524 m (5')        Objective     Physical Exam  Constitutional:       Appearance: Normal appearance. She is well-developed.   Eyes:      Pupils: Pupils are equal, round, and reactive to light.      Funduscopic exam:     Right eye: No AV nicking.         Left eye: No AV nicking.   Neck:      Thyroid: No thyromegaly.      Vascular: Carotid bruit present.   Cardiovascular:      Rate and Rhythm: Normal rate and regular rhythm.       Pulses: Normal pulses.           Dorsalis pedis pulses are 2+ on the right side and 2+ on the left side.        Posterior tibial pulses are 2+ on the right side and 2+ on the left side.      Heart sounds: Murmur heard.    Systolic murmur is present with a grade of 2/6.      Pulmonary:      Effort: Pulmonary effort is normal.      Breath sounds: Normal breath sounds.   Chest:   Breasts:      Right: No supraclavicular adenopathy.      Left: No supraclavicular adenopathy.       Abdominal:      General: Bowel sounds are normal.      Palpations: Abdomen is soft. There is no hepatomegaly or splenomegaly.      Tenderness: There is no abdominal tenderness. There is no right CVA tenderness or left CVA tenderness.   Musculoskeletal:      Right lower leg: No edema.      Left lower leg: No edema.   Lymphadenopathy:      Cervical: No cervical adenopathy.      Upper Body:      Right upper body: No supraclavicular adenopathy.      Left upper body: No supraclavicular adenopathy.   Skin:     General: Skin is warm and dry.      Findings: No rash.   Neurological:      Mental Status: She is alert and oriented to person, place, and time.      Sensory: No sensory deficit.   Psychiatric:         Mood and Affect: Mood normal.         Speech: Speech normal.         Behavior: Behavior normal.         Assessment/Plan   Problem List Items Addressed This Visit        Circulatory    Chronic systolic congestive heart failure (CMS/HCC)    Essential hypertension       Digestive    Gastroesophageal reflux disease without esophagitis     Patient was counselled regarding triggers for symptoms - avoid caffeine/spicey foods/NO smoking  Elevate HOB 30 degrees - DO NOT lie flat for at least 30 minutes after eating  Take medications as directed            Endocrine/Metabolic    Type 2 diabetes mellitus without complication, without long-term current use of insulin (CMS/HCC) - Primary     FAIR diabetic control.     A1c 7.5 - goal < 7  Current medications  reviewed.  Recommend diet modifications and weight loss.   Met with patient to review diet, exercise, and glucometer readings  Reviewed medications and the importance of compliance  Diabetes health maintenance plan and follow up was discussed and understood by patient         Mixed hyperlipidemia     GOOD hyperlipidemia control      LDL  74  - goal < 80  CONTINUE CURRENT THERAPY  Recommend low fat diet - Risks and potential complications of hyperlipidemia reviewed  Role of medications,diet, exercise in the treatment of hyperlipidemia discussed   Treatment plan and follow up reviewed and understood by patient           Other Visit Diagnoses     Need for influenza vaccination        Relevant Orders    Influenza vaccine 65 and older IM preservative free (Completed)          Traci Katz DO  10/10/2021

## 2021-10-08 ENCOUNTER — CLINICAL SUPPORT (OUTPATIENT)
Dept: FAMILY MEDICINE | Facility: CLINIC | Age: 86
End: 2021-10-08
Payer: MEDICARE

## 2021-10-08 DIAGNOSIS — N30.90 CYSTITIS: Primary | ICD-10-CM

## 2021-10-08 LAB
BILIRUBIN, POC: NEGATIVE
BLOOD URINE, POC: NEGATIVE
CLARITY, POC: CLEAR
COLOR, POC: YELLOW
EXPIRATION DATE: ABNORMAL
GLUCOSE URINE, POC: ABNORMAL
KETONES, POC: NEGATIVE
LEUKOCYTE EST, POC: ABNORMAL
Lab: ABNORMAL
PH, POC: 5
POCT MANUFACTURER: ABNORMAL
PROTEIN, POC: NEGATIVE
SPECIFIC GRAVITY, POC: 1.01

## 2021-10-08 PROCEDURE — 81002 URINALYSIS NONAUTO W/O SCOPE: CPT | Performed by: FAMILY MEDICINE

## 2021-10-10 ASSESSMENT — ENCOUNTER SYMPTOMS
COUGH: 0
SHORTNESS OF BREATH: 0
NUMBNESS: 0
CHILLS: 0
ADENOPATHY: 0
HEADACHES: 0
HEMATURIA: 0
BLOOD IN STOOL: 0
DIARRHEA: 0
DYSURIA: 0
PALPITATIONS: 0
FATIGUE: 0
DIZZINESS: 0
ABDOMINAL PAIN: 0
WOUND: 1
ARTHRALGIAS: 1
CONSTIPATION: 0
BRUISES/BLEEDS EASILY: 0
FREQUENCY: 0
COLOR CHANGE: 0
SLEEP DISTURBANCE: 0
UNEXPECTED WEIGHT CHANGE: 0
NAUSEA: 0

## 2021-10-10 NOTE — ASSESSMENT & PLAN NOTE
GOOD hyperlipidemia control      LDL  74  - goal < 80  CONTINUE CURRENT THERAPY  Recommend low fat diet - Risks and potential complications of hyperlipidemia reviewed  Role of medications,diet, exercise in the treatment of hyperlipidemia discussed   Treatment plan and follow up reviewed and understood by patient

## 2021-10-10 NOTE — ASSESSMENT & PLAN NOTE
FAIR diabetic control.     A1c 7.5 - goal < 7  Current medications reviewed.  Recommend diet modifications and weight loss.   Met with patient to review diet, exercise, and glucometer readings  Reviewed medications and the importance of compliance  Diabetes health maintenance plan and follow up was discussed and understood by patient

## 2021-10-13 LAB
APPEARANCE UR: CLEAR
BACTERIA #/AREA URNS HPF: ABNORMAL /HPF
BACTERIA UR CULT: ABNORMAL
BACTERIA UR CULT: NORMAL
BILIRUB UR QL STRIP: NEGATIVE
COLOR UR: YELLOW
GLUCOSE UR QL STRIP: ABNORMAL
HGB UR QL STRIP: NEGATIVE
HYALINE CASTS #/AREA URNS LPF: ABNORMAL /LPF
KETONES UR QL STRIP: NEGATIVE
LEUKOCYTE ESTERASE UR QL STRIP: ABNORMAL
NITRITE UR QL STRIP: NEGATIVE
PH UR STRIP: 6 [PH] (ref 5–8)
PROT UR QL STRIP: NEGATIVE
RBC #/AREA URNS HPF: ABNORMAL /HPF
SP GR UR STRIP: 1.01 (ref 1–1.03)
SQUAMOUS #/AREA URNS HPF: ABNORMAL /HPF
WBC #/AREA URNS HPF: ABNORMAL /HPF

## 2021-10-26 RX ORDER — ATORVASTATIN CALCIUM 40 MG/1
TABLET, FILM COATED ORAL
Qty: 90 TABLET | Refills: 1 | Status: SHIPPED | OUTPATIENT
Start: 2021-10-26 | End: 2022-02-02 | Stop reason: SDUPTHER

## 2021-10-26 RX ORDER — POTASSIUM CHLORIDE 750 MG/1
TABLET, EXTENDED RELEASE ORAL
Qty: 90 TABLET | Refills: 1 | Status: SHIPPED | OUTPATIENT
Start: 2021-10-26 | End: 2022-07-14

## 2021-11-09 ENCOUNTER — OFFICE VISIT (OUTPATIENT)
Dept: FAMILY MEDICINE | Facility: CLINIC | Age: 86
End: 2021-11-09
Payer: MEDICARE

## 2021-11-09 VITALS
DIASTOLIC BLOOD PRESSURE: 70 MMHG | BODY MASS INDEX: 38.44 KG/M2 | OXYGEN SATURATION: 97 % | HEIGHT: 60 IN | WEIGHT: 195.8 LBS | SYSTOLIC BLOOD PRESSURE: 112 MMHG | TEMPERATURE: 97.8 F | HEART RATE: 63 BPM | RESPIRATION RATE: 16 BRPM

## 2021-11-09 DIAGNOSIS — N39.0 E. COLI URINARY TRACT INFECTION: ICD-10-CM

## 2021-11-09 DIAGNOSIS — E16.2 HYPOGLYCEMIA: Primary | ICD-10-CM

## 2021-11-09 DIAGNOSIS — B96.20 E. COLI URINARY TRACT INFECTION: ICD-10-CM

## 2021-11-09 DIAGNOSIS — E11.9 TYPE 2 DIABETES MELLITUS WITHOUT COMPLICATION, WITHOUT LONG-TERM CURRENT USE OF INSULIN (CMS/HCC): ICD-10-CM

## 2021-11-09 PROCEDURE — 99495 TRANSJ CARE MGMT MOD F2F 14D: CPT | Performed by: FAMILY MEDICINE

## 2021-11-09 RX ORDER — NITROFURANTOIN 25; 75 MG/1; MG/1
100 CAPSULE ORAL 2 TIMES DAILY
Qty: 10 CAPSULE | Refills: 0 | Status: SHIPPED | OUTPATIENT
Start: 2021-11-09 | End: 2021-11-14

## 2021-11-09 RX ORDER — NITROFURANTOIN MACROCRYSTALS 50 MG/1
50 CAPSULE ORAL NIGHTLY
Qty: 30 CAPSULE | Refills: 0 | Status: SHIPPED | OUTPATIENT
Start: 2021-11-09 | End: 2021-11-19

## 2021-11-09 NOTE — PATIENT INSTRUCTIONS
DECREASE METFORMIN TO ONE DAILY    ANTIBIOTIC TWICE DAILY X 5 DAILY THEN ONE DAILY X 30 DAYS    DECREASE GABAPENTIN TO 1 AM AND 2 AT BEDTIME    CALL 1 MONTH WITH JIN

## 2021-11-09 NOTE — PROGRESS NOTES
Subjective      Patient ID: Thelma Gomes is a 86 y.o. female.    HPI    Facility/Provider transitioning from: [facility]  Was the patient treated and released from Er? NO  Was the patient admitted to hospital? ES  Date of discharge?  YES  Was the patient contacted within 2 business days of discharge?  YES  New meds? NO  Discontinued meds? Dose metformin decreased    Pt hospitalized with mental status changes - found by EMS to have blood sugar 56 on arrival to home - daughter relates the evening prior pt had pasta and birthday cake - responded to fluids - dosage DM meds changed and pt discharged in stable condtion  POST discharge positive urine C&S for ecoli reported  Home glucose higher than previous    ALL LABS/ XRAYS/CONSULTS [place] ADMISSION REVIEWED AND DISCUSSED WITH PT AND DAUGHTER  The following have been reviewed and updated as appropriate in this visit:  Allergies  Meds  Problems       Review of Systems   Constitutional: Positive for fatigue. Negative for chills and fever.   Respiratory: Negative for shortness of breath.    Cardiovascular: Negative for chest pain, palpitations and leg swelling.   Gastrointestinal: Negative for abdominal pain, diarrhea and vomiting.   Genitourinary: Positive for frequency. Negative for dysuria.   Neurological: Negative for dizziness, speech difficulty and light-headedness.       Current Outpatient Medications   Medication Sig Dispense Refill   • aspirin 81 mg enteric coated tablet Take 81 mg by mouth daily.     • atorvastatin 40 mg tablet TAKE 1 Tablet BY MOUTH EVERY DAY 90 tablet 1   • blood sugar diagnostic ( BLOOD GLUCOSE TEST STRIP) strip TEST EVERY  strip 1   • cholecalciferol, vitamin D3, (VITAMIN D3) 100 mcg (4,000 unit) capsule       • clopidogreL (PLAVIX) 75 mg tablet Take 75 mg by mouth daily.     • colchicine (COLCRYS) 0.6 mg tablet Take 1 tablet (0.6 mg total) by mouth once daily. 90 tablet 0   • freestyle 28 gauge lancets Test sugar once  daily. Dx: E11.9 100 each 1   • FREESTYLE LITE METER kit Test sugar once daily. Dx: E11.9 1 each 0   • furosemide (LASIX) 40 mg tablet Take 1 tablet (40 mg total) by mouth daily. 90 tablet 1   • gabapentin (NEURONTIN) 100 mg capsule TAKE TWO CAPSULES BY MOUTH EVERY MORNING AND TAKE THREE CAPSULES BY MOUTH EVERY EVENING 150 capsule 3   • losartan (COZAAR) 100 mg tablet TAKE 1 Tablet BY MOUTH EVERY DAY 30 tablet 3   • metFORMIN XR (GLUCOPHAGE-XR) 500 mg 24 hr tablet Take 1 tablet (500 mg total) by mouth 2 (two) times a day. (Patient taking differently: Take 500 mg by mouth daily with breakfast.  ) 180 tablet 1   • pantoprazole (PROTONIX) 40 mg EC tablet Take 1 tablet (40 mg total) by mouth once daily. 90 tablet 1   • potassium chloride 10 mEq CR tablet TAKE 1 Tablet BY MOUTH EVERY DAY 90 tablet 1   • ketoconazole 2 % cream Apply topically daily. 30 g 1   • nitrofurantoin (MACRODANTIN) 50 mg capsule Take 1 capsule (50 mg total) by mouth nightly for 10 days. 30 capsule 0   • nitrofurantoin, macrocrystal-monohydrate, 100 mg capsule Take 1 capsule (100 mg total) by mouth 2 (two) times a day for 5 days. 10 capsule 0   • tamsulosin (FLOMAX) 0.4 mg capsule Take 1 capsule (0.4 mg total) by mouth once daily. 90 capsule 1     No current facility-administered medications for this visit.     History reviewed. No pertinent past medical history.  Family History   Problem Relation Age of Onset   • Diabetes Biological Mother    • Breast cancer Biological Sister    • Hypertension Biological Sister    • Stroke Biological Sister    • Lung cancer Biological Brother      Past Surgical History:   Procedure Laterality Date   •  SECTION     • CORONARY STENT PLACEMENT       Allergies   Allergen Reactions   • Cephalexin Monohydrate    • Cortisone Acetate    • Morphine      Other reaction(s): Rash     Social History     Socioeconomic History   • Marital status:      Spouse name: Not on file   • Number of children: Not on file   •  Years of education: Not on file   • Highest education level: Not on file   Occupational History   • Not on file   Tobacco Use   • Smoking status: Former Smoker   • Smokeless tobacco: Never Used   Substance and Sexual Activity   • Alcohol use: No   • Drug use: Not on file   • Sexual activity: Defer   Other Topics Concern   • Not on file   Social History Narrative   • Not on file     Social Determinants of Health     Financial Resource Strain:    • Difficulty of Paying Living Expenses: Not on file   Food Insecurity:    • Worried About Running Out of Food in the Last Year: Not on file   • Ran Out of Food in the Last Year: Not on file   Transportation Needs:    • Lack of Transportation (Medical): Not on file   • Lack of Transportation (Non-Medical): Not on file   Physical Activity:    • Days of Exercise per Week: Not on file   • Minutes of Exercise per Session: Not on file   Stress:    • Feeling of Stress : Not on file   Social Connections:    • Frequency of Communication with Friends and Family: Not on file   • Frequency of Social Gatherings with Friends and Family: Not on file   • Attends [employer] Services: Not on file   • Active Member of Clubs or Organizations: Not on file   • Attends Club or Organization Meetings: Not on file   • Marital Status: Not on file   Intimate Partner Violence:    • Fear of Current or Ex-Partner: Not on file   • Emotionally Abused: Not on file   • Physically Abused: Not on file   • Sexually Abused: Not on file   Housing Stability:    • Unable to Pay for Housing in the Last Year: Not on file   • Number of Places Lived in the Last Year: Not on file   • Unstable Housing in the Last Year: Not on file     Vitals:    11/09/21 1133 11/09/21 1159   BP: 130/76 112/70   BP Location: Left upper arm    Patient Position: Sitting    Pulse: 63    Resp: 16    Temp: 36.6 °C (97.8 °F)    TempSrc: Oral    SpO2: 97%    Weight: 88.8 kg (195 lb 12.8 oz)    Height: 1.524 m (5')        Objective     Physical  Exam  Constitutional:       Appearance: She is ill-appearing (chronic).   Neck:      Thyroid: No thyromegaly.      Vascular: Carotid bruit present.   Cardiovascular:      Rate and Rhythm: Normal rate and regular rhythm.   Pulmonary:      Effort: Pulmonary effort is normal.      Breath sounds: Normal breath sounds.   Abdominal:      General: Bowel sounds are normal.      Palpations: Abdomen is soft.      Tenderness: There is no abdominal tenderness. There is no right CVA tenderness or left CVA tenderness.   Skin:     Comments: Chronic venous insuff    LE wrapped   Neurological:      Mental Status: She is alert and oriented to person, place, and time.   Psychiatric:         Mood and Affect: Mood normal.         Speech: Speech normal.         Behavior: Behavior normal.         Assessment/Plan   Problem List Items Addressed This Visit        Endocrine/Metabolic    Type 2 diabetes mellitus without complication, without long-term current use of insulin (CMS/[facility])     FAIR diabetic control.     A1c 7.5 - goal < 7  Current medications reviewed.  Recommend diet modifications and weight loss.   Met with patient to review diet, exercise, and glucometer readings  Reviewed medications and the importance of compliance  Diabetes health maintenance plan and follow up was discussed and understood by patient           Other Visit Diagnoses     Hypoglycemia    -  Primary    counseled re cause of low blood sugar unclear   close follow     E. coli urinary tract infection        acute RX then suppressive daoly RX    Relevant Medications    nitrofurantoin, macrocrystal-monohydrate, 100 mg capsule    nitrofurantoin (MACRODANTIN) 50 mg capsule          Traci Katz DO  11/14/2021

## 2021-11-14 ASSESSMENT — ENCOUNTER SYMPTOMS
SPEECH DIFFICULTY: 0
ABDOMINAL PAIN: 0
FEVER: 0
PALPITATIONS: 0
DYSURIA: 0
LIGHT-HEADEDNESS: 0
VOMITING: 0
FREQUENCY: 1
SHORTNESS OF BREATH: 0
CHILLS: 0
FATIGUE: 1
DIZZINESS: 0
DIARRHEA: 0

## 2021-12-13 ENCOUNTER — TELEPHONE (OUTPATIENT)
Dept: FAMILY MEDICINE | Facility: CLINIC | Age: 86
End: 2021-12-13
Payer: MEDICARE

## 2021-12-13 DIAGNOSIS — E11.9 TYPE 2 DIABETES MELLITUS WITHOUT COMPLICATION, WITHOUT LONG-TERM CURRENT USE OF INSULIN (CMS/HCC): ICD-10-CM

## 2021-12-13 RX ORDER — BLOOD-GLUCOSE CONTROL, NORMAL
1 EACH MISCELLANEOUS 2 TIMES DAILY
Qty: 200 STRIP | Refills: 1 | Status: SHIPPED | OUTPATIENT
Start: 2021-12-13 | End: 2022-11-26

## 2021-12-13 RX ORDER — BLOOD-GLUCOSE CONTROL, NORMAL
EACH MISCELLANEOUS
Qty: 100 STRIP | Refills: 1 | Status: SHIPPED | OUTPATIENT
Start: 2021-12-13 | End: 2021-12-13 | Stop reason: SDUPTHER

## 2021-12-13 NOTE — TELEPHONE ENCOUNTER
Thelma is testing her sugars twice daily. Needs new script with new directions.    Medicine Refill Request    Last Office Visit: 11/9/2021  Last Telemedicine Visit: Visit date not found    Next Office Visit: Visit date not found  Next Telemedicine Visit: Visit date not found         Current Outpatient Medications:   •  aspirin 81 mg enteric coated tablet, Take 81 mg by mouth daily., Disp: , Rfl:   •  atorvastatin 40 mg tablet, TAKE 1 Tablet BY MOUTH EVERY DAY, Disp: 90 tablet, Rfl: 1  •  blood sugar diagnostic ( BLOOD GLUCOSE TEST STRIP) strip, TEST EVERY DAY, Disp: 100 strip, Rfl: 1  •  cholecalciferol, vitamin D3, (VITAMIN D3) 100 mcg (4,000 unit) capsule,  , Disp: , Rfl:   •  clopidogreL (PLAVIX) 75 mg tablet, Take 75 mg by mouth daily., Disp: , Rfl:   •  colchicine (COLCRYS) 0.6 mg tablet, Take 1 tablet (0.6 mg total) by mouth once daily., Disp: 90 tablet, Rfl: 0  •  freestyle 28 gauge lancets, Test sugar once daily. Dx: E11.9, Disp: 100 each, Rfl: 1  •  FREESTYLE LITE METER kit, Test sugar once daily. Dx: E11.9, Disp: 1 each, Rfl: 0  •  furosemide (LASIX) 40 mg tablet, Take 1 tablet (40 mg total) by mouth daily., Disp: 90 tablet, Rfl: 1  •  gabapentin (NEURONTIN) 100 mg capsule, TAKE TWO CAPSULES BY MOUTH EVERY MORNING AND TAKE THREE CAPSULES BY MOUTH EVERY EVENING, Disp: 150 capsule, Rfl: 3  •  ketoconazole 2 % cream, Apply topically daily., Disp: 30 g, Rfl: 1  •  losartan (COZAAR) 100 mg tablet, TAKE 1 Tablet BY MOUTH EVERY DAY, Disp: 30 tablet, Rfl: 3  •  metFORMIN XR (GLUCOPHAGE-XR) 500 mg 24 hr tablet, Take 1 tablet (500 mg total) by mouth 2 (two) times a day. (Patient taking differently: Take 500 mg by mouth daily with breakfast.  ), Disp: 180 tablet, Rfl: 1  •  pantoprazole (PROTONIX) 40 mg EC tablet, Take 1 tablet (40 mg total) by mouth once daily., Disp: 90 tablet, Rfl: 1  •  potassium chloride 10 mEq CR tablet, TAKE 1 Tablet BY MOUTH EVERY DAY, Disp: 90 tablet, Rfl: 1  •  tamsulosin (FLOMAX) 0.4  mg capsule, Take 1 capsule (0.4 mg total) by mouth once daily., Disp: 90 capsule, Rfl: 1      BP Readings from Last 3 Encounters:   11/09/21 112/70   10/06/21 132/84   07/07/21 118/68       Recent Lab results:  Lab Results   Component Value Date    CHOL 165 10/05/2021   ,   Lab Results   Component Value Date    HDL 63 10/05/2021   ,   Lab Results   Component Value Date    LDLCALC 74 10/05/2021   ,   Lab Results   Component Value Date    TRIG 181 (H) 10/05/2021        Lab Results   Component Value Date    GLUCOSE 2+ (A) 10/08/2021   ,   Lab Results   Component Value Date    HGBA1C 7.5 (H) 10/05/2021         Lab Results   Component Value Date    CREATININE 1.04 (H) 10/05/2021       Lab Results   Component Value Date    TSH 2.15 03/15/2019

## 2021-12-13 NOTE — TELEPHONE ENCOUNTER
Medicine Refill Request    Last Office Visit: 11/9/2021  Last Telemedicine Visit: Visit date not found    Next Office Visit: Visit date not found  Next Telemedicine Visit: Visit date not found         Current Outpatient Medications:   •  aspirin 81 mg enteric coated tablet, Take 81 mg by mouth daily., Disp: , Rfl:   •  atorvastatin 40 mg tablet, TAKE 1 Tablet BY MOUTH EVERY DAY, Disp: 90 tablet, Rfl: 1  •  blood sugar diagnostic ( BLOOD GLUCOSE TEST STRIP) strip, TEST EVERY DAY, Disp: 100 strip, Rfl: 1  •  cholecalciferol, vitamin D3, (VITAMIN D3) 100 mcg (4,000 unit) capsule,  , Disp: , Rfl:   •  clopidogreL (PLAVIX) 75 mg tablet, Take 75 mg by mouth daily., Disp: , Rfl:   •  colchicine (COLCRYS) 0.6 mg tablet, Take 1 tablet (0.6 mg total) by mouth once daily., Disp: 90 tablet, Rfl: 0  •  freestyle 28 gauge lancets, Test sugar once daily. Dx: E11.9, Disp: 100 each, Rfl: 1  •  FREESTYLE LITE METER kit, Test sugar once daily. Dx: E11.9, Disp: 1 each, Rfl: 0  •  furosemide (LASIX) 40 mg tablet, Take 1 tablet (40 mg total) by mouth daily., Disp: 90 tablet, Rfl: 1  •  gabapentin (NEURONTIN) 100 mg capsule, TAKE TWO CAPSULES BY MOUTH EVERY MORNING AND TAKE THREE CAPSULES BY MOUTH EVERY EVENING, Disp: 150 capsule, Rfl: 3  •  ketoconazole 2 % cream, Apply topically daily., Disp: 30 g, Rfl: 1  •  losartan (COZAAR) 100 mg tablet, TAKE 1 Tablet BY MOUTH EVERY DAY, Disp: 30 tablet, Rfl: 3  •  metFORMIN XR (GLUCOPHAGE-XR) 500 mg 24 hr tablet, Take 1 tablet (500 mg total) by mouth 2 (two) times a day. (Patient taking differently: Take 500 mg by mouth daily with breakfast.  ), Disp: 180 tablet, Rfl: 1  •  pantoprazole (PROTONIX) 40 mg EC tablet, Take 1 tablet (40 mg total) by mouth once daily., Disp: 90 tablet, Rfl: 1  •  potassium chloride 10 mEq CR tablet, TAKE 1 Tablet BY MOUTH EVERY DAY, Disp: 90 tablet, Rfl: 1  •  tamsulosin (FLOMAX) 0.4 mg capsule, Take 1 capsule (0.4 mg total) by mouth once daily., Disp: 90 capsule,  Rfl: 1      BP Readings from Last 3 Encounters:   11/09/21 112/70   10/06/21 132/84   07/07/21 118/68       Recent Lab results:  Lab Results   Component Value Date    CHOL 165 10/05/2021   ,   Lab Results   Component Value Date    HDL 63 10/05/2021   ,   Lab Results   Component Value Date    LDLCALC 74 10/05/2021   ,   Lab Results   Component Value Date    TRIG 181 (H) 10/05/2021        Lab Results   Component Value Date    GLUCOSE 2+ (A) 10/08/2021   ,   Lab Results   Component Value Date    HGBA1C 7.5 (H) 10/05/2021         Lab Results   Component Value Date    CREATININE 1.04 (H) 10/05/2021       Lab Results   Component Value Date    TSH 2.15 03/15/2019

## 2021-12-13 NOTE — TELEPHONE ENCOUNTER
Pt's daughter requesting call from physician to discuss patient's glucose levels any day afternoon non urgent - [facility]  - daughter stated that sugars have been about 200s but was asked to update in a month from last visit

## 2021-12-16 NOTE — TELEPHONE ENCOUNTER
Daughter called wanting to discuss patient's blood glucose levels - reported higher than normal with last night before dinner being 270s - Questioning whether she should continue to give metformin daily or go back to giving patient metformin BID as previously ordered - [facility]

## 2021-12-16 NOTE — TELEPHONE ENCOUNTER
CALL TO     AM GLU  158 / 181   PREDINNER  [phone]    RESUME BID METFORMIN - CHECK GLU AM/PRE DINNER ALTERNATING   CALL WITH RESPONSE

## 2021-12-27 RX ORDER — LOSARTAN POTASSIUM 100 MG/1
TABLET ORAL
Qty: 30 TABLET | Refills: 3 | Status: SHIPPED | OUTPATIENT
Start: 2021-12-27 | End: 2022-02-02 | Stop reason: SDUPTHER

## 2021-12-28 ENCOUNTER — TELEPHONE (OUTPATIENT)
Dept: FAMILY MEDICINE | Facility: CLINIC | Age: 86
End: 2021-12-28
Payer: MEDICARE

## 2021-12-28 NOTE — TELEPHONE ENCOUNTER
Pt has been taking the Metformin alternating between AM one day then PM the next day and glucose has improved per daughter  AM - 173 , PM - 158  AM - 134 , PM - 175  AM - 135 , PM - 199  AM - 141 , PM - 150  AM - 156 , PM - 172

## 2021-12-30 RX ORDER — KETOCONAZOLE 20 MG/G
CREAM TOPICAL DAILY
Qty: 30 G | Refills: 1 | Status: SHIPPED | OUTPATIENT
Start: 2021-12-30 | End: 2022-04-06 | Stop reason: SDUPTHER

## 2022-01-20 RX ORDER — TAMSULOSIN HYDROCHLORIDE 0.4 MG/1
0.4 CAPSULE ORAL DAILY
Qty: 90 CAPSULE | Refills: 1 | Status: SHIPPED | OUTPATIENT
Start: 2022-01-20 | End: 2022-02-02 | Stop reason: SDUPTHER

## 2022-01-20 RX ORDER — FUROSEMIDE 40 MG/1
40 TABLET ORAL DAILY
Qty: 90 TABLET | Refills: 1 | Status: SHIPPED | OUTPATIENT
Start: 2022-01-20 | End: 2022-02-02 | Stop reason: SDUPTHER

## 2022-01-20 RX ORDER — PANTOPRAZOLE SODIUM 40 MG/1
40 TABLET, DELAYED RELEASE ORAL
Qty: 90 TABLET | Refills: 1 | Status: SHIPPED | OUTPATIENT
Start: 2022-01-20 | End: 2022-07-14

## 2022-02-03 RX ORDER — ATORVASTATIN CALCIUM 40 MG/1
40 TABLET, FILM COATED ORAL
Qty: 90 TABLET | Refills: 1 | Status: SHIPPED | OUTPATIENT
Start: 2022-02-03 | End: 2022-09-18

## 2022-02-03 RX ORDER — FUROSEMIDE 40 MG/1
40 TABLET ORAL DAILY
Qty: 90 TABLET | Refills: 1 | Status: SHIPPED | OUTPATIENT
Start: 2022-02-03 | End: 2022-09-18

## 2022-02-03 RX ORDER — LOSARTAN POTASSIUM 100 MG/1
100 TABLET ORAL
Qty: 90 TABLET | Refills: 1 | Status: SHIPPED | OUTPATIENT
Start: 2022-02-03 | End: 2022-07-12

## 2022-02-03 RX ORDER — TAMSULOSIN HYDROCHLORIDE 0.4 MG/1
0.4 CAPSULE ORAL DAILY
Qty: 90 CAPSULE | Refills: 1 | Status: SHIPPED | OUTPATIENT
Start: 2022-02-03 | End: 2022-09-18

## 2022-02-03 NOTE — TELEPHONE ENCOUNTER
CALL DAUGHTER   MEDS SENT AS REQUESTED  SHOULD HAVE DM FOLLOW UP 3/1/2022 - IF THEY CAN COME EARLY IN THE DAY WITH ELMER DURAND WE WILL DRAW HER LABS HERE - IF NOT WILL SEND HOME LAB DRAW  HELP TO SCHEDULE

## 2022-02-04 RX ORDER — CLOPIDOGREL BISULFATE 75 MG/1
TABLET ORAL
Qty: 30 TABLET | Refills: 3 | Status: SHIPPED | OUTPATIENT
Start: 2022-02-04 | End: 2022-06-06

## 2022-02-18 RX ORDER — GABAPENTIN 100 MG/1
CAPSULE ORAL
Qty: 90 CAPSULE | Refills: 3 | Status: SHIPPED | OUTPATIENT
Start: 2022-02-18 | End: 2022-08-17

## 2022-02-18 NOTE — TELEPHONE ENCOUNTER
Medicine Refill Request    Last Office: 11/9/2021   Last Consult Visit: Visit date not found  Last Telemedicine Visit: Visit date not found    Next Appointment: 3/8/2022      Current Outpatient Medications:   •  clopidogreL (PLAVIX) 75 mg tablet, TAKE ONE TABLET BY MOUTH EVERY DAY, Disp: 30 tablet, Rfl: 3  •  aspirin 81 mg enteric coated tablet, Take 81 mg by mouth daily., Disp: , Rfl:   •  atorvastatin (LIPITOR) 40 mg tablet, Take 1 tablet (40 mg total) by mouth once daily., Disp: 90 tablet, Rfl: 1  •  blood sugar diagnostic ( BLOOD GLUCOSE TEST STRIP) strip, Inject 1 strip under the skin 2 (two) times a day. TEST EVERY DAY, Disp: 200 strip, Rfl: 1  •  cholecalciferol, vitamin D3, (VITAMIN D3) 100 mcg (4,000 unit) capsule,  , Disp: , Rfl:   •  colchicine (COLCRYS) 0.6 mg tablet, Take 1 tablet (0.6 mg total) by mouth once daily., Disp: 90 tablet, Rfl: 0  •  freestyle 28 gauge lancets, Test sugar once daily. Dx: E11.9, Disp: 100 each, Rfl: 1  •  furosemide (LASIX) 40 mg tablet, Take 1 tablet (40 mg total) by mouth daily., Disp: 90 tablet, Rfl: 1  •  gabapentin (NEURONTIN) 100 mg capsule, TAKE TWO CAPSULES BY MOUTH EVERY MORNING AND TAKE THREE CAPSULES BY MOUTH EVERY EVENING, Disp: 150 capsule, Rfl: 3  •  ketoconazole 2 % cream, Apply topically daily., Disp: 30 g, Rfl: 1  •  losartan (COZAAR) 100 mg tablet, Take 1 tablet (100 mg total) by mouth once daily., Disp: 90 tablet, Rfl: 1  •  metFORMIN XR (GLUCOPHAGE-XR) 500 mg 24 hr tablet, Take 1 tablet (500 mg total) by mouth 2 (two) times a day. (Patient taking differently: Take 500 mg by mouth daily with breakfast.  ), Disp: 180 tablet, Rfl: 1  •  pantoprazole 40 mg EC tablet, Take 1 tablet (40 mg total) by mouth once daily., Disp: 90 tablet, Rfl: 1  •  potassium chloride 10 mEq CR tablet, TAKE 1 Tablet BY MOUTH EVERY DAY, Disp: 90 tablet, Rfl: 1  •  tamsulosin (FLOMAX) 0.4 mg capsule, Take 1 capsule (0.4 mg total) by mouth once daily., Disp: 90 capsule, Rfl:  1      BP Readings from Last 3 Encounters:   11/09/21 112/70   10/06/21 132/84   07/07/21 118/68       Recent Lab results:  Lab Results   Component Value Date    CHOL 165 10/05/2021   ,   Lab Results   Component Value Date    HDL 63 10/05/2021   ,   Lab Results   Component Value Date    LDLCALC 74 10/05/2021   ,   Lab Results   Component Value Date    TRIG 181 (H) 10/05/2021        Lab Results   Component Value Date    GLUCOSE 2+ (A) 10/08/2021   ,   Lab Results   Component Value Date    HGBA1C 7.5 (H) 10/05/2021         Lab Results   Component Value Date    CREATININE 1.04 (H) 10/05/2021       Lab Results   Component Value Date    TSH 2.15 03/15/2019

## 2022-03-08 ENCOUNTER — OFFICE VISIT (OUTPATIENT)
Dept: FAMILY MEDICINE | Facility: CLINIC | Age: 87
End: 2022-03-08
Payer: MEDICARE

## 2022-03-08 VITALS
WEIGHT: 200 LBS | HEIGHT: 60 IN | RESPIRATION RATE: 16 BRPM | BODY MASS INDEX: 39.27 KG/M2 | OXYGEN SATURATION: 97 % | TEMPERATURE: 97.7 F | DIASTOLIC BLOOD PRESSURE: 70 MMHG | SYSTOLIC BLOOD PRESSURE: 118 MMHG | HEART RATE: 74 BPM

## 2022-03-08 DIAGNOSIS — I10 ESSENTIAL HYPERTENSION: ICD-10-CM

## 2022-03-08 DIAGNOSIS — E11.9 TYPE 2 DIABETES MELLITUS WITHOUT COMPLICATION, WITHOUT LONG-TERM CURRENT USE OF INSULIN (CMS/HCC): Primary | ICD-10-CM

## 2022-03-08 DIAGNOSIS — M81.0 AGE-RELATED OSTEOPOROSIS WITHOUT CURRENT PATHOLOGICAL FRACTURE: ICD-10-CM

## 2022-03-08 DIAGNOSIS — K21.9 GASTROESOPHAGEAL REFLUX DISEASE WITHOUT ESOPHAGITIS: ICD-10-CM

## 2022-03-08 DIAGNOSIS — E78.2 MIXED HYPERLIPIDEMIA: ICD-10-CM

## 2022-03-08 PROCEDURE — 99214 OFFICE O/P EST MOD 30 MIN: CPT | Performed by: FAMILY MEDICINE

## 2022-03-08 NOTE — PROGRESS NOTES
Subjective      Patient ID: Thelma Gomes is a 86 y.o. female.    HPI     Medical management of diabetes, hypertension, hyperlipidemia  Pre visit care team ting performed  A report card has been prepared for today's visit  WALKING MORE !!  - no back/leg pain / ? Wean gabapentin  3D PUZZLES AT HOME  AM glcose 134    The following have been reviewed and updated as appropriate in this visit:        Review of Systems   Constitutional: Negative for chills, fatigue and unexpected weight change.   Eyes: Positive for visual disturbance.   Respiratory: Negative for cough and shortness of breath.    Cardiovascular: Negative for chest pain, palpitations and leg swelling.   Gastrointestinal: Negative for abdominal pain, blood in stool, constipation, diarrhea and nausea.   Endocrine: Negative for cold intolerance and heat intolerance.   Genitourinary: Negative for dysuria, frequency, hematuria and urgency.   Musculoskeletal: Positive for gait problem. Negative for arthralgias.   Skin: Positive for wound (bilat LE). Negative for color change.   Neurological: Negative for dizziness, numbness and headaches.   Hematological: Negative for adenopathy. Does not bruise/bleed easily.   Psychiatric/Behavioral: Negative for sleep disturbance.       Current Outpatient Medications   Medication Sig Dispense Refill   • aspirin 81 mg enteric coated tablet Take 81 mg by mouth daily.     • atorvastatin (LIPITOR) 40 mg tablet Take 1 tablet (40 mg total) by mouth once daily. 90 tablet 1   • blood sugar diagnostic ( BLOOD GLUCOSE TEST STRIP) strip Inject 1 strip under the skin 2 (two) times a day. TEST EVERY  strip 1   • cholecalciferol, vitamin D3, (VITAMIN D3) 100 mcg (4,000 unit) capsule       • clopidogreL (PLAVIX) 75 mg tablet TAKE ONE TABLET BY MOUTH EVERY DAY 30 tablet 3   • colchicine (COLCRYS) 0.6 mg tablet Take 1 tablet (0.6 mg total) by mouth once daily. 90 tablet 0   • freestyle 28 gauge lancets Test sugar once daily. Dx:  E11.9 100 each 1   • furosemide (LASIX) 40 mg tablet Take 1 tablet (40 mg total) by mouth daily. 90 tablet 1   • gabapentin (NEURONTIN) 100 mg capsule TAKE ONE CAPSULE BY MOUTH EVERY MORNING AND TAKE TWO CAPSULES BY MOUTH EVERY EVENING 90 capsule 3   • ketoconazole 2 % cream Apply topically daily. 30 g 1   • losartan (COZAAR) 100 mg tablet Take 1 tablet (100 mg total) by mouth once daily. 90 tablet 1   • metFORMIN XR (GLUCOPHAGE-XR) 500 mg 24 hr tablet Take 1 tablet (500 mg total) by mouth 2 (two) times a day. (Patient taking differently: Take 500 mg by mouth daily with breakfast.  ) 180 tablet 1   • pantoprazole 40 mg EC tablet Take 1 tablet (40 mg total) by mouth once daily. 90 tablet 1   • potassium chloride 10 mEq CR tablet TAKE 1 Tablet BY MOUTH EVERY DAY 90 tablet 1   • tamsulosin (FLOMAX) 0.4 mg capsule Take 1 capsule (0.4 mg total) by mouth once daily. 90 capsule 1   • triamcinolone (KENALOG) 0.1 % cream APPLY TO THE AFFECTED AREA(S) TOPICALLY TWICE DAILY FOR ONE WEEK       No current facility-administered medications for this visit.     History reviewed. No pertinent past medical history.  Family History   Problem Relation Age of Onset   • Diabetes Biological Mother    • Breast cancer Biological Sister    • Hypertension Biological Sister    • Stroke Biological Sister    • Lung cancer Biological Brother      Past Surgical History:   Procedure Laterality Date   •  SECTION     • CORONARY STENT PLACEMENT       Allergies   Allergen Reactions   • Cephalexin Monohydrate    • Cortisone Acetate    • Morphine      Other reaction(s): Rash     Social History     Socioeconomic History   • Marital status:      Spouse name: Not on file   • Number of children: Not on file   • Years of education: Not on file   • Highest education level: Not on file   Occupational History   • Not on file   Tobacco Use   • Smoking status: Former Smoker   • Smokeless tobacco: Never Used   Substance and Sexual Activity   •  Alcohol use: No   • Drug use: Not on file   • Sexual activity: Defer   Other Topics Concern   • Not on file   Social History Narrative   • Not on file     Social Determinants of Health     Financial Resource Strain: Not on file   Food Insecurity: Not on file   Transportation Needs: Not on file   Physical Activity: Not on file   Stress: Not on file   Social Connections: Not on file   Intimate Partner Violence: Not on file   Housing Stability: Not on file     Vitals:    03/08/22 1026 03/08/22 1044   BP: 120/78 118/70   BP Location: Left upper arm    Patient Position: Sitting    Pulse: 74    Resp: 16    Temp: 36.5 °C (97.7 °F)    TempSrc: Oral    SpO2: 97%    Weight: 90.7 kg (200 lb)    Height: 1.524 m (5')        Objective     Physical Exam  Constitutional:       Appearance: Normal appearance. She is well-developed.   Eyes:      Pupils: Pupils are equal, round, and reactive to light.      Funduscopic exam:     Right eye: No AV nicking.         Left eye: No AV nicking.   Neck:      Thyroid: No thyromegaly.      Vascular: Carotid bruit present.   Cardiovascular:      Rate and Rhythm: Normal rate and regular rhythm.      Pulses: Normal pulses.           Dorsalis pedis pulses are 2+ on the right side and 2+ on the left side.        Posterior tibial pulses are 2+ on the right side and 2+ on the left side.      Heart sounds: Murmur heard.    Systolic murmur is present with a grade of 2/6.  Pulmonary:      Effort: Pulmonary effort is normal.      Breath sounds: Normal breath sounds.   Abdominal:      General: Bowel sounds are normal.      Palpations: Abdomen is soft. There is no hepatomegaly or splenomegaly.      Tenderness: There is no abdominal tenderness.   Musculoskeletal:      Right lower leg: No edema.      Left lower leg: No edema.   Skin:     General: Skin is warm and dry.      Findings: No rash.      Comments: bilat legs with unna boot in place   Neurological:      Mental Status: She is alert and oriented to person,  place, and time.      Sensory: No sensory deficit.   Psychiatric:         Mood and Affect: Mood normal.         Speech: Speech normal.         Behavior: Behavior normal.         Assessment/Plan   Problem List Items Addressed This Visit        Circulatory    Essential hypertension     Hypertension well controlled    /70 - goal < 130/80  Recommend regular exercise and low salt diet  Risk and potential complications of hypertension discussed  Importance of medication compliance and regular follow up emphasized  Role of medication/diet/exercise in treating hypertension discussed  Treatment plan and follow up reviewed and understood by patient         Relevant Orders    Comprehensive metabolic panel (Completed)       Digestive    Gastroesophageal reflux disease without esophagitis     Patient was counselled regarding triggers for symptoms - avoid caffeine/spicey foods/NO smoking  Elevate HOB 30 degrees - DO NOT lie flat for at least 30 minutes after eating  Take medications as directed            Endocrine/Metabolic    Type 2 diabetes mellitus without complication, without long-term current use of insulin (CMS/[facility]) - Primary     UNKNOWN diabetic control.       Previous A1c 7.5 - goal < 7  Current medications reviewed.  Recommend diet modifications and weight loss.   Met with patient to review diet, exercise, and glucometer readings  Reviewed medications and the importance of compliance  Diabetes health maintenance plan and follow up was discussed and understood by patient         Relevant Orders    Microalbumin/Creatinine Ur Random (Completed)    CBC and Differential (Completed)    Hemoglobin A1c (Completed)    Mixed hyperlipidemia     GOOD hyperlipidemia control      LDL  74  - goal < 80  CONTINUE CURRENT THERAPY  Recommend low fat diet - Risks and potential complications of hyperlipidemia reviewed  Role of medications,diet, exercise in the treatment of hyperlipidemia discussed   Treatment plan and follow up  reviewed and understood by patient         Relevant Orders    Lipid panel (Completed)          Traci Katz DO  3/13/2022

## 2022-03-09 LAB
ALBUMIN/CREAT UR: 3 MCG/MG CREAT
CREAT UR-MCNC: 72 MG/DL (ref 20–275)
MICROALBUMIN UR-MCNC: 0.2 MG/DL

## 2022-03-12 LAB
ALBUMIN SERPL-MCNC: 3.7 G/DL (ref 3.6–5.1)
ALBUMIN/GLOB SERPL: 1.5 (CALC) (ref 1–2.5)
ALP SERPL-CCNC: 87 U/L (ref 37–153)
ALT SERPL-CCNC: 9 U/L (ref 6–29)
AST SERPL-CCNC: 18 U/L (ref 10–35)
BASOPHILS # BLD AUTO: 20 CELLS/UL (ref 0–200)
BASOPHILS NFR BLD AUTO: 0.3 %
BILIRUB SERPL-MCNC: 0.5 MG/DL (ref 0.2–1.2)
BUN SERPL-MCNC: 19 MG/DL (ref 7–25)
BUN/CREAT SERPL: 20 (CALC) (ref 6–22)
CALCIUM SERPL-MCNC: 9.1 MG/DL (ref 8.6–10.4)
CHLORIDE SERPL-SCNC: 102 MMOL/L (ref 98–110)
CHOLEST SERPL-MCNC: 155 MG/DL
CHOLEST/HDLC SERPL: 2.7 (CALC)
CO2 SERPL-SCNC: 31 MMOL/L (ref 20–32)
CREAT SERPL-MCNC: 0.95 MG/DL (ref 0.6–0.88)
EOSINOPHIL # BLD AUTO: 147 CELLS/UL (ref 15–500)
EOSINOPHIL NFR BLD AUTO: 2.2 %
ERYTHROCYTE [DISTWIDTH] IN BLOOD BY AUTOMATED COUNT: 13.1 % (ref 11–15)
GLOBULIN SER CALC-MCNC: 2.4 G/DL (CALC) (ref 1.9–3.7)
GLUCOSE SERPL-MCNC: 128 MG/DL (ref 65–99)
HBA1C MFR BLD: 8.6 % OF TOTAL HGB
HCT VFR BLD AUTO: 42.6 % (ref 35–45)
HDLC SERPL-MCNC: 57 MG/DL
HGB BLD-MCNC: 13.8 G/DL (ref 11.7–15.5)
LDLC SERPL CALC-MCNC: 72 MG/DL (CALC)
LYMPHOCYTES # BLD AUTO: 2653 CELLS/UL (ref 850–3900)
LYMPHOCYTES NFR BLD AUTO: 39.6 %
MCH RBC QN AUTO: 29.2 PG (ref 27–33)
MCHC RBC AUTO-ENTMCNC: 32.4 G/DL (ref 32–36)
MCV RBC AUTO: 90.3 FL (ref 80–100)
MONOCYTES # BLD AUTO: 523 CELLS/UL (ref 200–950)
MONOCYTES NFR BLD AUTO: 7.8 %
NEUTROPHILS # BLD AUTO: 3357 CELLS/UL (ref 1500–7800)
NEUTROPHILS NFR BLD AUTO: 50.1 %
NONHDLC SERPL-MCNC: 98 MG/DL (CALC)
PLATELET # BLD AUTO: 263 THOUSAND/UL (ref 140–400)
PMV BLD REES-ECKER: 11 FL (ref 7.5–12.5)
POTASSIUM SERPL-SCNC: 4.4 MMOL/L (ref 3.5–5.3)
PROT SERPL-MCNC: 6.1 G/DL (ref 6.1–8.1)
QUEST EGFR AFRICAN AMERICAN: 63 ML/MIN/1.73M2
QUEST EGFR NON-AFR. AMERICAN: 55 ML/MIN/1.73M2
RBC # BLD AUTO: 4.72 MILLION/UL (ref 3.8–5.1)
SODIUM SERPL-SCNC: 142 MMOL/L (ref 135–146)
TRIGL SERPL-MCNC: 180 MG/DL
WBC # BLD AUTO: 6.7 THOUSAND/UL (ref 3.8–10.8)

## 2022-03-13 RX ORDER — TRIAMCINOLONE ACETONIDE 1 MG/G
CREAM TOPICAL
COMMUNITY
Start: 2021-12-09 | End: 2022-06-01 | Stop reason: SDUPTHER

## 2022-03-13 ASSESSMENT — ENCOUNTER SYMPTOMS
FATIGUE: 0
ADENOPATHY: 0
DYSURIA: 0
NUMBNESS: 0
DIZZINESS: 0
DIARRHEA: 0
SHORTNESS OF BREATH: 0
NAUSEA: 0
UNEXPECTED WEIGHT CHANGE: 0
PALPITATIONS: 0
FREQUENCY: 0
SLEEP DISTURBANCE: 0
HEADACHES: 0
BLOOD IN STOOL: 0
COLOR CHANGE: 0
ABDOMINAL PAIN: 0
HEMATURIA: 0
ARTHRALGIAS: 0
COUGH: 0
BRUISES/BLEEDS EASILY: 0
CONSTIPATION: 0
WOUND: 1
CHILLS: 0

## 2022-03-13 NOTE — ASSESSMENT & PLAN NOTE
UNKNOWN diabetic control.       Previous A1c 7.5 - goal < 7  Current medications reviewed.  Recommend diet modifications and weight loss.   Met with patient to review diet, exercise, and glucometer readings  Reviewed medications and the importance of compliance  Diabetes health maintenance plan and follow up was discussed and understood by patient

## 2022-03-13 NOTE — ASSESSMENT & PLAN NOTE
Hypertension well controlled    /70 - goal < 130/80  Recommend regular exercise and low salt diet  Risk and potential complications of hypertension discussed  Importance of medication compliance and regular follow up emphasized  Role of medication/diet/exercise in treating hypertension discussed  Treatment plan and follow up reviewed and understood by patient

## 2022-03-28 DIAGNOSIS — E11.9 TYPE 2 DIABETES MELLITUS WITHOUT COMPLICATION, WITHOUT LONG-TERM CURRENT USE OF INSULIN (CMS/HCC): Primary | ICD-10-CM

## 2022-03-28 NOTE — TELEPHONE ENCOUNTER
Spoke with patient's daughter regarding results and physician recommendations    Agreeable to start Amaryl & rechecking A1c 3 mo - also requesting refill Metformin (taking BID)

## 2022-03-28 NOTE — TELEPHONE ENCOUNTER
----- Message from Traci Katz DO sent at 3/27/2022  4:22 PM EDT -----  CALL DAUGHTER   NO ANEMIA - KIDNEYS/LIVER NORMAL  CHOLESTEROL AT GOAL  - LDL 72  BLOOD SUGAR 128/ A1c 8.6 - GOAL < 7        NEED TO ADD ANOTHER AGENT - AMARYL 2MG DAILY - WILL HELP LOWER BLOOD SUGAR   QUE ONCE DAUGHTER INFORMED  ARRANGE FOR HOME DRAW A1c 3 MONTHS

## 2022-03-29 RX ORDER — METFORMIN HYDROCHLORIDE 500 MG/1
500 TABLET, EXTENDED RELEASE ORAL 2 TIMES DAILY
Qty: 180 TABLET | Refills: 1 | Status: SHIPPED | OUTPATIENT
Start: 2022-03-29 | End: 2022-07-03

## 2022-03-29 RX ORDER — GLIMEPIRIDE 2 MG/1
2 TABLET ORAL
Qty: 90 TABLET | Refills: 1 | Status: SHIPPED | OUTPATIENT
Start: 2022-03-29 | End: 2022-07-12

## 2022-04-01 LAB — HBA1C MFR BLD: 8 % OF TOTAL HGB

## 2022-04-06 PROCEDURE — 99999 PR OFFICE/OUTPT VISIT,PROCEDURE ONLY: CPT | Performed by: FAMILY MEDICINE

## 2022-04-06 RX ORDER — KETOCONAZOLE 20 MG/G
CREAM TOPICAL DAILY
Qty: 30 G | Refills: 1 | Status: SHIPPED | OUTPATIENT
Start: 2022-04-06 | End: 2022-05-02 | Stop reason: SDUPTHER

## 2022-05-02 ENCOUNTER — OFFICE VISIT (OUTPATIENT)
Dept: FAMILY MEDICINE | Facility: CLINIC | Age: 87
End: 2022-05-02
Payer: MEDICARE

## 2022-05-02 VITALS
BODY MASS INDEX: 39.66 KG/M2 | HEIGHT: 60 IN | OXYGEN SATURATION: 96 % | TEMPERATURE: 97.4 F | RESPIRATION RATE: 16 BRPM | SYSTOLIC BLOOD PRESSURE: 122 MMHG | DIASTOLIC BLOOD PRESSURE: 78 MMHG | WEIGHT: 202 LBS | HEART RATE: 82 BPM

## 2022-05-02 DIAGNOSIS — E11.9 TYPE 2 DIABETES MELLITUS WITHOUT COMPLICATION, WITHOUT LONG-TERM CURRENT USE OF INSULIN (CMS/HCC): ICD-10-CM

## 2022-05-02 DIAGNOSIS — I50.22 CHRONIC SYSTOLIC CONGESTIVE HEART FAILURE (CMS/HCC): ICD-10-CM

## 2022-05-02 DIAGNOSIS — I73.9 PVD (PERIPHERAL VASCULAR DISEASE) (CMS/HCC): ICD-10-CM

## 2022-05-02 DIAGNOSIS — G45.9 TIA (TRANSIENT ISCHEMIC ATTACK): Primary | ICD-10-CM

## 2022-05-02 DIAGNOSIS — I10 ESSENTIAL HYPERTENSION: ICD-10-CM

## 2022-05-02 DIAGNOSIS — E78.2 MIXED HYPERLIPIDEMIA: ICD-10-CM

## 2022-05-02 PROCEDURE — 99214 OFFICE O/P EST MOD 30 MIN: CPT | Performed by: FAMILY MEDICINE

## 2022-05-02 RX ORDER — KETOCONAZOLE 20 MG/G
CREAM TOPICAL DAILY
Qty: 30 G | Refills: 1 | Status: SHIPPED | OUTPATIENT
Start: 2022-05-02 | End: 2022-05-13

## 2022-05-02 NOTE — PROGRESS NOTES
Subjective      Patient ID: Thelma Gomes is a 86 y.o. female.    HPI     Facility/Provider transitioning from: [facility]  Was the patient treated and released from Er? NO   Was the patient admitted to hospital? YES  Date of discharge? 4/ 3/22  Was the patient contacted within 2 business days of discharge? no  New meds? plavix 75  Discontinued meds? NO    Presents with daughter -   sudden episode of confusion     Starring at daughter     Unable to speak    lasted 20 min  Transferred to [facility] -sx resolved approx 8 hours / no residual - extensive work up NEG - seen by neurology - rec plavix 75 for DX TIA  No recurrence sx  Doing well with PT at home - recommended local neuro follow up    ALL LABS / XRAYS /CONSULTS REVIEWED AND DISCUSSED WITH PT AND DAUGHTER    The following have been reviewed and updated as appropriate in this visit:   Allergies  Meds  Problems         Review of Systems   Constitutional: Negative for chills, fatigue and unexpected weight change.   Eyes: Negative for visual disturbance.   Respiratory: Negative for cough and shortness of breath.    Cardiovascular: Negative for chest pain, palpitations and leg swelling.   Gastrointestinal: Negative for abdominal pain, blood in stool, constipation, diarrhea and nausea.   Endocrine: Negative for cold intolerance and heat intolerance.   Genitourinary: Negative for dysuria, frequency, hematuria and urgency.   Musculoskeletal: Positive for gait problem. Negative for arthralgias.   Skin: Positive for wound (continues with topical RX). Negative for color change.   Neurological: Negative for dizziness, numbness and headaches.   Hematological: Negative for adenopathy. Does not bruise/bleed easily.   Psychiatric/Behavioral: Negative for sleep disturbance.       Current Outpatient Medications   Medication Sig Dispense Refill   • aspirin 81 mg enteric coated tablet Take 81 mg by mouth daily.     • atorvastatin (LIPITOR) 40 mg tablet Take 1 tablet (40 mg total) by mouth  once daily. 90 tablet 1   • blood sugar diagnostic ( BLOOD GLUCOSE TEST STRIP) strip Inject 1 strip under the skin 2 (two) times a day. TEST EVERY  strip 1   • cholecalciferol, vitamin D3, (VITAMIN D3) 100 mcg (4,000 unit) capsule       • clopidogreL (PLAVIX) 75 mg tablet TAKE ONE TABLET BY MOUTH EVERY DAY 30 tablet 3   • colchicine (COLCRYS) 0.6 mg tablet Take 1 tablet (0.6 mg total) by mouth once daily. 90 tablet 0   • freestyle 28 gauge lancets Test sugar once daily. Dx: E11.9 100 each 1   • furosemide (LASIX) 40 mg tablet Take 1 tablet (40 mg total) by mouth daily. 90 tablet 1   • gabapentin (NEURONTIN) 100 mg capsule TAKE ONE CAPSULE BY MOUTH EVERY MORNING AND TAKE TWO CAPSULES BY MOUTH EVERY EVENING 90 capsule 3   • glimepiride (AmaryL) 2 mg tablet Take 1 tablet (2 mg total) by mouth daily with breakfast. 90 tablet 1   • ketoconazole (NIZORAL) 2 % cream Apply topically daily. 30 g 1   • losartan (COZAAR) 100 mg tablet Take 1 tablet (100 mg total) by mouth once daily. 90 tablet 1   • metFORMIN XR (GLUCOPHAGE-XR) 500 mg 24 hr tablet Take 1 tablet (500 mg total) by mouth 2 (two) times a day. 180 tablet 1   • pantoprazole 40 mg EC tablet Take 1 tablet (40 mg total) by mouth once daily. 90 tablet 1   • potassium chloride 10 mEq CR tablet TAKE 1 Tablet BY MOUTH EVERY DAY 90 tablet 1   • tamsulosin (FLOMAX) 0.4 mg capsule Take 1 capsule (0.4 mg total) by mouth once daily. 90 capsule 1   • triamcinolone (KENALOG) 0.1 % cream APPLY TO THE AFFECTED AREA(S) TOPICALLY TWICE DAILY FOR ONE WEEK       No current facility-administered medications for this visit.     History reviewed. No pertinent past medical history.  Family History   Problem Relation Age of Onset   • Diabetes Biological Mother    • Breast cancer Biological Sister    • Hypertension Biological Sister    • Stroke Biological Sister    • Lung cancer Biological Brother      Past Surgical History:   Procedure Laterality Date   •  SECTION     •  CORONARY STENT PLACEMENT       Allergies   Allergen Reactions   • Cephalexin Monohydrate    • Cortisone Acetate    • Morphine      Other reaction(s): Rash     Social History     Socioeconomic History   • Marital status:      Spouse name: Not on file   • Number of children: Not on file   • Years of education: Not on file   • Highest education level: Not on file   Occupational History   • Not on file   Tobacco Use   • Smoking status: Former Smoker   • Smokeless tobacco: Never Used   Substance and Sexual Activity   • Alcohol use: No   • Drug use: Not on file   • Sexual activity: Defer   Other Topics Concern   • Not on file   Social History Narrative   • Not on file     Social Determinants of Health     Financial Resource Strain: Not on file   Food Insecurity: Not on file   Transportation Needs: Not on file   Physical Activity: Not on file   Stress: Not on file   Social Connections: Not on file   Intimate Partner Violence: Not on file   Housing Stability: Not on file     Vitals:    05/02/22 1529 05/02/22 1559   BP: 114/68 122/78   BP Location: Left upper arm    Patient Position: Sitting    Pulse: 82    Resp: 16    Temp: 36.3 °C (97.4 °F)    TempSrc: Oral    SpO2: 96%    Weight: 91.6 kg (202 lb)    Height: 1.524 m (5')        Objective     Physical Exam  Constitutional:       General: She is not in acute distress.  Neck:      Thyroid: No thyromegaly.      Vascular: Carotid bruit present.   Cardiovascular:      Rate and Rhythm: Normal rate and regular rhythm.      Pulses: Normal pulses.      Heart sounds: Murmur heard.    Systolic murmur is present with a grade of 2/6.  Pulmonary:      Effort: Pulmonary effort is normal.      Breath sounds: Normal breath sounds.   Abdominal:      Palpations: Abdomen is soft. There is no hepatomegaly or splenomegaly.      Tenderness: There is no abdominal tenderness. There is no right CVA tenderness or left CVA tenderness.   Musculoskeletal:      Right lower leg: No edema.      Left  lower leg: No edema.   Neurological:      Mental Status: She is alert and oriented to person, place, and time.   Psychiatric:         Mood and Affect: Mood normal.         Speech: Speech normal.         Behavior: Behavior normal.         Assessment/Plan   Problem List Items Addressed This Visit        Circulatory    Chronic systolic congestive heart failure (CMS/[facility])    Essential hypertension     Hypertension well controlled    /78 - goal < 130/80  Recommend regular exercise and low salt diet  Risk and potential complications of hypertension discussed  Importance of medication compliance and regular follow up emphasized  Role of medication/diet/exercise in treating hypertension discussed  Treatment plan and follow up reviewed and understood by patient           PVD (peripheral vascular disease) (CMS/[facility])       Endocrine/Metabolic    Type 2 diabetes mellitus without complication, without long-term current use of insulin (CMS/[facility])     FAIR diabetic control.       A1c 8.0 - goal < 7  Current medications reviewed.  Recommend diet modifications and weight loss.   Met with patient to review diet, exercise, and glucometer readings  Reviewed medications and the importance of compliance  Diabetes health maintenance plan and follow up was discussed and understood by patient           Mixed hyperlipidemia     GOOD hyperlipidemia control      LDL  74  - goal < 80  CONTINUE CURRENT THERAPY  Recommend low fat diet - Risks and potential complications of hyperlipidemia reviewed  Role of medications,diet, exercise in the treatment of hyperlipidemia discussed   Treatment plan and follow up reviewed and understood by patient             Other Visit Diagnoses     TIA (transient ischemic attack)    -  Primary    discussed issues of dual antiplatelet RX  continue pending neurology marely Katz DO  5/10/2022

## 2022-05-02 NOTE — LETTER
May 2, 2022     Patient: Thelma Gomes  YOB: 1935    To Whom it May Concern:    Thelma Gomes was seen in my clinic on 5/2/2022 .  She is under my care for multiple medical problems including TIA and strokes.  She may not be alone in the home.  Her daughter is the primary care taker and is with her at all times when she is not at work.  Please allow her daughter Isis Graham to alter her work hours to care for my patient.    If you have any questions or concerns, please don't hesitate to call.         Sincerely,         Traci Katz,         CC: No Recipients

## 2022-05-05 ENCOUNTER — TELEPHONE (OUTPATIENT)
Dept: FAMILY MEDICINE | Facility: CLINIC | Age: 87
End: 2022-05-05
Payer: MEDICARE

## 2022-05-05 NOTE — TELEPHONE ENCOUNTER
Home Care calling - Need Order faxed for clarification on wound care for b/l LE    3 different creams ordered at this time and unsure if to be applying all 3 ?   Ketoconazole  Triamcinolone  A+D Cream    Applying ABD's and Wrapping legs 3x week    Blanca RN - [phone]    [facility] fax - [phone]

## 2022-05-05 NOTE — TELEPHONE ENCOUNTER
"Daughter called and stated that ketoconozole + AD cream should both be applied to legs toegther  Stated, \"its the only way her legs stay okay.\"   Per daughter that was the way wound care had ordered it  Legs get itchy without use of creams   Pt has not been seen at wound care for >1 year     "

## 2022-05-06 ENCOUNTER — TELEPHONE (OUTPATIENT)
Dept: FAMILY MEDICINE | Facility: CLINIC | Age: 87
End: 2022-05-06
Payer: MEDICARE

## 2022-05-06 NOTE — TELEPHONE ENCOUNTER
CALL DAUGHTER AND WOUND CARE NURSE  I SPOKE WITH DR WALLACE    TRIPLE AGENT RX WAS PRESCRIBED BY DR LLANOS WHO IS NO LONGER IN PRACTICE  PT LEGS LOOKING GOOD - OK TO CONTINUE CURRENT RX  WILL RENEW AS NEEDED

## 2022-05-10 PROBLEM — I69.351 HEMIPLEGIA AND HEMIPARESIS FOLLOWING CEREBRAL INFARCTION AFFECTING RIGHT DOMINANT SIDE (CMS/HCC): Status: RESOLVED | Noted: 2020-06-11 | Resolved: 2022-05-10

## 2022-05-10 ASSESSMENT — ENCOUNTER SYMPTOMS
NAUSEA: 0
DIARRHEA: 0
ABDOMINAL PAIN: 0
UNEXPECTED WEIGHT CHANGE: 0
ADENOPATHY: 0
HEMATURIA: 0
CHILLS: 0
PALPITATIONS: 0
BRUISES/BLEEDS EASILY: 0
DYSURIA: 0
BLOOD IN STOOL: 0
CONSTIPATION: 0
FATIGUE: 0
WOUND: 1
SHORTNESS OF BREATH: 0
FREQUENCY: 0
COUGH: 0
ARTHRALGIAS: 0
SLEEP DISTURBANCE: 0
HEADACHES: 0
NUMBNESS: 0
DIZZINESS: 0
COLOR CHANGE: 0

## 2022-05-10 NOTE — ASSESSMENT & PLAN NOTE
FAIR diabetic control.       A1c 8.0 - goal < 7  Current medications reviewed.  Recommend diet modifications and weight loss.   Met with patient to review diet, exercise, and glucometer readings  Reviewed medications and the importance of compliance  Diabetes health maintenance plan and follow up was discussed and understood by patient

## 2022-05-10 NOTE — ASSESSMENT & PLAN NOTE
Hypertension well controlled    /78 - goal < 130/80  Recommend regular exercise and low salt diet  Risk and potential complications of hypertension discussed  Importance of medication compliance and regular follow up emphasized  Role of medication/diet/exercise in treating hypertension discussed  Treatment plan and follow up reviewed and understood by patient

## 2022-05-13 RX ORDER — KETOCONAZOLE 20 MG/G
CREAM TOPICAL DAILY
Qty: 30 G | Refills: 1 | Status: SHIPPED | OUTPATIENT
Start: 2022-05-13

## 2022-06-01 ENCOUNTER — TELEPHONE (OUTPATIENT)
Dept: FAMILY MEDICINE | Facility: CLINIC | Age: 87
End: 2022-06-01
Payer: MEDICARE

## 2022-06-01 RX ORDER — TRIAMCINOLONE ACETONIDE 1 MG/G
CREAM TOPICAL 2 TIMES DAILY
Qty: 30 G | Refills: 0 | Status: SHIPPED | OUTPATIENT
Start: 2022-06-01
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
## 2022-06-06 RX ORDER — CLOPIDOGREL BISULFATE 75 MG/1
TABLET ORAL
Qty: 30 TABLET | Refills: 3 | Status: SHIPPED | OUTPATIENT
Start: 2022-06-06 | End: 2022-07-10 | Stop reason: SDUPTHER

## 2022-06-20 ENCOUNTER — TELEPHONE (OUTPATIENT)
Dept: FAMILY MEDICINE | Facility: CLINIC | Age: 87
End: 2022-06-20
Payer: MEDICARE

## 2022-06-20 NOTE — TELEPHONE ENCOUNTER
Adrienne called from [facility] in regards to John's weight limits. She said that john has been ranging between 199-201.5 lbs and would like to know if the order can be updated to increase her limits. She would like the lower limit to be 202 (up from 183) and the upper limit to 205 (up from 193).     If possible, would like it faxed over to [phone].  Call back is [phone] for any questions

## 2022-06-22 NOTE — TELEPHONE ENCOUNTER
PLEASE CALL LIZANDRO [facility]KE[facility] UNCLEAR RE WEIGHT LIMITS - IS SHE REFERRING TO CHF PROTOCOL FOR LASIX USE   WHY DO WE NEED SUCH STRICT DOCUMENTATION

## 2022-07-03 RX ORDER — METFORMIN HYDROCHLORIDE 500 MG/1
TABLET, EXTENDED RELEASE ORAL
Qty: 180 TABLET | Refills: 1 | Status: SHIPPED | OUTPATIENT
Start: 2022-07-03 | End: 2023-01-09

## 2022-07-11 RX ORDER — CLOPIDOGREL BISULFATE 75 MG/1
75 TABLET ORAL
Qty: 30 TABLET | Refills: 3 | Status: SHIPPED | OUTPATIENT
Start: 2022-07-11 | End: 2022-08-30 | Stop reason: SDUPTHER

## 2022-07-12 RX ORDER — GLIMEPIRIDE 2 MG/1
TABLET ORAL
Qty: 90 TABLET | Refills: 1 | Status: SHIPPED | OUTPATIENT
Start: 2022-07-12 | End: 2023-01-09

## 2022-07-12 RX ORDER — LOSARTAN POTASSIUM 100 MG/1
TABLET ORAL
Qty: 90 TABLET | Refills: 1 | Status: SHIPPED | OUTPATIENT
Start: 2022-07-12 | End: 2023-01-01

## 2022-07-13 ENCOUNTER — TELEPHONE (OUTPATIENT)
Dept: FAMILY MEDICINE | Facility: CLINIC | Age: 87
End: 2022-07-13
Payer: MEDICARE

## 2022-07-13 NOTE — TELEPHONE ENCOUNTER
Fax from [facility]   Current parameter for weight 183-193  Pt has had stable weights over last few months  199, 200.8, 201.5, 200.5,199.5,199  Order qued for new parameters

## 2022-07-14 RX ORDER — POTASSIUM CHLORIDE 750 MG/1
TABLET, EXTENDED RELEASE ORAL
Qty: 90 TABLET | Refills: 1 | Status: SHIPPED | OUTPATIENT
Start: 2022-07-14 | End: 2023-01-05

## 2022-07-14 RX ORDER — PANTOPRAZOLE SODIUM 40 MG/1
TABLET, DELAYED RELEASE ORAL
Qty: 90 TABLET | Refills: 1 | Status: SHIPPED | OUTPATIENT
Start: 2022-07-14 | End: 2023-01-09

## 2022-08-17 RX ORDER — GABAPENTIN 100 MG/1
CAPSULE ORAL
Qty: 90 CAPSULE | Refills: 3 | Status: SHIPPED | OUTPATIENT
Start: 2022-08-17 | End: 2022-08-30 | Stop reason: SDUPTHER

## 2022-08-30 RX ORDER — GABAPENTIN 100 MG/1
CAPSULE ORAL
Qty: 270 CAPSULE | Refills: 1 | Status: SHIPPED | OUTPATIENT
Start: 2022-08-30 | End: 2022-11-10 | Stop reason: SDUPTHER

## 2022-08-30 RX ORDER — CLOPIDOGREL BISULFATE 75 MG/1
75 TABLET ORAL
Qty: 90 TABLET | Refills: 1 | Status: SHIPPED | OUTPATIENT
Start: 2022-08-30 | End: 2023-01-05

## 2022-08-30 NOTE — TELEPHONE ENCOUNTER
Medicine Refill Request    Last Office: 5/2/2022   Last Consult Visit: Visit date not found  Last Telemedicine Visit: Visit date not found    Next Appointment: 9/8/2022      Current Outpatient Medications:   •  aspirin 81 mg enteric coated tablet, Take 81 mg by mouth daily., Disp: , Rfl:   •  atorvastatin (LIPITOR) 40 mg tablet, Take 1 tablet (40 mg total) by mouth once daily., Disp: 90 tablet, Rfl: 1  •  blood sugar diagnostic ( BLOOD GLUCOSE TEST STRIP) strip, Inject 1 strip under the skin 2 (two) times a day. TEST EVERY DAY, Disp: 200 strip, Rfl: 1  •  cholecalciferol, vitamin D3, (VITAMIN D3) 100 mcg (4,000 unit) capsule,  , Disp: , Rfl:   •  clopidogreL (PLAVIX) 75 mg tablet, Take 1 tablet (75 mg total) by mouth once daily., Disp: 30 tablet, Rfl: 3  •  colchicine (COLCRYS) 0.6 mg tablet, Take 1 tablet (0.6 mg total) by mouth once daily., Disp: 90 tablet, Rfl: 0  •  freestyle 28 gauge lancets, Test sugar once daily. Dx: E11.9, Disp: 100 each, Rfl: 1  •  furosemide (LASIX) 40 mg tablet, Take 1 tablet (40 mg total) by mouth daily., Disp: 90 tablet, Rfl: 1  •  gabapentin (NEURONTIN) 100 mg capsule, TAKE ONE CAPSULE BY MOUTH IN THE MORNING AND TAKE TWO CAPSULES BY MOUTH IN THE EVENING, Disp: 90 capsule, Rfl: 3  •  glimepiride (AMARYL) 2 mg tablet, TAKE ONE TABLET BY MOUTH ONCE DAILY IN THE MORNING WITH BREAKFAST, Disp: 90 tablet, Rfl: 1  •  ketoconazole (NIZORAL) 2 % cream, APPLY TOPICALLY DAILY, Disp: 30 g, Rfl: 1  •  losartan (COZAAR) 100 mg tablet, TAKE ONE TABLET BY MOUTH EVERY DAY, Disp: 90 tablet, Rfl: 1  •  metFORMIN XR (GLUCOPHAGE-XR) 500 mg 24 hr tablet, TAKE ONE TABLET BY MOUTH TWICE DAILY, Disp: 180 tablet, Rfl: 1  •  pantoprazole (PROTONIX) 40 mg EC tablet, TAKE ONE TABLET BY MOUTH EVERY DAY, Disp: 90 tablet, Rfl: 1  •  potassium chloride (KLOR-CON) 10 mEq CR tablet, TAKE 1 Tablet BY MOUTH EVERY DAY, Disp: 90 tablet, Rfl: 1  •  tamsulosin (FLOMAX) 0.4 mg capsule, Take 1 capsule (0.4 mg total) by  mouth once daily., Disp: 90 capsule, Rfl: 1  •  triamcinolone (KENALOG) 0.1 % cream, Apply topically 2 (two) times a day., Disp: 30 g, Rfl: 0      BP Readings from Last 3 Encounters:   05/02/22 122/78   03/08/22 118/70   11/09/21 112/70       Recent Lab results:  Lab Results   Component Value Date    CHOL 155 03/11/2022   ,   Lab Results   Component Value Date    HDL 57 03/11/2022   ,   Lab Results   Component Value Date    LDLCALC 72 03/11/2022   ,   Lab Results   Component Value Date    TRIG 180 (H) 03/11/2022        Lab Results   Component Value Date    GLUCOSE 128 (H) 03/11/2022   ,   Lab Results   Component Value Date    HGBA1C 8.0 (H) 03/31/2022         Lab Results   Component Value Date    CREATININE 0.95 (H) 03/11/2022       Lab Results   Component Value Date    TSH 2.15 03/15/2019

## 2022-09-09 ENCOUNTER — OFFICE VISIT (OUTPATIENT)
Dept: FAMILY MEDICINE | Facility: CLINIC | Age: 87
End: 2022-09-09
Payer: MEDICARE

## 2022-09-09 VITALS
SYSTOLIC BLOOD PRESSURE: 118 MMHG | HEART RATE: 80 BPM | DIASTOLIC BLOOD PRESSURE: 68 MMHG | TEMPERATURE: 98.4 F | BODY MASS INDEX: 39.45 KG/M2 | RESPIRATION RATE: 18 BRPM | HEIGHT: 60 IN | OXYGEN SATURATION: 98 %

## 2022-09-09 DIAGNOSIS — E78.2 MIXED HYPERLIPIDEMIA: ICD-10-CM
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
## 2022-09-09 DIAGNOSIS — I50.22 CHRONIC SYSTOLIC CONGESTIVE HEART FAILURE (CMS/HCC): ICD-10-CM

## 2022-09-09 DIAGNOSIS — S00.83XA CONTUSION OF FOREHEAD, INITIAL ENCOUNTER: Primary | ICD-10-CM

## 2022-09-09 DIAGNOSIS — Z23 NEEDS FLU SHOT: ICD-10-CM

## 2022-09-09 DIAGNOSIS — I10 ESSENTIAL HYPERTENSION: ICD-10-CM

## 2022-09-09 DIAGNOSIS — S46.911A RIGHT SHOULDER STRAIN, INITIAL ENCOUNTER: ICD-10-CM

## 2022-09-09 DIAGNOSIS — E11.9 TYPE 2 DIABETES MELLITUS WITHOUT COMPLICATION, WITHOUT LONG-TERM CURRENT USE OF INSULIN (CMS/HCC): ICD-10-CM

## 2022-09-09 PROCEDURE — G0008 ADMIN INFLUENZA VIRUS VAC: HCPCS | Performed by: FAMILY MEDICINE

## 2022-09-09 PROCEDURE — 99214 OFFICE O/P EST MOD 30 MIN: CPT | Mod: 25 | Performed by: FAMILY MEDICINE

## 2022-09-09 PROCEDURE — 90694 VACC AIIV4 NO PRSRV 0.5ML IM: CPT | Performed by: FAMILY MEDICINE

## 2022-09-09 RX ORDER — ACETAMINOPHEN AND CODEINE PHOSPHATE 300; 30 MG/1; MG/1
TABLET ORAL
Qty: 28 TABLET | Refills: 0 | Status: SHIPPED | OUTPATIENT
Start: 2022-09-09 | End: 2023-01-05
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
## 2022-09-09 NOTE — PROGRESS NOTES
Subjective      Patient ID: Thelma Gomes is a 86 y.o. female.    HPI      SEEN IN ER 9/7      FALL AT HOME - LOSS OF BALANCE WHEN ATEMPTING TO WEIGHT SELF ALONE  STRUCK RIGHT SIDE OF HEAD AND RIGHT SIDE  CONCERNS :  PAIN WITH DEEP BREATHING                                 NO HEMOPTYSIS                            NO HA                            VISION NORMAL                             NO NECK PAIN                            NO CHANGES BOWELS/URINE                     MAIN ISSUES RIGHT SHOULDER PAIN WITH ROM    The following have been reviewed and updated as appropriate in this visit:          Review of Systems    Current Outpatient Medications   Medication Sig Dispense Refill    aspirin 81 mg enteric coated tablet Take 81 mg by mouth daily.      atorvastatin (LIPITOR) 40 mg tablet Take 1 tablet (40 mg total) by mouth once daily. 90 tablet 1    cholecalciferol, vitamin D3, (VITAMIN D3) 100 mcg (4,000 unit) capsule        clopidogreL (PLAVIX) 75 mg tablet Take 1 tablet (75 mg total) by mouth once daily. 90 tablet 1    furosemide (LASIX) 40 mg tablet Take 1 tablet (40 mg total) by mouth daily. 90 tablet 1    gabapentin (NEURONTIN) 100 mg capsule TAKE ONE CAPSULE BY MOUTH IN THE MORNING AND TAKE TWO CAPSULES BY MOUTH IN THE EVENING 270 capsule 1    glimepiride (AMARYL) 2 mg tablet TAKE ONE TABLET BY MOUTH ONCE DAILY IN THE MORNING WITH BREAKFAST 90 tablet 1    losartan (COZAAR) 100 mg tablet TAKE ONE TABLET BY MOUTH EVERY DAY 90 tablet 1    metFORMIN XR (GLUCOPHAGE-XR) 500 mg 24 hr tablet TAKE ONE TABLET BY MOUTH TWICE DAILY 180 tablet 1    pantoprazole (PROTONIX) 40 mg EC tablet TAKE ONE TABLET BY MOUTH EVERY DAY 90 tablet 1    potassium chloride (KLOR-CON) 10 mEq CR tablet TAKE 1 Tablet BY MOUTH EVERY DAY 90 tablet 1    tamsulosin (FLOMAX) 0.4 mg capsule Take 1 capsule (0.4 mg total) by mouth once daily. 90 capsule 1    blood sugar diagnostic ( BLOOD GLUCOSE TEST STRIP) strip Inject 1 strip under  the skin 2 (two) times a day. TEST EVERY  strip 1    colchicine (COLCRYS) 0.6 mg tablet Take 1 tablet (0.6 mg total) by mouth once daily. (Patient not taking: Reported on 2022) 90 tablet 0    freestyle 28 gauge lancets Test sugar once daily. Dx: E11.9 100 each 1    ketoconazole (NIZORAL) 2 % cream APPLY TOPICALLY DAILY 30 g 1    triamcinolone (KENALOG) 0.1 % cream Apply topically 2 (two) times a day. 30 g 0     No current facility-administered medications for this visit.     History reviewed. No pertinent past medical history.  Family History   Problem Relation Age of Onset    Diabetes Biological Mother     Breast cancer Biological Sister     Hypertension Biological Sister     Stroke Biological Sister     Lung cancer Biological Brother      Past Surgical History:   Procedure Laterality Date     SECTION      CORONARY STENT PLACEMENT       Allergies   Allergen Reactions    Cephalexin Monohydrate     Cortisone Acetate     Morphine      Other reaction(s): Rash     Social History     Socioeconomic History    Marital status:      Spouse name: Not on file    Number of children: Not on file    Years of education: Not on file    Highest education level: Not on file   Occupational History    Not on file   Tobacco Use    Smoking status: Former Smoker    Smokeless tobacco: Never Used   Substance and Sexual Activity    Alcohol use: No    Drug use: Not on file    Sexual activity: Defer   Other Topics Concern    Not on file   Social History Narrative    Not on file     Social Determinants of Health     Financial Resource Strain: Not on file   Food Insecurity: Not on file   Transportation Needs: Not on file   Physical Activity: Not on file   Stress: Not on file   Social Connections: Not on file   Intimate Partner Violence: Not on file   Housing Stability: Not on file         Objective     Physical Exam    Assessment/Plan   Problem List Items Addressed This Visit    None          Traci Katz,   9/9/2022

## 2022-09-11 ASSESSMENT — ENCOUNTER SYMPTOMS
BLOOD IN STOOL: 0
CHILLS: 0
CONSTIPATION: 0
COUGH: 0
FREQUENCY: 0
COLOR CHANGE: 0
BRUISES/BLEEDS EASILY: 0
ADENOPATHY: 0
ABDOMINAL PAIN: 0
DIARRHEA: 0
NUMBNESS: 0
NAUSEA: 0
HEMATURIA: 0
ARTHRALGIAS: 0
DYSURIA: 0
SLEEP DISTURBANCE: 0
DIZZINESS: 0
FATIGUE: 1
SHORTNESS OF BREATH: 0
PALPITATIONS: 0
HEADACHES: 1
UNEXPECTED WEIGHT CHANGE: 0

## 2022-09-11 NOTE — ASSESSMENT & PLAN NOTE
Hypertension well controlled    /68 - goal < 130/80  Recommend regular exercise and low salt diet  Risk and potential complications of hypertension discussed  Importance of medication compliance and regular follow up emphasized  Role of medication/diet/exercise in treating hypertension discussed  Treatment plan and follow up reviewed and understood by patient

## 2022-09-18 RX ORDER — FUROSEMIDE 40 MG/1
TABLET ORAL
Qty: 90 TABLET | Refills: 1 | Status: SHIPPED | OUTPATIENT
Start: 2022-09-18 | End: 2023-01-24

## 2022-09-18 RX ORDER — ATORVASTATIN CALCIUM 40 MG/1
TABLET, FILM COATED ORAL
Qty: 90 TABLET | Refills: 1 | Status: SHIPPED | OUTPATIENT
Start: 2022-09-18

## 2022-09-18 RX ORDER — TAMSULOSIN HYDROCHLORIDE 0.4 MG/1
CAPSULE ORAL
Qty: 90 CAPSULE | Refills: 1 | Status: SHIPPED | OUTPATIENT
Start: 2022-09-18 | End: 2023-01-05

## 2022-11-10 RX ORDER — GABAPENTIN 100 MG/1
CAPSULE ORAL
Qty: 270 CAPSULE | Refills: 1 | Status: SHIPPED | OUTPATIENT
Start: 2022-11-10 | End: 2023-01-05

## 2022-11-26 DIAGNOSIS — E11.9 TYPE 2 DIABETES MELLITUS WITHOUT COMPLICATION, WITHOUT LONG-TERM CURRENT USE OF INSULIN (CMS/HCC): ICD-10-CM

## 2022-11-26 RX ORDER — BLOOD-GLUCOSE CONTROL, NORMAL
EACH MISCELLANEOUS
Qty: 200 STRIP | Refills: 1 | Status: SHIPPED | OUTPATIENT
Start: 2022-11-26

## 2022-12-01 ENCOUNTER — TELEPHONE (OUTPATIENT)
Dept: FAMILY MEDICINE | Facility: CLINIC | Age: 87
End: 2022-12-01
Payer: MEDICARE

## 2022-12-29 ENCOUNTER — PATIENT OUTREACH (OUTPATIENT)
Dept: CASE MANAGEMENT | Facility: CLINIC | Age: 87
End: 2022-12-29
Payer: MEDICARE

## 2022-12-29 RX ORDER — METOPROLOL SUCCINATE 25 MG/1
12.5 TABLET, EXTENDED RELEASE ORAL DAILY
COMMUNITY
Start: 2022-12-28 | End: 2023-04-27

## 2022-12-29 RX ORDER — LANOLIN ALCOHOL/MO/W.PET/CERES
400 CREAM (GRAM) TOPICAL 2 TIMES DAILY
COMMUNITY

## 2022-12-29 ASSESSMENT — ENCOUNTER SYMPTOMS
BRUISES/BLEEDS EASILY: 1
VOMITING: 0
DIFFICULTY URINATING: 0
WEAKNESS: 1
DIARRHEA: 0
DIZZINESS: 0
FREQUENCY: 0
LIGHT-HEADEDNESS: 0
COUGH: 0
ABDOMINAL PAIN: 1
CONSTIPATION: 0
NAUSEA: 0
SHORTNESS OF BREATH: 0

## 2022-12-29 NOTE — PROGRESS NOTES
NAME: Thelma Gomes    MRN: [id number]    YOB: 1935    Event Review:    Initial TCM Patient Outreach Date: 12/29/22    Assessment completed with: Family Member  Patient stated reason for hospitalization: NSTEMI, Cardiomyopathy  Discharge Diagnosis: NSTEMI, Cardiomyopathy    Patient readmitted in the last 30 days: No  Discharging Facility:  ([facility])  Date of Last Admission: 12/08/22  Date of Last Discharge: 12/16/22    Discharging Facilty SNF/Rehab: [facility]  Date of Admission: 12/16/22  Date of Discharge: 12/28/22    Patient's perception of their health status since discharge: Improving    HPI:  Spoke with pt's daughter Jamila. Pt admitted to [facility] with fatigue and cough. Daughter was COVID positive. Pt found to be COVID positive. CT COVID pneumonia. Pt treated with Remdesivir and Decadron. Pt ruled in for NSTEMI. S/p cardiac cath with 95% blockage mid RCA. , 100% occlusion diagonal with collaterals., moderate-severe occlusion mid LAD. Echo EF 19%. Pt was not deemed a good candidate for intervention at this time. Pt discharged to rehab. Pt's magnesium was low. Pt started on magnesium.    Per daughter, pt is still weak. Progress is slow, but pt is able to get around with her walker. Pt is using her incentive spirometer. Pt with some loose stool.Per daughter, pt is having abdominal pain related to Lovenox injections. Her abdomen has a lot of bruising from the injections. Daughter is using ice to help with the pain.    Review of Systems   Respiratory: Negative for cough and shortness of breath.    Gastrointestinal: Positive for abdominal pain. Negative for constipation, diarrhea, nausea and vomiting.   Genitourinary: Negative for difficulty urinating, frequency and urgency.   Musculoskeletal: Positive for gait problem.   Neurological: Positive for weakness. Negative for dizziness and light-headedness.   Hematological: Bruises/bleeds easily.       Medication  Review:    Medication Review: Yes     Reported by: Child  Any new medications prescribed at discharge?: Yes  Is the patient having any side effects they believe may be caused by any medication additions or changes?: No  New prescriptions filled?: Yes     Do you have enough of your regularly prescribed medications?: Yes       Medication adherence problem?: No  Was a medication discrepancy indentified?: No       Nursing Interventions: No intervention needed  Reconciled the current and discharge medications: Yes  Reviewed AVS (Discharge Instructions)?: Yes    Acute Pain:    Acute pain: No    Chronic Pain:    Chronic pain: No    Diet/Nutrition:    Type of Diet: Regular, Diabetic  Diet Adherence: Adherent with diet    Home Care Services:    Home Care Agency:  ([facility])  Type of Home Care Services: Home PT, Home OT, Home SLP, Home Nursing  Home Care Interventions: No intervention required     Post-Discharge Durable Medical Equipment::    Durable Medical Equipment: Cane, Front wheeled walker, Lift chair, Bedside commode  Oxygen Use: No        DME Interventions: No intervention required    Home Management:    Living Arrangement: Child (Pt lives with her daughter and son in-law.)  Support System:: Child/Children, Family  Type of Residence: 2 story house (Pt with first floor set-up.)  Home Monitoring: Blood Sugars, Weight  Any patient reported falls in the last 3 months?: No  Islam or spiritual beliefs that impact treatment?: No    Appointment Scheduling:    PCP appointment scheduled: Yes     Appointment Date: 01/05/23     Appointment Provider: Dr. Hoffman        Follow-Ups:    BENITO Lam (Cardio): 1/3/23    Interventions/ Care Coordination:    Interventions/ Care Coordination: Addressed a knowledge deficit, Encouraged patient to schedule with the PCP/Specialist, Assisted patient in the scheduling of an appointment  General Education: Respiratory precautions (flu, colds, pneumonia, COVID-19),  Cold weather precautions, Falls/Home safety       Reviewed signs/symptoms of worsening condition or complication that necessitate a call to the Physician's office.  Educated patient on access to care.  RN phone number given for future care management.    Sandrita Gee RN  [phone]

## 2022-12-30 PROCEDURE — G0180 MD CERTIFICATION HHA PATIENT: HCPCS | Performed by: FAMILY MEDICINE

## 2023-01-01 ENCOUNTER — HOSPITAL ENCOUNTER (EMERGENCY)
Facility: HOSPITAL | Age: 88
End: 2023-02-10
Attending: EMERGENCY MEDICINE

## 2023-01-01 VITALS — SYSTOLIC BLOOD PRESSURE: 112 MMHG | TEMPERATURE: 96.7 F | DIASTOLIC BLOOD PRESSURE: 39 MMHG

## 2023-01-01 DIAGNOSIS — I46.9 CARDIAC ARREST (HCC): Primary | ICD-10-CM

## 2023-01-01 LAB
BASE EXCESS BLDA CALC-SCNC: -8 MMOL/L (ref -2–3)
CA-I BLD-SCNC: 0.95 MMOL/L (ref 1.12–1.32)
GLUCOSE SERPL-MCNC: 298 MG/DL (ref 65–140)
HCO3 BLDA-SCNC: 14.9 MMOL/L (ref 24–30)
HCT VFR BLD CALC: 38 % (ref 34.8–46.1)
HGB BLDA-MCNC: 12.9 G/DL (ref 11.5–15.4)
PCO2 BLD: 16 MMOL/L (ref 21–32)
PCO2 BLD: 24.9 MM HG (ref 42–50)
PH BLD: 7.39 [PH] (ref 7.3–7.4)
PO2 BLD: 102 MM HG (ref 35–45)
POTASSIUM BLD-SCNC: 4.9 MMOL/L (ref 3.5–5.3)
SAO2 % BLD FROM PO2: 98 % (ref 60–85)
SODIUM BLD-SCNC: 136 MMOL/L (ref 136–145)
SPECIMEN SOURCE: ABNORMAL

## 2023-01-01 RX ORDER — LOSARTAN POTASSIUM 100 MG/1
TABLET ORAL
Qty: 90 TABLET | Refills: 1 | Status: SHIPPED | OUTPATIENT
Start: 2023-01-01

## 2023-01-01 RX ORDER — EPINEPHRINE 0.1 MG/ML
SYRINGE (ML) INJECTION CODE/TRAUMA/SEDATION MEDICATION
Status: COMPLETED | OUTPATIENT
Start: 2023-01-01 | End: 2023-01-01

## 2023-01-01 RX ORDER — SODIUM BICARBONATE 84 MG/ML
INJECTION, SOLUTION INTRAVENOUS CODE/TRAUMA/SEDATION MEDICATION
Status: COMPLETED | OUTPATIENT
Start: 2023-01-01 | End: 2023-01-01

## 2023-01-01 RX ORDER — POTASSIUM CHLORIDE 750 MG/1
TABLET, EXTENDED RELEASE ORAL
Qty: 90 TABLET | Refills: 1 | OUTPATIENT
Start: 2023-01-01

## 2023-01-01 RX ADMIN — EPINEPHRINE 1 MG: 0.1 INJECTION INTRACARDIAC; INTRAVENOUS at 08:23

## 2023-01-01 RX ADMIN — EPINEPHRINE 1 MG: 0.1 INJECTION INTRACARDIAC; INTRAVENOUS at 08:30

## 2023-01-01 RX ADMIN — EPINEPHRINE 1 MG: 0.1 INJECTION INTRACARDIAC; INTRAVENOUS at 08:28

## 2023-01-01 RX ADMIN — SODIUM BICARBONATE 50 MEQ: 84 INJECTION, SOLUTION INTRAVENOUS at 08:26

## 2023-01-01 RX ADMIN — EPINEPHRINE 1 MG: 0.1 INJECTION INTRACARDIAC; INTRAVENOUS at 08:35

## 2023-01-03 ENCOUNTER — TELEPHONE (OUTPATIENT)
Dept: FAMILY MEDICINE | Facility: CLINIC | Age: 88
End: 2023-01-03
Payer: MEDICARE

## 2023-01-03 NOTE — TELEPHONE ENCOUNTER
Michelle PT - [facility] called:  Need verbal order for okay for PT s/p hospitalization to include gait training, therapeutic exercises, balance training, & activity tolerance training.   Phone # [phone]

## 2023-01-05 ENCOUNTER — OFFICE VISIT (OUTPATIENT)
Dept: FAMILY MEDICINE | Facility: CLINIC | Age: 88
End: 2023-01-05
Payer: MEDICARE

## 2023-01-05 VITALS
RESPIRATION RATE: 14 BRPM | OXYGEN SATURATION: 97 % | DIASTOLIC BLOOD PRESSURE: 60 MMHG | WEIGHT: 199 LBS | HEIGHT: 60 IN | SYSTOLIC BLOOD PRESSURE: 112 MMHG | HEART RATE: 71 BPM | BODY MASS INDEX: 39.07 KG/M2 | TEMPERATURE: 97.6 F

## 2023-01-05 DIAGNOSIS — U09.9 POST-COVID SYNDROME: ICD-10-CM

## 2023-01-05 DIAGNOSIS — E11.9 TYPE 2 DIABETES MELLITUS WITHOUT COMPLICATION, WITHOUT LONG-TERM CURRENT USE OF INSULIN (CMS/HCC): ICD-10-CM

## 2023-01-05 DIAGNOSIS — E66.01 OBESITY, MORBID (CMS/HCC): ICD-10-CM

## 2023-01-05 DIAGNOSIS — I50.22 CHRONIC SYSTOLIC CONGESTIVE HEART FAILURE (CMS/HCC): ICD-10-CM

## 2023-01-05 DIAGNOSIS — E11.51 TYPE 2 DIABETES MELLITUS WITH DIABETIC PERIPHERAL ANGIOPATHY WITHOUT GANGRENE, WITHOUT LONG-TERM CURRENT USE OF INSULIN (CMS/HCC): ICD-10-CM

## 2023-01-05 DIAGNOSIS — I73.9 PVD (PERIPHERAL VASCULAR DISEASE) (CMS/HCC): ICD-10-CM

## 2023-01-05 DIAGNOSIS — I21.4 NON-STEMI (NON-ST ELEVATED MYOCARDIAL INFARCTION) (CMS/HCC): Primary | ICD-10-CM

## 2023-01-05 PROCEDURE — 1111F DSCHRG MED/CURRENT MED MERGE: CPT | Performed by: FAMILY MEDICINE

## 2023-01-05 PROCEDURE — 99495 TRANSJ CARE MGMT MOD F2F 14D: CPT | Performed by: FAMILY MEDICINE

## 2023-01-05 RX ORDER — CLOPIDOGREL BISULFATE 75 MG/1
TABLET ORAL
Qty: 90 TABLET | Refills: 1 | Status: SHIPPED | OUTPATIENT
Start: 2023-01-05

## 2023-01-05 NOTE — TELEPHONE ENCOUNTER
"Call from daughter -  Patient currently receiving 67 hours of care through medicare   \"They want to drop her down about 23-24 hours\"  Daughter feels patient needs someone with her all the time  Asking for a letter stating that it is necessary to keep her 67 hours of care   "
Discussed with daughter   Having to take call with independent doctor -  Discussed recent nonSTEMI and covid  - she will discuss with him  
NEED MORE SPECIFICS FOR LETTER     HOW MANY HOURS PER DAY/ HOW MANY DAYS PER WEEK IS SHE RECEIVING SERVICES NOW - 67 DIVIDED BY 5 DAYS IS 13.4 HOURS PER DAY      HOW MANY HOURS DAUGHTER WORKS AND IS OUT OF HOUSE     SPECIFIC NEEDS - IE CANNOT PERFORM ADLS SUCH AS BATHROOM ETC     
Spoke with daughter:  Right now patient is receiving care 8-5 M-F & 8-7 Sat/Sun  Daughter work full time 40 week M-F & errands intermittently out of house    Patient is incontinent, unable to provide herself incontinent care  Unable to walk any distance, very weak and unsteady - fall risk  Unable to get to kitchen / BR by herself   Unable to dress self, shower   Unable to get food or water from fridge or prepare meals  Only able to feed herself when food/drink given to her      
Statement Selected

## 2023-01-05 NOTE — PATIENT INSTRUCTIONS
DECREASE MAGNESIUM TO EVERY OTHER DAY  METAMUCIL    GET COVID BOOSTER  KEEP CARDIOLOGY FOLLOW UP  CHECK LABS

## 2023-01-05 NOTE — PROGRESS NOTES
Subjective      Patient ID: Thelma Gomes is a 87 y.o. female.    [facility]     Facility/Provider transitioning from: [place] reading/ reading rehab  Was the patient treated and released from Er? no  Was the patient admitted to hospital?yes  Date of discharge? 12/28/22  Was the patient contacted within 2 business days of discharge? yes  New meds?Medications prescribed at discharge reconciled with current ambulatory medication list: Yes.Discontinued meds? yes    .Pt hospitalized with COVID pneumonia and NSTEMI -   Recovered without incident - transferred to inpatient rehab - now home with home PT weekly  Chronic venous insufficiency with leg wound stable   Has follow up scheduled with vascular and cardiology    ALL LABS / XRAYS/ CONSULTS HOSPITAL AND REHAB ADMISSIONS REVIEWED AND DISCUSSED WITH PT AND DAUGHTER  .  The following have been reviewed and updated as appropriate in this visit:   Allergies  Meds  Problems       Review of Systems   Constitutional: Negative for chills, fatigue and unexpected weight change.   Eyes: Negative for visual disturbance.   Respiratory: Negative for cough and shortness of breath.    Cardiovascular: Negative for chest pain, palpitations and leg swelling.   Gastrointestinal: Positive for diarrhea (? related to magnesium). Negative for abdominal pain, blood in stool, constipation and nausea.   Endocrine: Negative for cold intolerance and heat intolerance.   Genitourinary: Negative for dysuria, frequency, hematuria and urgency.   Musculoskeletal: Positive for arthralgias and gait problem.   Skin: Negative for color change.   Neurological: Negative for dizziness, numbness and headaches.   Hematological: Negative for adenopathy. Does not bruise/bleed easily.   Psychiatric/Behavioral: Negative for sleep disturbance.       Current Outpatient Medications   Medication Sig Dispense Refill   • aspirin 81 mg enteric coated tablet Take 81 mg by mouth daily.     • atorvastatin (LIPITOR) 40 mg tablet TAKE  ONE TABLET BY MOUTH EVERY DAY 90 tablet 1   • cholecalciferol, vitamin D3, (VITAMIN D3) 100 mcg (4,000 unit) capsule       • clopidogreL (PLAVIX) 75 mg tablet TAKE ONE TABLET BY MOUTH EVERY DAY 90 tablet 1   • freestyle 28 gauge lancets Test sugar once daily. Dx: E11.9 100 each 1   • furosemide (LASIX) 40 mg tablet TAKE ONE TABLET BY MOUTH EVERY DAY 90 tablet 1   •  BLOOD GLUCOSE TEST STRIP strip test TWICE DAILY 200 strip 1   • glimepiride (AMARYL) 2 mg tablet TAKE ONE TABLET BY MOUTH ONCE DAILY IN THE MORNING WITH BREAKFAST 90 tablet 1   • ketoconazole (NIZORAL) 2 % cream APPLY TOPICALLY DAILY 30 g 1   • losartan (COZAAR) 100 mg tablet TAKE ONE TABLET BY MOUTH EVERY DAY 90 tablet 1   • magnesium oxide (MAG-OX) 400 mg (241.3 mg magnesium) tablet Take 400 mg by mouth 2 (two) times a day.     • metFORMIN XR (GLUCOPHAGE-XR) 500 mg 24 hr tablet TAKE ONE TABLET BY MOUTH TWICE DAILY 180 tablet 1   • metoprolol succinate XL (TOPROL-XL) 25 mg 24 hr tablet Take 12.5 mg by mouth daily.     • pantoprazole (PROTONIX) 40 mg EC tablet TAKE ONE TABLET BY MOUTH EVERY DAY 90 tablet 1   • triamcinolone (KENALOG) 0.1 % cream Apply topically 2 (two) times a day. 30 g 0     No current facility-administered medications for this visit.     No past medical history on file.  Family History   Problem Relation Age of Onset   • Diabetes Biological Mother    • Breast cancer Biological Sister    • Hypertension Biological Sister    • Stroke Biological Sister    • Lung cancer Biological Brother      Past Surgical History:   Procedure Laterality Date   •  SECTION     • CORONARY STENT PLACEMENT       Allergies   Allergen Reactions   • Cephalexin Monohydrate    • Cortisone Acetate    • Morphine      Other reaction(s): Rash     Social History     Socioeconomic History   • Marital status:      Spouse name: Not on file   • Number of children: Not on file   • Years of education: Not on file   • Highest education level: Not on file    Occupational History   • Not on file   Tobacco Use   • Smoking status: Former   • Smokeless tobacco: Never   Substance and Sexual Activity   • Alcohol use: No   • Drug use: Not on file   • Sexual activity: Defer   Other Topics Concern   • Not on file   Social History Narrative   • Not on file     Social Determinants of Health     Financial Resource Strain: Not on file   Food Insecurity: Not on file   Transportation Needs: Not on file   Physical Activity: Not on file   Stress: Not on file   Social Connections: Not on file   Intimate Partner Violence: Not on file   Housing Stability: Not on file     Vitals:    01/05/23 1143 01/05/23 1205   BP: (!) 148/86 112/60   BP Location: Left forearm    Patient Position: Sitting    Pulse: 71    Resp: 14    Temp: 36.4 °C (97.6 °F)    TempSrc: Oral    SpO2: 97%    Weight: 90.3 kg (199 lb)    Height: 1.524 m (5')        Objective     Physical Exam  Constitutional:       Appearance: Normal appearance. She is well-developed and well-nourished.   Eyes:      Extraocular Movements: EOM normal.      Pupils: Pupils are equal, round, and reactive to light.      Funduscopic exam:     Right eye: No AV nicking.         Left eye: No AV nicking.   Neck:      Thyroid: No thyromegaly.      Vascular: Carotid bruit present.   Cardiovascular:      Rate and Rhythm: Normal rate and regular rhythm.      Pulses: Normal pulses.           Dorsalis pedis pulses are 2+ on the right side and 2+ on the left side.        Posterior tibial pulses are 2+ on the right side and 2+ on the left side.      Heart sounds: Murmur heard.    Systolic murmur is present with a grade of 2/6.     Comments: Venous insuff changes bilat L>R  Pulmonary:      Effort: Pulmonary effort is normal.      Breath sounds: Normal breath sounds.   Abdominal:      General: Bowel sounds are normal.      Palpations: Abdomen is soft. There is no hepatomegaly or splenomegaly.      Tenderness: There is no abdominal tenderness. There is no CVA  tenderness, right CVA tenderness or left CVA tenderness.   Musculoskeletal:      Right lower leg: No edema.      Left lower leg: No edema.   Skin:     General: Skin is warm, dry and intact.      Findings: No rash.   Neurological:      Mental Status: She is alert and oriented to person, place, and time.      Sensory: No sensory deficit.   Psychiatric:         Mood and Affect: Mood normal.         Speech: Speech normal.         Behavior: Behavior normal.         Assessment/Plan   Problem List Items Addressed This Visit        Circulatory    Chronic systolic congestive heart failure (CMS/[facility])     Diuretics as directed - daily weights           PVD (peripheral vascular disease) (CMS/[facility])     Follow up with vascular          Type 2 diabetes mellitus with diabetic peripheral angiopathy without gangrene, without long-term current use of insulin (CMS/[facility])       Endocrine/Metabolic    Type 2 diabetes mellitus without complication, without long-term current use of insulin (CMS/[facility])     UNKNOWN diabetic control.      Previous A1c 8.0 - goal < 7  Current medications reviewed.  Recommend diet modifications and weight loss.   Met with patient to review diet, exercise, and glucometer readings  Reviewed medications and the importance of compliance  Diabetes health maintenance plan and follow up was discussed and understood by patient         Relevant Orders    Lipid panel    Microalbumin / creatinine urine ratio    Hemoglobin A1c    Obesity, morbid (CMS/[facility])   Other Visit Diagnoses     Non-STEMI (non-ST elevated myocardial infarction) (CMS/[facility])    -  Primary    meds adjusted re prevention future events -     Post-COVID syndrome              Traci Hoffman-DO Narcisa  1/8/2023

## 2023-01-06 ENCOUNTER — PATIENT OUTREACH (OUTPATIENT)
Dept: CASE MANAGEMENT | Facility: CLINIC | Age: 88
End: 2023-01-06
Payer: MEDICARE

## 2023-01-06 NOTE — PROGRESS NOTES
TCM f/u NSTEMI, Cardiomyopathy. LMOM. Will f/u one week.     Sandrita Gee, KIMBERLYN, RN  [phone]

## 2023-01-08 PROBLEM — E66.01 OBESITY, MORBID (CMS/HCC): Status: ACTIVE | Noted: 2023-01-08

## 2023-01-08 PROBLEM — E11.51 TYPE 2 DIABETES MELLITUS WITH DIABETIC PERIPHERAL ANGIOPATHY WITHOUT GANGRENE, WITHOUT LONG-TERM CURRENT USE OF INSULIN (CMS/HCC): Status: ACTIVE | Noted: 2023-01-08

## 2023-01-08 ASSESSMENT — ENCOUNTER SYMPTOMS
NAUSEA: 0
CONSTIPATION: 0
HEADACHES: 0
SHORTNESS OF BREATH: 0
ABDOMINAL PAIN: 0
DYSURIA: 0
UNEXPECTED WEIGHT CHANGE: 0
CHILLS: 0
NUMBNESS: 0
HEMATURIA: 0
BRUISES/BLEEDS EASILY: 0
ADENOPATHY: 0
FREQUENCY: 0
COUGH: 0
PALPITATIONS: 0
DIZZINESS: 0
COLOR CHANGE: 0
DIARRHEA: 1
FATIGUE: 0
ARTHRALGIAS: 1
SLEEP DISTURBANCE: 0
BLOOD IN STOOL: 0

## 2023-01-08 NOTE — ASSESSMENT & PLAN NOTE
UNKNOWN diabetic control.      Previous A1c 8.0 - goal < 7  Current medications reviewed.  Recommend diet modifications and weight loss.   Met with patient to review diet, exercise, and glucometer readings  Reviewed medications and the importance of compliance  Diabetes health maintenance plan and follow up was discussed and understood by patient

## 2023-01-09 RX ORDER — PANTOPRAZOLE SODIUM 40 MG/1
TABLET, DELAYED RELEASE ORAL
Qty: 90 TABLET | Refills: 1 | Status: SHIPPED | OUTPATIENT
Start: 2023-01-09

## 2023-01-09 RX ORDER — METFORMIN HYDROCHLORIDE 500 MG/1
TABLET, EXTENDED RELEASE ORAL
Qty: 180 TABLET | Refills: 1 | Status: SHIPPED | OUTPATIENT
Start: 2023-01-09

## 2023-01-09 RX ORDER — GLIMEPIRIDE 2 MG/1
TABLET ORAL
Qty: 90 TABLET | Refills: 1 | Status: SHIPPED | OUTPATIENT
Start: 2023-01-09

## 2023-01-13 ENCOUNTER — PATIENT OUTREACH (OUTPATIENT)
Dept: CASE MANAGEMENT | Facility: CLINIC | Age: 88
End: 2023-01-13
Payer: MEDICARE

## 2023-01-13 NOTE — PROGRESS NOTES
TCM f/u NSTEMI, Cardiomyopathy. 2nd LMOM. If no return call will close to CM.     KIMBERLY NicholeN, RN  [phone]

## 2023-01-19 ENCOUNTER — PATIENT OUTREACH (OUTPATIENT)
Dept: CASE MANAGEMENT | Facility: CLINIC | Age: 88
End: 2023-01-19
Payer: MEDICARE

## 2023-01-20 ENCOUNTER — TELEPHONE (OUTPATIENT)
Dept: FAMILY MEDICINE | Facility: CLINIC | Age: 88
End: 2023-01-20
Payer: MEDICARE

## 2023-01-20 DIAGNOSIS — R35.0 URINARY FREQUENCY: ICD-10-CM

## 2023-01-20 DIAGNOSIS — N39.46 URGE AND STRESS INCONTINENCE: Primary | ICD-10-CM

## 2023-01-20 RX ORDER — NITROFURANTOIN 25; 75 MG/1; MG/1
100 CAPSULE ORAL 2 TIMES DAILY
Qty: 14 CAPSULE | Refills: 0 | Status: SHIPPED | OUTPATIENT
Start: 2023-01-20 | End: 2023-01-27

## 2023-01-20 NOTE — TELEPHONE ENCOUNTER
Call from [facility]  Patient would like to receive Covid Booster   Has a program that will give booster in home  Just needs a clearance letter saying she is able to get vaccine after 30 days s/p active infection - would have to wait 90 days if received plasma/antibiodies     Please fax letter to [phone]

## 2023-01-20 NOTE — TELEPHONE ENCOUNTER
"Daughter called with Increased frequency, Stress incontinence, Denies any burning/pain, Afebrile - Stated, \"this is what happened last time she started with a UTI\"     Can use [facility] @ [place] (only open till 2p today - open at 7am tomorrow)  Discussed plan with patient's daughter:  Will send lab for Urine to [facility] to obtain urine cup first thing in AM - please send  Will also send ABX to [facility] - Please send  Patient to start ABX after urine sample given tomorrow morning   Daughter agreeable to plan  "

## 2023-01-24 RX ORDER — FUROSEMIDE 20 MG/1
20 TABLET ORAL DAILY
Qty: 90 TABLET | Refills: 1 | Status: SHIPPED | OUTPATIENT
Start: 2023-01-24

## 2023-01-24 NOTE — TELEPHONE ENCOUNTER
Medicine Refill Request    Last Office: 1/5/2023   Last Consult Visit: Visit date not found  Last Telemedicine Visit: Visit date not found    Next Appointment: 5/9/2023      Current Outpatient Medications:   •  clopidogreL (PLAVIX) 75 mg tablet, TAKE ONE TABLET BY MOUTH EVERY DAY, Disp: 90 tablet, Rfl: 1  •  aspirin 81 mg enteric coated tablet, Take 81 mg by mouth daily., Disp: , Rfl:   •  atorvastatin (LIPITOR) 40 mg tablet, TAKE ONE TABLET BY MOUTH EVERY DAY, Disp: 90 tablet, Rfl: 1  •  cholecalciferol, vitamin D3, (VITAMIN D3) 100 mcg (4,000 unit) capsule,  , Disp: , Rfl:   •  freestyle 28 gauge lancets, Test sugar once daily. Dx: E11.9, Disp: 100 each, Rfl: 1  •  furosemide (LASIX) 40 mg tablet, TAKE ONE TABLET BY MOUTH EVERY DAY, Disp: 90 tablet, Rfl: 1  •   BLOOD GLUCOSE TEST STRIP strip, test TWICE DAILY, Disp: 200 strip, Rfl: 1  •  glimepiride (AMARYL) 2 mg tablet, TAKE ONE TABLET BY MOUTH ONCE DAILY IN THE MORNING WITH BREAKFAST, Disp: 90 tablet, Rfl: 1  •  ketoconazole (NIZORAL) 2 % cream, APPLY TOPICALLY DAILY, Disp: 30 g, Rfl: 1  •  losartan (COZAAR) 100 mg tablet, TAKE ONE TABLET BY MOUTH EVERY DAY, Disp: 90 tablet, Rfl: 1  •  magnesium oxide (MAG-OX) 400 mg (241.3 mg magnesium) tablet, Take 400 mg by mouth 2 (two) times a day., Disp: , Rfl:   •  metFORMIN XR (GLUCOPHAGE-XR) 500 mg 24 hr tablet, TAKE ONE TABLET BY MOUTH TWICE DAILY, Disp: 180 tablet, Rfl: 1  •  metoprolol succinate XL (TOPROL-XL) 25 mg 24 hr tablet, Take 12.5 mg by mouth daily., Disp: , Rfl:   •  nitrofurantoin, macrocrystal-monohydrate, (MACROBID) 100 mg capsule, Take 1 capsule (100 mg total) by mouth 2 (two) times a day for 7 days., Disp: 14 capsule, Rfl: 0  •  pantoprazole (PROTONIX) 40 mg EC tablet, TAKE ONE TABLET BY MOUTH EVERY DAY, Disp: 90 tablet, Rfl: 1  •  triamcinolone (KENALOG) 0.1 % cream, Apply topically 2 (two) times a day., Disp: 30 g, Rfl: 0      BP Readings from Last 3 Encounters:   01/05/23 112/60   09/09/22  118/68   05/02/22 122/78       Recent Lab results:  Lab Results   Component Value Date    CHOL 155 03/11/2022   ,   Lab Results   Component Value Date    HDL 57 03/11/2022   ,   Lab Results   Component Value Date    LDLCALC 72 03/11/2022   ,   Lab Results   Component Value Date    TRIG 180 (H) 03/11/2022        Lab Results   Component Value Date    GLUCOSE NEGATIVE 01/21/2023   ,   Lab Results   Component Value Date    HGBA1C 8.0 (H) 03/31/2022         Lab Results   Component Value Date    CREATININE 0.95 (H) 03/11/2022       Lab Results   Component Value Date    TSH 2.15 03/15/2019

## 2023-01-25 LAB
APPEARANCE UR: CLEAR
BACTERIA #/AREA URNS HPF: ABNORMAL /HPF
BACTERIA UR CULT: ABNORMAL
BACTERIA UR CULT: NORMAL
BILIRUB UR QL STRIP: NEGATIVE
COLOR UR: YELLOW
GLUCOSE UR QL STRIP: NEGATIVE
HGB UR QL STRIP: NEGATIVE
HYALINE CASTS #/AREA URNS LPF: ABNORMAL /LPF
KETONES UR QL STRIP: NEGATIVE
LEUKOCYTE ESTERASE UR QL STRIP: ABNORMAL
NITRITE UR QL STRIP: NEGATIVE
PH UR STRIP: 6.5 [PH] (ref 5–8)
PROT UR QL STRIP: ABNORMAL
RBC #/AREA URNS HPF: ABNORMAL /HPF
SERVICE CMNT-IMP: ABNORMAL
SP GR UR STRIP: 1.02 (ref 1–1.03)
SQUAMOUS #/AREA URNS HPF: ABNORMAL /HPF
WBC #/AREA URNS HPF: ABNORMAL /HPF

## 2023-01-27 RX ORDER — SULFAMETHOXAZOLE AND TRIMETHOPRIM 400; 80 MG/1; MG/1
1 TABLET ORAL 2 TIMES DAILY
Qty: 14 TABLET | Refills: 0 | Status: SHIPPED | OUTPATIENT
Start: 2023-01-27 | End: 2023-02-03

## 2023-02-10 NOTE — ED PROVIDER NOTES
IO Line    Date/Time: 2/10/2023 8:21 AM  Performed by: Sabra Arnold PA-C  Authorized by: Sabra Arnold PA-C     Patient location:  ED  Consent:     Consent obtained:  Emergent situation  Universal protocol:     Patient identity confirmed: Anonymous protocol, patient vented/unresponsive and provided demographic data  Indication:     Indications: fluid administration and rapid vascular access    Pre-procedure details:     Site preparation:  Chlorhexidine  Procedure details:     Insertion site:  Proximal tibia    Laterality:  Right    Insertion device:  Drill device and 15 gauge IO needle    IO Size:  15mm (pink)    Insertion: Needle was inserted through the bony cortex      Number of attempts:  1    Successful placement: yes      Insertion confirmation:  Aspiration of blood/marrow and easy infusion of fluids  Post-procedure details:     Patient tolerance of procedure:   Tolerated well, no immediate complications           Sabra Arnold PA-C  02/10/23 2648

## 2023-02-10 NOTE — ED PROCEDURE NOTE
Procedure  Central Line    Date/Time: 2/10/2023 8:30 AM  Performed by: Cody Honeycutt PA-C  Authorized by: Cody Honeycutt PA-C     Patient location:  ED  Consent:     Consent obtained:  Emergent situation  Universal protocol:     Patient identity confirmed: Anonymous protocol, patient vented/unresponsive and provided demographic data  Pre-procedure details:     Hand hygiene: Hand hygiene performed prior to insertion      Skin preparation:  2% chlorhexidine  Indications:     Central line indications: medications requiring central line and no peripheral vascular access    Anesthesia (see MAR for exact dosages): Anesthesia method:  None  Procedure details:     Location:  Right femoral    Vessel type: vein      Laterality:  Right    Approach: percutaneous technique used      Patient position:  Flat    Catheter type:  Triple lumen 20cm    Catheter size:  7 Fr    Landmarks identified: yes      Ultrasound guidance: no      Manometry confirmation: no      Number of attempts:  1    Successful placement: yes      Vessel of catheter tip end:  Hub  Post-procedure details:     Post-procedure:  Line sutured    Assessment:  Blood return through all ports    Patient tolerance of procedure:   Tolerated well, no immediate complications                     Cody Honeycutt PA-C  02/10/23 050

## 2023-02-10 NOTE — ED PROVIDER NOTES
History  Chief Complaint   Patient presents with   • Cardiac Arrest     Pt to er after radha sandoval called 911 for stroke like symptoms  Patient had a witnessed arrest after crew got her into the ambulance, cpr started immediately  BLS cpr provided  This is an 43-year-old female presents via EMS from nursing home asystolic CPR in progress  Patient was discharged from [facility] yesterday to nursing home  Today she became unresponsive with left-sided weakness in the presence of EMS she went unresponsive and CPR was initiated approximately 15 minutes prior to arrival   Central line intubation and ACLS protocol were initiated upon arrival to the emergency room but patient remained asystolic and code was called at 836  History provided by:  EMS personnel  Cardiac Arrest  Witnessed by:  Healthcare provider  Incident location:  intermediate  Time since incident:  15 minutes  Time before BLS initiated:  Immediate  Time before ALS initiated:  Immediate  Condition upon EMS arrival:  Unresponsive  Pulse:  Absent  Initial cardiac rhythm per EMS:  Asystole  Treatments prior to arrival:  CCR  Location of pulse detected during CCR:  Femoral  Airway:  Bag valve mask  Rhythm on admission to ED:  Asystole      None       Past Medical History:   Diagnosis Date   • CHF (congestive heart failure) ([facility])    • Coronary artery disease    • COVID-19    • Elevated HDL    • Ischemic cardiomyopathy    • NSTEMI (non-ST elevated myocardial infarction) ([facility] [address] )    • PAD (peripheral artery disease) ([facility] [address] )    • Right sided weakness     residual from previous stroke   • Stroke [facility])    • TIA (transient ischemic attack)        Past Surgical History:   Procedure Laterality Date   • CARDIAC CATHETERIZATION         History reviewed  No pertinent family history  I have reviewed and agree with the history as documented      E-Cigarette/Vaping     E-Cigarette/Vaping Substances     Social History     Tobacco Use   • Smoking status: Unknown   Substance Use Topics   • Alcohol use: Never   • Drug use: Never       Review of Systems   Unable to perform ROS: Patient unresponsive       Physical Exam  Physical Exam  Vitals and nursing note reviewed  Constitutional:       General: She is in acute distress  Comments: Patient unresponsive CPR in progress asystole on the monitor   HENT:      Head: Normocephalic and atraumatic  Right Ear: External ear normal       Left Ear: External ear normal       Nose: Nose normal    Eyes:      General:         Right eye: No discharge  Left eye: No discharge  Comments: Pupils fixed and dilated bilaterally   Cardiovascular:      Comments: Asystolic and CPR in progress  Pulmonary:      Comments: No spontaneous respirations patient being bagged by respiratory and was intubated with 7-1/2 tube by me  Abdominal:      General: There is no distension  Palpations: Abdomen is soft  There is no mass  Musculoskeletal:      Cervical back: Neck supple  No rigidity  Right lower leg: No edema  Left lower leg: No edema  Skin:     Coloration: Skin is pale  Findings: No erythema or rash     Neurological:      Comments: Unresponsive   Psychiatric:      Comments: Patient unresponsive         Vital Signs  ED Triage Vitals   Temperature Pulse Resp Blood Pressure SpO2   02/10/23 0835 02/10/23 0828 -- 02/10/23 0828 --   (!) 96 7 °F (35 9 °C) (!) 0  (!) 112/39       Temp Source Heart Rate Source Patient Position - Orthostatic VS BP Location FiO2 (%)   02/10/23 0835 -- -- -- --   Temporal          Pain Score       --                  Vitals:    02/10/23 0828   BP: (!) 112/39   Pulse: (!) 0         Visual Acuity      ED Medications  Medications   EPINEPHrine (ADRENALIN) injection (1 mg Intravenous Given 2/10/23 0835)   sodium bicarbonate 50 mEq/50 mL injection (50 mEq Intravenous Given 2/10/23 8137)       Diagnostic Studies  Results Reviewed     Procedure Component Value Units Date/Time    POCT Blood Gas (CG8+) [[id number]]  (Abnormal) Collected: 02/10/23 0837    Lab Status: Final result Specimen: Venous Updated: 02/10/23 0840     ph, Manjit ISTAT 7 387     pCO2, Manjit i-STAT 24 9 mm HG      pO2, Manjit i-STAT 102 0 mm HG      BE, i-STAT -8 mmol/L      HCO3, Manjit i-STAT 14 9 mmol/L      CO2, i-STAT 16 mmol/L      O2 Sat, i-STAT 98 %      SODIUM, I-STAT 136 mmol/l      Potassium, i-STAT 4 9 mmol/L      Calcium, Ionized i-STAT 0 95 mmol/L      Hct, i-STAT 38 %      Hgb, i-STAT 12 9 g/dl      Glucose, i-STAT 298 mg/dl      Specimen Type VENOUS                 No orders to display              Procedures  Procedures         ED Course                                             Medical Decision Making  Unresponsive CPR in progress with strokelike symptoms prior to arrival reported by EMS    Risk  Prescription drug management  Disposition  Final diagnoses:   Cardiac arrest ([facility] [address] ) - Strokelike symptoms prior to arrival     Time reflects when diagnosis was documented in both MDM as applicable and the Disposition within this note     Time User Action Codes Description Comment    2/10/2023  8:49 AM Klaudia Frey Add [I46 9] Cardiac arrest ([facility] [address] )     2/10/2023  8:49 AM Klaudia Frey Modify [I46 9] Cardiac arrest [facility]) Strokelike symptoms prior to arrival      ED Disposition     ED Disposition       Condition   --    Date/Time   Fri Feb 10, 2023  8:49 AM    Comment   Patient was pronounced by me at 836         Follow-up Information    None       Date, Time and Cause of Death    Preliminary Cause of Death: Cardiac arrest [facility])       Patient's Medications    No medications on file       No discharge procedures on file      PDMP Review     None          ED Provider  Electronically Signed by           Renee Leo DO  02/10/23 4384

## 2023-07-11 NOTE — TELEPHONE ENCOUNTER
Medicine Refill Request  Query rx  Last Office Visit: 9/26/2019  Next Office Visit: 3/26/2020        Current Outpatient Medications:   •  amLODIPine (NORVASC) 10 mg tablet, Take 1 tablet (10 mg total) by mouth once daily., Disp: 90 tablet, Rfl: 1  •  blood sugar diagnostic (TRUETEST TEST STRIPS) strip, use for daily glucose trsting, Disp: , Rfl:   •  cholecalciferol, vitamin D3, (VITAMIN D3) 4,000 unit capsule, take one capsule by oral route  every day with food, Disp: , Rfl:   •  clopidogrel (PLAVIX) 75 mg tablet, Take 1 tablet (75 mg total) by mouth once daily., Disp: 90 tablet, Rfl: 1  •  glyburide-metformin (GLUCOVANCE) 5-500 mg per tablet, TAKE ONE TABLET BY MOUTH DAILY WITH DINNER, Disp: 90 tablet, Rfl: 1  •  lancets (freestyle) 28 gauge misc, test once a day, Disp: , Rfl:   •  pantoprazole (PROTONIX) 40 mg EC tablet, Take 1 tablet (40 mg total) by mouth once daily., Disp: 90 tablet, Rfl: 1  •  pioglitazone (ACTOS) 15 mg tablet, TAKE ONE TABLET BY MOUTH EVERY DAY, Disp: 90 tablet, Rfl: 1  •  pioglitazone (ACTOS) 15 mg tablet, Take 1 tablet (15 mg total) by mouth once daily., Disp: 90 tablet, Rfl: 1  •  simvastatin (ZOCOR) 40 mg tablet, Take 1 tablet (40 mg total) by mouth every evening., Disp: 90 tablet, Rfl: 1  •  zolpidem (AMBIEN) 10 mg tablet, TAKE ONE TABLET BY MOUTH AT BEDTIME AS NEEDED FOR SLEEP, Disp: 30 tablet, Rfl: 0      BP Readings from Last 3 Encounters:   09/26/19 128/80   03/25/19 132/74   02/21/19 128/70       Recent Lab results:  Lab Results   Component Value Date    CHOL 172 03/15/2019   ,   Lab Results   Component Value Date    HDL 59 03/15/2019   ,   Lab Results   Component Value Date    LDLCALC 93 03/15/2019   ,   Lab Results   Component Value Date    TRIG 108 03/15/2019        Lab Results   Component Value Date    GLUCOSE 92 03/15/2019   ,   Lab Results   Component Value Date    HGBA1C 5.7 08/23/2019         Lab Results   Component Value Date    CREATININE 0.86 03/15/2019       Lab Results    Component Value Date    TSH 2.15 03/15/2019            [Appropriately responsive] : appropriately responsive [Alert] : alert [No Acute Distress] : no acute distress [No Lymphadenopathy] : no lymphadenopathy [Regular Rate Rhythm] : regular rate rhythm [No Murmurs] : no murmurs [Clear to Auscultation B/L] : clear to auscultation bilaterally [Soft] : soft [Non-tender] : non-tender [Non-distended] : non-distended [No HSM] : No HSM [No Lesions] : no lesions [No Mass] : no mass [Oriented x3] : oriented x3 [Examination Of The Breasts] : a normal appearance [No Masses] : no breast masses were palpable [Vulvar Atrophy] : vulvar atrophy [Labia Majora] : normal [Labia Minora] : normal [Atrophy] : atrophy [Normal] : normal [Uterine Adnexae] : normal [No Tenderness] : no tenderness [Nl Sphincter Tone] : normal sphincter tone [FreeTextEntry5] : Pap done [FreeTextEntry9] : Hemoccult negative

## 2023-11-05 NOTE — ASSESSMENT & PLAN NOTE
Antiplatelet RX   unclear etiology, possibly related to fluid overload given elevated pro-BNP, report of possibly increased fluid intake of recent  echo 2022 without evidence of HF, no known history of prior heart disease  CXR as above  check RVP  check procal  repeat VBG with AM labs  check d-dimer with AM labs as per tele tech review, documented desat to 87% earlier (subsequently placed on NC), low concern for VTE (only risk factor is travel to [place] 1 month ago) unclear etiology, possibly related to fluid overload given elevated pro-BNP, report of possibly increased fluid intake of recent  echo 2022 without evidence of HF, no known history of prior heart disease  CXR as above  check RVP  check procal  repeat VBG with AM labs  check d-dimer with AM labs as per tele tech review, documented desat to 87% earlier (subsequently placed on NC), low concern for VTE (only risk factor is travel to [place] 1 month ago)  monitor on continuous pulse ox, O2 PRN

## 2025-02-10 NOTE — TELEPHONE ENCOUNTER
ketoconozole is for skin folds groin    A&D ointment for legs with bandages    Triamcinolone was RX'd 12/2021 by historical provider      DO NOT USE     no weight-bearing restrictions no weight-bearing restrictions

## 2025-02-24 NOTE — TELEPHONE ENCOUNTER
Daughter called, states pt got Freestyle blood glucose monitoring device but no lancing device. Pt needs Freestyle lancing device, I couldn't find one in our system to order it may be will have to write her a script for it.   Huddled with provider via Teams, patient OK to keep appt today with PCP.    Called patient and let her know to keep appt later this afternoon. Patient aware. Patient states that she also reached out to her surgery team, also sent them pictures - patient waiting for call back from the team. Patient states she'll plan to come to appt today, but if surgery team is able to see her and/or prescription abx without appt, she will cancel her appt later today. No other questions or concerns.      Deidre Castle, RN, BSN  [facility]  [place], [place] and [place]